# Patient Record
Sex: FEMALE | Race: WHITE | Employment: OTHER | URBAN - METROPOLITAN AREA
[De-identification: names, ages, dates, MRNs, and addresses within clinical notes are randomized per-mention and may not be internally consistent; named-entity substitution may affect disease eponyms.]

---

## 2017-01-24 ENCOUNTER — OFFICE VISIT (OUTPATIENT)
Dept: FAMILY MEDICINE CLINIC | Age: 77
End: 2017-01-24

## 2017-01-24 DIAGNOSIS — E11.9 TYPE 2 DIABETES MELLITUS WITHOUT COMPLICATION, WITHOUT LONG-TERM CURRENT USE OF INSULIN (HCC): ICD-10-CM

## 2017-01-24 DIAGNOSIS — E78.00 PURE HYPERCHOLESTEROLEMIA: ICD-10-CM

## 2017-01-24 DIAGNOSIS — I10 ESSENTIAL HYPERTENSION: ICD-10-CM

## 2017-01-24 DIAGNOSIS — R06.02 SOB (SHORTNESS OF BREATH) ON EXERTION: Primary | ICD-10-CM

## 2017-01-24 LAB — HBA1C MFR BLD HPLC: 6 %

## 2017-01-24 NOTE — PROGRESS NOTES
Jennifer Lantigua is a 78 y.o. female  Chief Complaint   Patient presents with    Diabetes     follow up     1. Have you been to the ER, urgent care clinic since your last visit? Hospitalized since your last visit? No    2. Have you seen or consulted any other health care providers outside of the [address] [place] [address] since your last visit? Include any pap smears or colon screening.  No

## 2017-01-24 NOTE — PATIENT INSTRUCTIONS

## 2017-01-24 NOTE — MR AVS SNAPSHOT
Visit Information Date & Time Provider Department Dept. Phone Encounter #  
 1/24/2017 10:15 AM Pj Torres, 3 [facility] [phone] [id number] Upcoming Health Maintenance Date Due Pneumococcal 65+ Low/Medium Risk (2 of 2 - PCV13) 9/23/2015 COLONOSCOPY 6/14/2016 INFLUENZA AGE 9 TO ADULT 8/1/2016 EYE EXAM RETINAL OR DILATED Q1 8/22/2016 HEMOGLOBIN A1C Q6M 2/22/2017 FOOT EXAM Q1 7/20/2017 MEDICARE YEARLY EXAM 7/21/2017 GLAUCOMA SCREENING Q2Y 8/22/2017 MICROALBUMIN Q1 8/22/2017 LIPID PANEL Q1 8/22/2017 DTaP/Tdap/Td series (2 - Td) 6/15/2025 Allergies as of 1/24/2017  Review Complete On: 1/24/2017 By: Pj Torres MD  
 No Known Allergies Current Immunizations  Reviewed on 1/24/2017 Name Date Influenza High Dose Vaccine PF 9/14/2015 11:59 AM  
 Influenza Vaccine 9/23/2014, 11/13/2013 Pneumococcal Polysaccharide (PPSV-23) 9/23/2014  9:42 AM  
 Tdap 6/15/2015 Zoster Vaccine, Live 9/23/2012 Reviewed by Pj Torres MD on 1/24/2017 at 11:28 AM  
You Were Diagnosed With   
  
 Codes Comments SOB (shortness of breath) on exertion    -  Primary ICD-10-CM: R06.02 
ICD-9-CM: 786.05 Type 2 diabetes mellitus without complication, without long-term current use of insulin (HCC)     ICD-10-CM: E11.9 ICD-9-CM: 250.00 Essential hypertension     ICD-10-CM: I10 
ICD-9-CM: 401.9 Pure hypercholesterolemia     ICD-10-CM: E78.00 ICD-9-CM: 272.0 Vitals BP Pulse Temp Height(growth percentile) Weight(growth percentile) SpO2  
 110/88 (BP 1 Location: Left arm, BP Patient Position: Sitting) 84 97.5 °F (36.4 °C) (Oral) 5' 5.5\" (1.664 m) 240 lb (108.9 kg) 94% BMI OB Status Smoking Status 39.33 kg/m2 Postmenopausal Never Smoker Vitals History BMI and BSA Data Body Mass Index Body Surface Area  
 39.33 kg/m 2 2.24 m 2 [phone] Your Updated Medication List  
  
   
This list is accurate as of: 1/24/17 11:40 AM.  Always use your most recent med list.  
  
  
  
  
 lisinopril-hydroCHLOROthiazide 10-12.5 mg per tablet Commonly known as:  Lucetta Cleve Take 1 Tab by mouth daily. metFORMIN  mg tablet Commonly known as:  GLUCOPHAGE XR Take 1 Tab by mouth daily (with dinner). simvastatin 5 mg tablet Commonly known as:  ZOCOR Take 1 Tab by mouth nightly. tolterodine ER 4 mg ER capsule Commonly known as:  Glenna Tiffany Take 1 Cap by mouth daily. TYLENOL 325 mg tablet Generic drug:  acetaminophen Take  by mouth every four (4) hours as needed for Pain. VITAMIN D2 50,000 unit capsule Generic drug:  ergocalciferol TAKE 1 CAPSULE EVERY 7 DAYS We Performed the Following AMB POC EKG ROUTINE W/ 12 LEADS, INTER & REP [19682 CPT(R)] AMB POC HEMOGLOBIN A1C [54045 CPT(R)] To-Do List   
 01/24/2017 ECHO:  2D ECHO COMPLETE ADULT (TTE) W OR WO CONTR   
  
 01/24/2017 Imaging:  NM CARDIAC SPECT W STRS/REST MULT   
  
 01/24/2017 ECG:  NUCLEAR STRESS TEST   
  
 01/24/2017 Imaging:  XR CHEST PA LAT Patient Instructions Learning About Diabetes Food Guidelines Your Care Instructions Meal planning is important to manage diabetes. It helps keep your blood sugar at a target level (which you set with your doctor). You don't have to eat special foods. You can eat what your family eats, including sweets once in a while. But you do have to pay attention to how often you eat and how much you eat of certain foods. You may want to work with a dietitian or a certified diabetes educator (CDE) to help you plan meals and snacks. A dietitian or CDE can also help you lose weight if that is one of your goals. What should you know about eating carbs? Managing the amount of carbohydrate (carbs) you eat is an important part of healthy meals when you have diabetes. Carbohydrate is found in many foods. · Learn which foods have carbs. And learn the amounts of carbs in different foods. ¨ Bread, cereal, pasta, and rice have about 15 grams of carbs in a serving. A serving is 1 slice of bread (1 ounce), ½ cup of cooked cereal, or 1/3 cup of cooked pasta or rice. ¨ Fruits have 15 grams of carbs in a serving. A serving is 1 small fresh fruit, such as an apple or orange; ½ of a banana; ½ cup of cooked or canned fruit; ½ cup of fruit juice; 1 cup of melon or raspberries; or 2 tablespoons of dried fruit. ¨ Milk and no-sugar-added yogurt have 15 grams of carbs in a serving. A serving is 1 cup of milk or 2/3 cup of no-sugar-added yogurt. ¨ Starchy vegetables have 15 grams of carbs in a serving. A serving is ½ cup of mashed potatoes or sweet potato; 1 cup winter squash; ½ of a small baked potato; ½ cup of cooked beans; or ½ cup cooked corn or green peas. · Learn how much carbs to eat each day and at each meal. A dietitian or CDE can teach you how to keep track of the amount of carbs you eat. This is called carbohydrate counting. · If you are not sure how to count carbohydrate grams, use the Plate Method to plan meals. It is a good, quick way to make sure that you have a balanced meal. It also helps you spread carbs throughout the day. ¨ Divide your plate by types of foods. Put non-starchy vegetables on half the plate, meat or other protein food on one-quarter of the plate, and a grain or starchy vegetable in the final quarter of the plate. To this you can add a small piece of fruit and 1 cup of milk or yogurt, depending on how many carbs you are supposed to eat at a meal. 
· Try to eat about the same amount of carbs at each meal. Do not \"save up\" your daily allowance of carbs to eat at one meal. 
· Proteins have very little or no carbs per serving.  Examples of proteins are beef, chicken, turkey, fish, eggs, tofu, cheese, cottage cheese, and peanut butter. A serving size of meat is 3 ounces, which is about the size of a deck of cards. Examples of meat substitute serving sizes (equal to 1 ounce of meat) are 1/4 cup of cottage cheese, 1 egg, 1 tablespoon of peanut butter, and ½ cup of tofu. How can you eat out and still eat healthy? · Learn to estimate the serving sizes of foods that have carbohydrate. If you measure food at home, it will be easier to estimate the amount in a serving of restaurant food. · If the meal you order has too much carbohydrate (such as potatoes, corn, or baked beans), ask to have a low-carbohydrate food instead. Ask for a salad or green vegetables. · If you use insulin, check your blood sugar before and after eating out to help you plan how much to eat in the future. · If you eat more carbohydrate at a meal than you had planned, take a walk or do other exercise. This will help lower your blood sugar. What else should you know? · Limit saturated fat, such as the fat from meat and dairy products. This is a healthy choice because people who have diabetes are at higher risk of heart disease. So choose lean cuts of meat and nonfat or low-fat dairy products. Use olive or canola oil instead of butter or shortening when cooking. · Don't skip meals. Your blood sugar may drop too low if you skip meals and take insulin or certain medicines for diabetes. · Check with your doctor before you drink alcohol. Alcohol can cause your blood sugar to drop too low. Alcohol can also cause a bad reaction if you take certain diabetes medicines. Follow-up care is a key part of your treatment and safety. Be sure to make and go to all appointments, and call your doctor if you are having problems. It's also a good idea to know your test results and keep a list of the medicines you take. Where can you learn more? Go to http://tamie-alberto.info/. Enter H929 in the search box to learn more about \"Learning About Diabetes Food Guidelines. \" Current as of: May 23, 2016 Content Version: 11.1 © 0555-1307 [employer]. Care instructions adapted under license by [employer] (which disclaims liability or warranty for this information). If you have questions about a medical condition or this instruction, always ask your healthcare professional. [address] any warranty or liability for your use of this information. Introducing [address]! Dear Sakshi Nunez: Thank you for requesting a V[employer] account. Our records indicate that you already have an active Vend account. You can access your account anytime at [email or web address]. [email or web address] Did you know that you can access your hospital and ER discharge instructions at any time in [employer]end? You can also review all of your test results from your hospital stay or ER visit. Additional Information If you have questions, please visit the Frequently Asked Questions section of the [employer]end website at [email or web address] Remember, Vend is NOT to be used for urgent needs. For medical emergencies, dial 911. Now available from your iPhone and Android! Please provide this summary of care documentation to your next provider. Your primary care clinician is listed as Amber Thakur. If you have any questions after today's visit, please call [phone].

## 2017-01-24 NOTE — PROGRESS NOTES
Assessment/Plan:    Jovanny Yusuf was seen today for diabetes. Diagnoses and all orders for this visit:    SOB (shortness of breath) on exertion  -     AMB POC EKG ROUTINE W/ 12 LEADS, INTER & REP  -     NUCLEAR STRESS TEST; Future  -     NM CARDIAC SPECT W STRS / REST MULT; Future  -     2D ECHO COMPLETE ADULT (TTE) W OR WO CONTR; Future  -     XR CHEST PA LAT; Future  -     PULMONARY FUNCTION TEST; Future  -     REFERRAL TO PULMONARY DISEASE    Type 2 diabetes mellitus without complication, without long-term current use of insulin (HCC)  -     AMB POC HEMOGLOBIN A1C    Essential hypertension    Pure hypercholesterolemia      Will await results of above. If cardiac w/u clear, will refer for PFT. Will f/u 3 months. Just A1c. The plan was discussed with the patient. The patient verbalized understanding and is in agreement with the plan. All medication potential side effects were discussed with the patient.    -------------------------------------------------------------------------------------------------------------------        Araceli Deysi is a 78 y.o. female and presents with Diabetes (follow up)         Subjective:  Pt here for f/u. Has been having an on going issue with SOB with exertion x months. This is the first she has mentioned this to us. Was SOB coming down the hallway to the exam room. Has hx of HLD, DM and HTN with are risk factors for CAD. She denies any wheezing, or pillow orthopnea. Finds any distance with walking to be an issue. HTN: well controlled. DM: A1c in office today is 6.0%. HLD: stable. ROS:  Constitutional: No recent weight change. No weakness/fatigue. No f/c. Skin: No rashes, change in nails/hair, itching   HENT: No HA, dizziness. No hearing loss/tinnitus. No nasal congestion/discharge. Eyes: No change in vision, double/blurred vision or eye pain/redness. Cardiovascular: No CP/palpitations. No SZYMANSKI/orthopnea/PND.    Respiratory: No cough/sputum, dyspnea, wheezing. Gastointestinal: No dysphagia, reflux. No n/v. No constipation/diarrhea. No melena/rectal bleeding. Genitourinary: No dysuria, urinary hesitancy, nocturia, hematuria. No incontinence. Musculoskeletal: No joint pain/stiffness. No muscle pain/tenderness. Endo: No heat/cold intolerance, no polyuria/polydypsia. Heme: No h/o anemia. No easy bleeding/bruising. Allergy/Immunology: No seasonal rhinitis. Denies frequent colds, sinus/ear infections. Neurological: No seizures/numbness/weakness. No paresthesias. Psychiatric:  No depression, anxiety. The problem list was updated as a part of today's visit. Patient Active Problem List   Diagnosis Code    HTN (hypertension) I10    Arthritis M19.90    Left femoral vein DVT ([facility] I82.412    Diabetes mellitus, type 2 ([facility] E11.9    Degenerative disc disease, lumbar M51.36    HLD (hyperlipidemia) E78.5       The PSH, FH were reviewed. SH:  Social History   Substance Use Topics    Smoking status: Never Smoker    Smokeless tobacco: Never Used    Alcohol use No       Medications/Allergies:  Current Outpatient Prescriptions on File Prior to Visit   Medication Sig Dispense Refill    metFORMIN ER (GLUCOPHAGE XR) 500 mg tablet Take 1 Tab by mouth daily (with dinner). 90 Tab 2    simvastatin (ZOCOR) 5 mg tablet Take 1 Tab by mouth nightly. 90 Tab 2    acetaminophen (TYLENOL) 325 mg tablet Take  by mouth every four (4) hours as needed for Pain. No current facility-administered medications on file prior to visit.          No Known Allergies      Health Maintenance:   Health Maintenance   Topic Date Due    Pneumococcal 65+ Low/Medium Risk (2 of 2 - PCV13) 09/23/2015    COLONOSCOPY  06/14/2016    INFLUENZA AGE 9 TO ADULT  08/01/2016    FOOT EXAM Q1  07/20/2017    MEDICARE YEARLY EXAM  07/21/2017    HEMOGLOBIN A1C Q6M  07/24/2017    MICROALBUMIN Q1  08/22/2017    LIPID PANEL Q1  08/22/2017    EYE EXAM RETINAL OR DILATED Q1  02/25/2018    GLAUCOMA SCREENING Q2Y  02/25/2019    DTaP/Tdap/Td series (2 - Td) 06/15/2025    OSTEOPOROSIS SCREENING (DEXA)  Completed    ZOSTER VACCINE AGE 60>  Completed       Objective:  Visit Vitals    /88 (BP 1 Location: Left arm, BP Patient Position: Sitting)    Pulse 84    Temp 97.5 °F (36.4 °C) (Oral)    Ht 5' 5.5\" (1.664 m)    Wt 240 lb (108.9 kg)    SpO2 98%    BMI 39.33 kg/m2          Nurses notes and VS reviewed. Physical Examination: General appearance - alert, well appearing, and in no distress  Chest - clear to auscultation, no wheezes, rales or rhonchi, symmetric air entry  Heart - normal rate, regular rhythm, normal S1, S2, no murmurs, rubs, clicks or gallops  Abdomen - soft, nontender, nondistended, no masses or organomegaly        Labwork and Ancillary Studies:    CBC w/Diff  Lab Results   Component Value Date/Time    WBC 6.1 08/22/2016 08:34 AM    HGB 14.7 08/22/2016 08:34 AM    PLATELET 231 35/99/0798 08:34 AM         Basic Metabolic Profile  Lab Results   Component Value Date/Time    Sodium 139 08/22/2016 08:34 AM    Potassium 4.2 08/22/2016 08:34 AM    Chloride 104 08/22/2016 08:34 AM    CO2 26 08/22/2016 08:34 AM    Anion gap 9 08/22/2016 08:34 AM    Glucose 122 08/22/2016 08:34 AM    BUN 23 08/22/2016 08:34 AM    Creatinine 1.22 08/22/2016 08:34 AM    BUN/Creatinine ratio 19 08/22/2016 08:34 AM    GFR est AA 52 08/22/2016 08:34 AM    GFR est non-AA 43 08/22/2016 08:34 AM    Calcium 8.8 08/22/2016 08:34 AM         LFT  Lab Results   Component Value Date/Time    ALT (SGPT) 22 08/22/2016 08:34 AM    AST (SGOT) 18 08/22/2016 08:34 AM    Alk.  phosphatase 70 08/22/2016 08:34 AM    Bilirubin, direct 0.2 08/22/2016 08:34 AM    Bilirubin, total 0.8 08/22/2016 08:34 AM         Cholesterol  Lab Results   Component Value Date/Time    Cholesterol, total 148 08/22/2016 08:34 AM    HDL Cholesterol 50 08/22/2016 08:34 AM    LDL, calculated 69.2 08/22/2016 08:34 AM Triglyceride 144 08/22/2016 08:34 AM    CHOL/HDL Ratio 3.0 08/22/2016 08:34 AM

## 2017-01-26 RX ORDER — LISINOPRIL AND HYDROCHLOROTHIAZIDE 10; 12.5 MG/1; MG/1
TABLET ORAL
Qty: 90 TAB | Refills: 1 | Status: SHIPPED | OUTPATIENT
Start: 2017-01-26 | End: 2017-03-23 | Stop reason: ALTCHOICE

## 2017-01-30 ENCOUNTER — TELEPHONE (OUTPATIENT)
Dept: FAMILY MEDICINE CLINIC | Age: 77
End: 2017-01-30

## 2017-02-22 DIAGNOSIS — R06.02 SOB (SHORTNESS OF BREATH) ON EXERTION: ICD-10-CM

## 2017-03-01 ENCOUNTER — TELEPHONE (OUTPATIENT)
Dept: FAMILY MEDICINE CLINIC | Age: 77
End: 2017-03-01

## 2017-03-01 NOTE — TELEPHONE ENCOUNTER
Staff called asking for a verbal order to add pulmonary simple stress test, 6 minute walk test. Pt exhibited SOB and cyanosis of nose and fingers during PFTs. Okayed verbally by Dr. Stephenie Mancera and order given to pulmonary staff Luisa.

## 2017-03-17 ENCOUNTER — OFFICE VISIT (OUTPATIENT)
Dept: FAMILY MEDICINE CLINIC | Age: 77
End: 2017-03-17

## 2017-03-17 VITALS
TEMPERATURE: 95.2 F | OXYGEN SATURATION: 95 % | SYSTOLIC BLOOD PRESSURE: 145 MMHG | WEIGHT: 241 LBS | HEART RATE: 81 BPM | BODY MASS INDEX: 38.73 KG/M2 | DIASTOLIC BLOOD PRESSURE: 80 MMHG | RESPIRATION RATE: 20 BRPM | HEIGHT: 66 IN

## 2017-03-17 DIAGNOSIS — I83.891 VARICOSE VEINS OF LEG WITH EDEMA, RIGHT: Primary | ICD-10-CM

## 2017-03-17 RX ORDER — MONTELUKAST SODIUM 10 MG/1
10 TABLET ORAL DAILY
Qty: 90 TAB | Refills: 3 | Status: SHIPPED | OUTPATIENT
Start: 2017-03-17 | End: 2017-03-17 | Stop reason: SDUPTHER

## 2017-03-17 RX ORDER — MONTELUKAST SODIUM 10 MG/1
10 TABLET ORAL DAILY
Qty: 30 TAB | Refills: 0 | Status: SHIPPED | OUTPATIENT
Start: 2017-03-17 | End: 2017-03-17 | Stop reason: CLARIF

## 2017-03-17 NOTE — PROGRESS NOTES
Frank Morel is a 78 y.o. female  Here today for right foot swelling and pain in leg. 1. Have you been to the ER, urgent care clinic or hospitalized since your last visit? NO.     2. Have you seen or consulted any other health care providers outside of the [address] [place] [address] since your last visit (Include any pap smears or colon screening)? NO      Do you have an Advanced Directive? NO    Would you like information on Advanced Directives?  NO    Learning Assessment 5/18/2016   PRIMARY LEARNER Patient   HIGHEST LEVEL OF EDUCATION - PRIMARY LEARNER  2 YEARS OF COLLEGE   BARRIERS PRIMARY LEARNER NONE   CO-LEARNER CAREGIVER No   PRIMARY LANGUAGE ENGLISH   LEARNER PREFERENCE PRIMARY LISTENING   ANSWERED BY patient    RELATIONSHIP SELF

## 2017-03-17 NOTE — PATIENT INSTRUCTIONS
Present to the local emergency department immediately after this clinic visit to be evaluated for possible clot in the right leg.

## 2017-03-17 NOTE — PROGRESS NOTES
HISTORY OF PRESENT ILLNESS  Michelle Thomason is a 78 y.o. female. HPI  Patient presents with a warm, edematous right lower extremity from the calf extending into the right foot with mild erythema noted over the anterior tib/fib area of this extremity. She reports a history of these same symptoms to the left lower extremity ultimately diagnosed as a DVT. She has been undergoing evaluation for shortness of breath and is still short of breath today. Denies wheezing, fever, PND, orthopnea. Symptoms graudally developed over the past week and associated with mild discomfort. With elevation of the right lower extremity no change in the light blue color to the foot and toes. Marked varicosities noted bilaterally. Symptom onset gradual, timing constant, pain mild. Review of Systems   Constitutional: Negative. Negative for diaphoresis, fever and malaise/fatigue. HENT: Negative. Eyes: Negative. Negative for blurred vision and photophobia. Respiratory: Negative. Negative for cough, hemoptysis, sputum production, shortness of breath and wheezing. Cardiovascular: Positive for leg swelling. Negative for chest pain, palpitations, orthopnea, claudication and PND. Gastrointestinal: Negative. Negative for abdominal pain, nausea and vomiting. Musculoskeletal: Negative. Negative for back pain, joint pain, myalgias and neck pain. Skin: Negative. Negative for itching and rash. Neurological: Negative. Negative for dizziness, sensory change, focal weakness and weakness.         Past Medical History:   Diagnosis Date    Arthritis     Degenerative disc disease, lumbar 7/3/2014    Diabetes mellitus, type 2 ([address] 6/26/2014    HLD (hyperlipidemia) 3/16/2015    Hypertension     IFG (impaired fasting glucose) 6/26/2014    Left femoral vein DVT ([facility] 2/11/2014       Current Outpatient Prescriptions:     VITAMIN D2 50,000 unit capsule, TAKE 1 CAPSULE EVERY 7 DAYS, Disp: 12 Cap, Rfl: 2   lisinopril-hydroCHLOROthiazide (PRINZIDE, ZESTORETIC) 10-12.5 mg per tablet, TAKE 1 TABLET DAILY, Disp: 90 Tab, Rfl: 1    metFORMIN ER (GLUCOPHAGE XR) 500 mg tablet, Take 1 Tab by mouth daily (with dinner). , Disp: 90 Tab, Rfl: 2    simvastatin (ZOCOR) 5 mg tablet, Take 1 Tab by mouth nightly., Disp: 90 Tab, Rfl: 2    tolterodine ER (DETROL LA) 4 mg ER capsule, Take 1 Cap by mouth daily. , Disp: 90 Cap, Rfl: 2    acetaminophen (TYLENOL) 325 mg tablet, Take  by mouth every four (4) hours as needed for Pain., Disp: , Rfl:       Physical Exam   Constitutional: She is oriented to person, place, and time. She appears well-developed and well-nourished. No distress. HENT:   Head: Normocephalic and atraumatic. Eyes: EOM are normal. Pupils are equal, round, and reactive to light. No scleral icterus. Neck: Neck supple. No JVD present. No tracheal deviation present. Cardiovascular: Normal rate, regular rhythm, normal heart sounds and intact distal pulses. Exam reveals no gallop and no friction rub. No murmur heard. Pulmonary/Chest: Effort normal and breath sounds normal. No stridor. No respiratory distress. She has no wheezes. She has no rales. She exhibits no tenderness. Breath sounds are clear but slightly decreased. Abdominal: Soft. Bowel sounds are normal. She exhibits no distension. There is no tenderness. There is no rebound and no guarding. Musculoskeletal: She exhibits edema. She exhibits no tenderness or deformity. The soft tissues of the right lower extremity are tight in appearance and in palpation. No cords noted to right lower extremity. Right foot and lower tib/fib is warmer compared to the left. Right foot nails slightly bluish in color with no change in elevation of this foot. Gait: slightly antalgic favoring the RLE. Neurological: She is alert and oriented to person, place, and time. No cranial nerve deficit. She exhibits normal muscle tone.  Coordination normal.   Skin: She is not diaphoretic. Psychiatric: She has a normal mood and affect. Her behavior is normal. Thought content normal.   Nursing note and vitals reviewed. ASSESSMENT and PLAN  1. Acute right lower extremity edema, warm with mild streaking  2. Shortness of breath   -Referral to hospital with recommendation of daughter presenting patient to ED via private vehicle as she is stable. Patient is in agreement with the treatment plan. Return to the clinic if needed. All questions answered and discharge instructions reviewed with the patient.

## 2017-03-17 NOTE — MR AVS SNAPSHOT
Visit Information Date & Time Provider Department Dept. Phone Encounter #  
 3/17/2017  2:15 PM Hanane Pimentel, 3 [facility] [phone] [id number] Your Appointments 4/26/2017 10:15 AM  
Follow Up with Lexus Hopkins MD  
3 [facility]) Appt Note: 1 month f/u; r/s  
 [address] [facility] [address] [facility] [address] [phone]  
  
   
 [address] Upcoming Health Maintenance Date Due Pneumococcal 65+ Low/Medium Risk (2 of 2 - PCV13) 9/23/2015 COLONOSCOPY 6/14/2016 INFLUENZA AGE 9 TO ADULT 8/1/2016 FOOT EXAM Q1 7/20/2017 MEDICARE YEARLY EXAM 7/21/2017 HEMOGLOBIN A1C Q6M 7/24/2017 MICROALBUMIN Q1 8/22/2017 LIPID PANEL Q1 8/22/2017 EYE EXAM RETINAL OR DILATED Q1 2/25/2018 GLAUCOMA SCREENING Q2Y 2/25/2019 DTaP/Tdap/Td series (2 - Td) 6/15/2025 Allergies as of 3/17/2017  Review Complete On: 3/17/2017 By: Hanane Pimentel NP No Known Allergies Current Immunizations  Reviewed on 1/24/2017 Name Date Influenza High Dose Vaccine PF 9/14/2015 11:59 AM  
 Influenza Vaccine 9/23/2014, 11/13/2013 Pneumococcal Polysaccharide (PPSV-23) 9/23/2014  9:42 AM  
 Tdap 6/15/2015 Zoster Vaccine, Live 9/23/2012 Not reviewed this visit You Were Diagnosed With   
  
 Codes Comments Varicose veins of leg with edema, right    -  Primary ICD-10-CM: Y78.369 ICD-9-CM: 454.8 Vitals BP Pulse Temp Resp Height(growth percentile) Weight(growth percentile) 145/80 (BP 1 Location: Left arm, BP Patient Position: Sitting) 81 95.2 °F (35.1 °C) (Oral) 20 5' 5.5\" (1.664 m) 241 lb (109.3 kg) SpO2 BMI OB Status Smoking Status 95% 39.49 kg/m2 Postmenopausal Never Smoker BMI and BSA Data Body Mass Index Body Surface Area  
 39.49 kg/m 2 2.25 m 2 [phone] Your Updated Medication List  
  
   
This list is accurate as of: 3/17/17  2:30 PM.  Always use your most recent med list.  
  
  
  
  
 lisinopril-hydroCHLOROthiazide 10-12.5 mg per tablet Commonly known as:  PRINZIDE, ZESTORETIC  
TAKE 1 TABLET DAILY  
  
 metFORMIN  mg tablet Commonly known as:  GLUCOPHAGE XR Take 1 Tab by mouth daily (with dinner). simvastatin 5 mg tablet Commonly known as:  ZOCOR Take 1 Tab by mouth nightly. tolterodine ER 4 mg ER capsule Commonly known as:  Mikala Poet Take 1 Cap by mouth daily. TYLENOL 325 mg tablet Generic drug:  acetaminophen Take  by mouth every four (4) hours as needed for Pain. VITAMIN D2 50,000 unit capsule Generic drug:  ergocalciferol TAKE 1 CAPSULE EVERY 7 DAYS Patient Instructions Present to the local emergency department immediately after this clinic visit to be evaluated for possible clot in the right leg. Introducing [facility]! Dear Tera Troncoso: Thank you for requesting a Pronota account. Our records indicate that you already have an active Pronota account. You can access your account anytime at [email or web address] Did you know that you can access your hospital and ER discharge instructions at any time in Pronota? You can also review all of your test results from your hospital stay or ER visit. Additional Information If you have questions, please visit the Frequently Asked Questions section of the Pronota website at [email or web address] Remember, Pronota is NOT to be used for urgent needs. For medical emergencies, dial 911. Now available from your iPhone and Android! Please provide this summary of care documentation to your next provider. Your primary care clinician is listed as Amber 51.  If you have any questions after today's visit, please call [phone].

## 2017-03-19 RX ORDER — TOLTERODINE TARTRATE 4 MG/1
CAPSULE, EXTENDED RELEASE ORAL
Qty: 90 CAP | Refills: 1 | Status: SHIPPED | OUTPATIENT
Start: 2017-03-19 | End: 2018-08-13 | Stop reason: SDUPTHER

## 2017-03-22 ENCOUNTER — PATIENT OUTREACH (OUTPATIENT)
Dept: FAMILY MEDICINE CLINIC | Age: 77
End: 2017-03-22

## 2017-03-22 ENCOUNTER — TELEPHONE (OUTPATIENT)
Dept: FAMILY MEDICINE CLINIC | Age: 77
End: 2017-03-22

## 2017-03-22 VITALS
HEART RATE: 84 BPM | BODY MASS INDEX: 38.57 KG/M2 | WEIGHT: 240 LBS | OXYGEN SATURATION: 98 % | DIASTOLIC BLOOD PRESSURE: 88 MMHG | SYSTOLIC BLOOD PRESSURE: 110 MMHG | HEIGHT: 66 IN | TEMPERATURE: 97.5 F

## 2017-03-22 NOTE — TELEPHONE ENCOUNTER
Called pt to see how she was doing. Discussed with her what had happened during her hospital stay. She feels much better now and is on Eliquis. She will now need to stay on this life long since she has prior hx of DVT. She verbalized understanding, has a f/u next week with me. She thanked me for calling her.

## 2017-03-22 NOTE — Clinical Note
JIMMY is sched. for 3/27/2017 [id number] ( 7 day) DVT and PE discharged 3/22/2017 from [facility]. Sending you a medication request for her Eliquis now.  Thanks

## 2017-03-23 ENCOUNTER — PATIENT OUTREACH (OUTPATIENT)
Dept: FAMILY MEDICINE CLINIC | Age: 77
End: 2017-03-23

## 2017-03-23 DIAGNOSIS — I26.92 ACUTE SADDLE PULMONARY EMBOLISM WITHOUT ACUTE COR PULMONALE (HCC): ICD-10-CM

## 2017-03-23 DIAGNOSIS — I82.413 ACUTE DEEP VEIN THROMBOSIS (DVT) OF FEMORAL VEIN OF BOTH LOWER EXTREMITIES (HCC): Primary | ICD-10-CM

## 2017-03-23 PROBLEM — I82.4Z1 ACUTE EMBOLISM AND THROMBOSIS OF DEEP VEIN OF RIGHT DISTAL LOWER EXTREMITY (HCC): Status: ACTIVE | Noted: 2017-03-18

## 2017-03-23 PROBLEM — R06.02 SHORTNESS OF BREATH: Status: ACTIVE | Noted: 2017-03-23

## 2017-03-23 PROBLEM — I26.99 PULMONARY EMBOLISM (HCC): Status: ACTIVE | Noted: 2017-03-18

## 2017-03-23 PROBLEM — R09.02 HYPOXIA: Status: ACTIVE | Noted: 2017-03-17

## 2017-03-23 RX ORDER — OXYCODONE HYDROCHLORIDE 5 MG/1
5-10 TABLET ORAL
COMMUNITY
Start: 2016-03-02 | End: 2017-04-26

## 2017-03-23 RX ORDER — DOCUSATE SODIUM 100 MG/1
100 CAPSULE, LIQUID FILLED ORAL DAILY
COMMUNITY
Start: 2016-03-02 | End: 2017-04-26

## 2017-03-23 RX ORDER — LISINOPRIL 5 MG/1
5 TABLET ORAL DAILY
COMMUNITY
Start: 2017-03-22 | End: 2017-03-27 | Stop reason: SDUPTHER

## 2017-03-23 NOTE — PROGRESS NOTES
.  Transitional Care Nurse Navigator Note:  Hospital Follow Up for [facility] Admission from 3/17 - 22/2017 for SOB, DVT and PE. RRAT score: 26 High  Medical History:     Past Medical History:   Diagnosis Date    Arthritis     Degenerative disc disease, lumbar 7/3/2014    Diabetes mellitus, type 2 ([facility] [address] 6/26/2014    HLD (hyperlipidemia) 3/16/2015    Hypertension     IFG (impaired fasting glucose) 6/26/2014    Left femoral vein DVT ([facility] [address] 2/11/2014       Patient presenting symptoms SOB    Diagnosed with DVT and PE. Admitted to Hospitalist Service with consults from Pulmonary and vascular. Consults recommended Eliquis on going x 120 days and then re-evaluate. Active Problems:  Hypoxia  Pulmonary embolism (HCC)  Acute deep vein thrombosis (DVT) of distal vein of right lower extremity (HCC)  Acute saddle pulmonary embolism without acute cor pulmonale (HCC)     Course of current Hospitalization (referenced by Jonathan Weeks MD note):   Hospital Course: Ms. Irma Dennison has been feeling short winded for months.  However, the past few days has seen an significant increase in her dyspnea and effort tolerance.  \"What used to take me a day to complete now takes a week. \"  She says she has seen her PCM for this, and has been through radiography and cardiac studies that she reports were unrevealing.  She says she has a PmHx of 1 left leg DVT years ago, the cause of which was not known.  She has a Fam Hx positive for DVT/PE (her mother).  She says she has not had any periods of significant immobility, illness and has no known screenings positive for malignancy. Patient was admitted to the medical floor and was started on subcutaneous heparin. Patient's echo did not reveal any right ventricular strain and peter H peptide is appears to be unremarkable. She was subsequent started on novel oral anticoagulants and patient was subsequently discharged home.  She had walking pulse ox done before her discharge and she did not qualify for oxygen at home. He was resumed back again on her usual medications. Medication Reconciliation completed: YES    Prescription Medication:  Eliquis 10 mg twice a day for 5 more days  Eliquis 5 mg twice a day for 120 days after completion of the 10 mg dosing. Support System consists of: Daughter Marcellus Gross     This represents Transitions of Care because NN spoke with patient and/or caregiver within the same  business day of discharge. Pt's TCM follow up appt is scheduled with Dr. Christen Marcelo on Monday 03/27/2017 @ 2:00pm  which is within 5 days of discharge. Patient Called in to office on 3/22/2017 for a hospital follow up and to get Dr Christen Marcelo to send her Eliquis 5 mg twice a day to [facility] before she comes in because she will be out of the medication soon. Care Mgmt intake done . Will call patient tomorrow to let her know that Dr Christen Marcelo has e-scribed her medication in.

## 2017-03-23 NOTE — PROGRESS NOTES
Called patient to inform her of new Eliquis prescription for 5 mg twice a day was sent to Dr Bee Chatman for refill to [facility]. Patient stated \" She , Dr Bee Chatman called her on yesterday to see how she was\" Will see us on Monday 3/27/2017 at her f/u [facility] appointment. Denies SOB at this time.

## 2017-03-27 ENCOUNTER — OFFICE VISIT (OUTPATIENT)
Dept: FAMILY MEDICINE CLINIC | Age: 77
End: 2017-03-27

## 2017-03-27 VITALS
SYSTOLIC BLOOD PRESSURE: 142 MMHG | DIASTOLIC BLOOD PRESSURE: 84 MMHG | WEIGHT: 242 LBS | OXYGEN SATURATION: 95 % | HEART RATE: 82 BPM | RESPIRATION RATE: 22 BRPM | BODY MASS INDEX: 38.89 KG/M2 | HEIGHT: 66 IN | TEMPERATURE: 98.3 F

## 2017-03-27 DIAGNOSIS — I26.92 ACUTE SADDLE PULMONARY EMBOLISM WITHOUT ACUTE COR PULMONALE (HCC): Primary | ICD-10-CM

## 2017-03-27 DIAGNOSIS — I10 ESSENTIAL HYPERTENSION: ICD-10-CM

## 2017-03-27 DIAGNOSIS — I82.4Z1 ACUTE EMBOLISM AND THROMBOSIS OF DEEP VEIN OF RIGHT DISTAL LOWER EXTREMITY (HCC): ICD-10-CM

## 2017-03-27 RX ORDER — LISINOPRIL 5 MG/1
5 TABLET ORAL DAILY
Qty: 90 TAB | Refills: 2 | Status: SHIPPED | OUTPATIENT
Start: 2017-03-27 | End: 2017-08-23 | Stop reason: SDUPTHER

## 2017-03-27 NOTE — PATIENT INSTRUCTIONS
Deep Vein Thrombosis: Care Instructions  Your Care Instructions    A deep vein thrombosis (DVT) is a blood clot in certain veins of the legs, pelvis, or arms. Blood clots in these veins need to be treated because they can get bigger, break loose, and travel through the bloodstream to the lungs. A blood clot in a lung can be life-threatening. The doctor may have given you a blood thinner (anticoagulant). A blood thinner can stop the blood clot from growing larger and prevent new clots from forming. You will need to take a blood thinner for 3 to 6 months or longer. The doctor has checked you carefully, but problems can develop later. If you notice any problems or new symptoms, get medical treatment right away. Follow-up care is a key part of your treatment and safety. Be sure to make and go to all appointments, and call your doctor if you are having problems. It's also a good idea to know your test results and keep a list of the medicines you take. How can you care for yourself at home? · Take your medicines exactly as prescribed. Call your doctor if you think you are having a problem with your medicine. · If you are taking a blood thinner, be sure you get instructions about how to take your medicine safely. Blood thinners can cause serious bleeding problems. · Wear compression stockings if your doctor recommends them. These stockings are tighter at the feet than on the legs. They may reduce pain and swelling in your legs. You can get them with a prescription at a medical supply store or at some drugstores. · When you sit, use a pillow to raise the arm or leg that has the blood clot. Try to keep it above the level of your heart. When should you call for help? Call 911 anytime you think you may need emergency care. For example, call if:  · You passed out (lost consciousness). · You have symptoms of a blood clot in your lung (called a pulmonary embolism). These include:  ¨ Sudden chest pain.   ¨ Trouble breathing. ¨ Coughing up blood. Call your doctor now or seek immediate medical care if:  · You have new or worse trouble breathing. · You are dizzy or lightheaded, or you feel like you may faint. · You have symptoms of a blood clot in your arm or leg. These may include:  ¨ Pain in the arm, calf, back of the knee, thigh, or groin. ¨ Redness and swelling in the arm, leg, or groin. Watch closely for changes in your health, and be sure to contact your doctor if:  · You do not get better as expected. Where can you learn more? Go to http://tamie-alberto.info/. Enter S974 in the search box to learn more about \"Deep Vein Thrombosis: Care Instructions. \"  Current as of: June 4, 2016  Content Version: 11.1  © 5318-3031 [employer]. Care instructions adapted under license by [employer] (which disclaims liability or warranty for this information). If you have questions about a medical condition or this instruction, always ask your healthcare professional. [place] [address] any warranty or liability for your use of this information.

## 2017-03-27 NOTE — PROGRESS NOTES
Nadira Valenzuela is a 78 y.o. female here today for transition of care visit. 1. Have you been to the ER, urgent care clinic since your last visit? Hospitalized since your last visit? Yes Reason for visit: 3/22/17 SPAH, shortness of breath    2. Have you seen or consulted any other health care providers outside of the [address] [place] [address] since your last visit? Include any pap smears or colon screening.  No

## 2017-03-27 NOTE — PROGRESS NOTES
Assessment/Plan:    *Diagnoses and all orders for this visit:    Acute saddle pulmonary embolism without acute cor pulmonale (HCC)    Acute embolism and thrombosis of deep vein of right distal lower extremity (HCC)    Essential hypertension    Other orders  -     lisinopril (PRINIVIL, ZESTRIL) 5 mg tablet; Take 1 Tab by mouth daily. Routine f/u in 1 month. The plan was discussed with the patient. The patient verbalized understanding and is in agreement with the plan. All medication potential side effects were discussed with the patient.    -------------------------------------------------------------------------------------------------------------------        Halley Carlson is a 78 y.o. female and presents with No chief complaint on file. Subjective:  Pt here for a JIMMY visit. Pt outreach on 3/23/17 by DEMARIO Sexton. Pt was admitted from 3/17/17 to 3/22/17 for extensive RT LE DVT with PE as well. Was quite severe, on Eliquis 5 mg BID at present. 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 She will now stay on anticoagulation lifetime. ROS:  Constitutional: No recent weight change. No weakness/fatigue. No f/c. Skin: No rashes, change in nails/hair, itching   HENT: No HA, dizziness. No hearing loss/tinnitus. No nasal congestion/discharge. Eyes: No change in vision, double/blurred vision or eye pain/redness. Cardiovascular: No CP/palpitations. No SZYMANSKI/orthopnea/PND. Respiratory: No cough/sputum, dyspnea, wheezing. Gastointestinal: No dysphagia, reflux. No n/v. No constipation/diarrhea. No melena/rectal bleeding. Genitourinary: No dysuria, urinary hesitancy, nocturia, hematuria. No incontinence. Musculoskeletal: No joint pain/stiffness. No muscle pain/tenderness. Endo: No heat/cold intolerance, no polyuria/polydypsia. Heme: No h/o anemia. No easy bleeding/bruising. Allergy/Immunology: No seasonal rhinitis. Denies frequent colds, sinus/ear infections. Neurological: No seizures/numbness/weakness. No paresthesias. Psychiatric:  No depression, anxiety. The problem list was updated as a part of today's visit. Patient Active Problem List   Diagnosis Code    HTN (hypertension) I10    Arthritis M19.90    Left femoral vein DVT ([facility] [address] I82.412    Diabetes mellitus, type 2 ([facility] [address] E11.9    Degenerative disc disease, lumbar M51.36    HLD (hyperlipidemia) E78.5    Acute embolism and thrombosis of deep vein of right distal lower extremity ([address] I82.4Z1    Saddle embolus of pulmonary artery without acute cor pulmonale (HCC) I26.92    Aftercare following joint replacement surgery Z47.1    Knee pain M25.569    Hypoxia R09.02    Primary localized osteoarthrosis of pelvic region M16.10    Osteoarthritis of knee M17.9    Pulmonary embolism (HCC) I26.99    Shortness of breath R06.02    History of total knee replacement Z96.659       The PSH, FH were reviewed. SH:  Social History   Substance Use Topics    Smoking status: Never Smoker    Smokeless tobacco: Never Used    Alcohol use No       Medications/Allergies:  Current Outpatient Prescriptions on File Prior to Visit   Medication Sig Dispense Refill    apixaban (ELIQUIS) 5 mg tablet Take 10 mg by mouth two (2) times a day. For 5 days      docusate sodium (COLACE) 100 mg capsule Take 100 mg by mouth daily.  apixaban (ELIQUIS) 5 mg tablet Take 1 Tab by mouth two (2) times a day. for DVT, Tab 2    DETROL LA 4 mg ER capsule TAKE 1 CAPSULE DAILY 90 Cap 1    VITAMIN D2 50,000 unit capsule TAKE 1 CAPSULE EVERY 7 DAYS 12 Cap 2    metFORMIN ER (GLUCOPHAGE XR) 500 mg tablet Take 1 Tab by mouth daily (with dinner). 90 Tab 2    simvastatin (ZOCOR) 5 mg tablet Take 1 Tab by mouth nightly. 90 Tab 2    acetaminophen (TYLENOL) 325 mg tablet Take  by mouth every four (4) hours as needed for Pain.       oxyCODONE IR (ROXICODONE) 5 mg immediate release tablet Take 5-10 mg by mouth every four (4) hours as needed for Pain. No current facility-administered medications on file prior to visit. No Known Allergies      Health Maintenance:   Health Maintenance   Topic Date Due    Pneumococcal 65+ Low/Medium Risk (2 of 2 - PCV13) 01/01/2016    COLONOSCOPY  06/14/2016    FOOT EXAM Q1  07/20/2017    MEDICARE YEARLY EXAM  07/21/2017    HEMOGLOBIN A1C Q6M  07/24/2017    MICROALBUMIN Q1  08/22/2017    LIPID PANEL Q1  08/22/2017    EYE EXAM RETINAL OR DILATED Q1  02/25/2018    GLAUCOMA SCREENING Q2Y  02/25/2019    DTaP/Tdap/Td series (2 - Td) 06/15/2025    OSTEOPOROSIS SCREENING (DEXA)  Completed    ZOSTER VACCINE AGE 60>  Completed    INFLUENZA AGE 9 TO ADULT  Completed       Objective:  Visit Vitals    /84    Pulse 82    Temp 98.3 °F (36.8 °C)    Resp 22    Ht 5' 5.5\" (1.664 m)    Wt 242 lb (109.8 kg)    SpO2 95%    BMI 39.66 kg/m2          Nurses notes and VS reviewed.       Physical Examination: General appearance - alert, well appearing, and in no distress  Chest - clear to auscultation, no wheezes, rales or rhonchi, symmetric air entry  Heart - normal rate, regular rhythm, normal S1, S2, no murmurs, rubs, clicks or gallops  Musculoskeletal - no joint tenderness, deformity or swelling  Extremities - peripheral pulses normal, no pedal edema, no clubbing or cyanosis      Labwork and Ancillary Studies:    CBC w/Diff  Lab Results   Component Value Date/Time    WBC 6.1 08/22/2016 08:34 AM    HGB 14.7 08/22/2016 08:34 AM    PLATELET 200 76/39/3338 08:34 AM         Basic Metabolic Profile  Lab Results   Component Value Date/Time    Sodium 139 08/22/2016 08:34 AM    Potassium 4.2 08/22/2016 08:34 AM    Chloride 104 08/22/2016 08:34 AM    CO2 26 08/22/2016 08:34 AM    Anion gap 9 08/22/2016 08:34 AM    Glucose 122 08/22/2016 08:34 AM    BUN 23 08/22/2016 08:34 AM    Creatinine 1.22 08/22/2016 08:34 AM    BUN/Creatinine ratio 19 08/22/2016 08:34 AM    GFR est AA 52 08/22/2016 08:34 AM    GFR est non-AA 43 08/22/2016 08:34 AM    Calcium 8.8 08/22/2016 08:34 AM         LFT  Lab Results   Component Value Date/Time    ALT (SGPT) 22 08/22/2016 08:34 AM    AST (SGOT) 18 08/22/2016 08:34 AM    Alk.  phosphatase 70 08/22/2016 08:34 AM    Bilirubin, direct 0.2 08/22/2016 08:34 AM    Bilirubin, total 0.8 08/22/2016 08:34 AM         Cholesterol  Lab Results   Component Value Date/Time    Cholesterol, total 148 08/22/2016 08:34 AM    HDL Cholesterol 50 08/22/2016 08:34 AM    LDL, calculated 69.2 08/22/2016 08:34 AM    Triglyceride 144 08/22/2016 08:34 AM    CHOL/HDL Ratio 3.0 08/22/2016 08:34 AM

## 2017-03-27 NOTE — MR AVS SNAPSHOT
Visit Information Date & Time Provider Department Dept. Phone Encounter #  
 3/27/2017  2:00 PM Jackelyn Robbins, [facility] [phone] [id number] Your Appointments 4/26/2017 10:15 AM  
Follow Up with Jackelyn Robbins MD  
3 [facility]) Appt Note: 1 month f/u; r/s  
 8[address] [facility] [address] [place] [id number] [phone]  
  
   
 [address] Donna Ingram [address] Upcoming Health Maintenance Date Due Pneumococcal 65+ Low/Medium Risk (2 of 2 - PCV13) 1/1/2016 COLONOSCOPY 6/14/2016 FOOT EXAM Q1 7/20/2017 MEDICARE YEARLY EXAM 7/21/2017 HEMOGLOBIN A1C Q6M 7/24/2017 MICROALBUMIN Q1 8/22/2017 LIPID PANEL Q1 8/22/2017 EYE EXAM RETINAL OR DILATED Q1 2/25/2018 GLAUCOMA SCREENING Q2Y 2/25/2019 DTaP/Tdap/Td series (2 - Td) 6/15/2025 Allergies as of 3/27/2017  Review Complete On: 3/27/2017 By: Juana Lam LPN No Known Allergies Current Immunizations  Reviewed on 1/24/2017 Name Date Influenza High Dose Vaccine PF 9/14/2015 11:59 AM  
 Influenza Vaccine 11/1/2016 12:00 AM, 9/23/2014, 11/13/2013 Pneumococcal Polysaccharide (PPSV-23) 1/1/2015 12:00 AM, 9/23/2014  9:42 AM  
 Tdap 6/15/2015 Zoster Vaccine, Live 9/23/2012 Not reviewed this visit Vitals BP Pulse Temp Resp Height(growth percentile) Weight(growth percentile) 142/84 82 98.3 °F (36.8 °C) 22 5' 5.5\" (1.664 m) 242 lb (109.8 kg) SpO2 BMI OB Status Smoking Status 95% 39.66 kg/m2 Postmenopausal Never Smoker Vitals History BMI and BSA Data Body Mass Index Body Surface Area  
 39.66 kg/m 2 2.25 m 2 Preferred Pharmacy Pharmacy Name Phone [address] [facility] [phone] Your Updated Medication List  
  
   
This list is accurate as of: 3/27/17  2:38 PM.  Always use your most recent med list.  
  
  
  
  
 * apixaban 5 mg tablet Commonly known as:  Bryant Croissant Take 10 mg by mouth two (2) times a day. For 5 days * apixaban 5 mg tablet Commonly known as:  Bryant Croissant Take 1 Tab by mouth two (2) times a day. for DVT,PE  
  
 DETROL LA 4 mg ER capsule Generic drug:  tolterodine ER  
TAKE 1 CAPSULE DAILY docusate sodium 100 mg capsule Commonly known as:  Doc Ilda Take 100 mg by mouth daily. lisinopril 5 mg tablet Commonly known as:  Anne Mary Take 5 mg by mouth daily. metFORMIN  mg tablet Commonly known as:  GLUCOPHAGE XR Take 1 Tab by mouth daily (with dinner). oxyCODONE IR 5 mg immediate release tablet Commonly known as:  Benjaman Hurl Take 5-10 mg by mouth every four (4) hours as needed for Pain.  
  
 simvastatin 5 mg tablet Commonly known as:  ZOCOR Take 1 Tab by mouth nightly. TYLENOL 325 mg tablet Generic drug:  acetaminophen Take  by mouth every four (4) hours as needed for Pain. VITAMIN D2 50,000 unit capsule Generic drug:  ergocalciferol TAKE 1 CAPSULE EVERY 7 DAYS * Notice: This list has 2 medication(s) that are the same as other medications prescribed for you. Read the directions carefully, and ask your doctor or other care provider to review them with you. Patient Instructions Deep Vein Thrombosis: Care Instructions Your Care Instructions A deep vein thrombosis (DVT) is a blood clot in certain veins of the legs, pelvis, or arms. Blood clots in these veins need to be treated because they can get bigger, break loose, and travel through the bloodstream to the lungs. A blood clot in a lung can be life-threatening. The doctor may have given you a blood thinner (anticoagulant). A blood thinner can stop the blood clot from growing larger and prevent new clots from forming. You will need to take a blood thinner for 3 to 6 months or longer. The doctor has checked you carefully, but problems can develop later. If you notice any problems or new symptoms, get medical treatment right away. Follow-up care is a key part of your treatment and safety. Be sure to make and go to all appointments, and call your doctor if you are having problems. It's also a good idea to know your test results and keep a list of the medicines you take. How can you care for yourself at home? · Take your medicines exactly as prescribed. Call your doctor if you think you are having a problem with your medicine. · If you are taking a blood thinner, be sure you get instructions about how to take your medicine safely. Blood thinners can cause serious bleeding problems. · Wear compression stockings if your doctor recommends them. These stockings are tighter at the feet than on the legs. They may reduce pain and swelling in your legs. You can get them with a prescription at a medical supply store or at some drugstores. · When you sit, use a pillow to raise the arm or leg that has the blood clot. Try to keep it above the level of your heart. When should you call for help? Call 911 anytime you think you may need emergency care. For example, call if: 
· You passed out (lost consciousness). · You have symptoms of a blood clot in your lung (called a pulmonary embolism). These include: 
¨ Sudden chest pain. ¨ Trouble breathing. ¨ Coughing up blood. Call your doctor now or seek immediate medical care if: 
· You have new or worse trouble breathing. · You are dizzy or lightheaded, or you feel like you may faint. · You have symptoms of a blood clot in your arm or leg. These may include: 
¨ Pain in the arm, calf, back of the knee, thigh, or groin. ¨ Redness and swelling in the arm, leg, or groin. Watch closely for changes in your health, and be sure to contact your doctor if: 
· You do not get better as expected. Where can you learn more? Go to http://tamie-alberto.info/. Enter I847 in the search box to learn more about \"Deep Vein Thrombosis: Care Instructions. \" Current as of: June 4, 2016 Content Version: 11.1 © 7447-6895 Health Information Designs. Care instructions adapted under license by MobilyTrip (which disclaims liability or warranty for this information). If you have questions about a medical condition or this instruction, always ask your healthcare professional. [address] any warranty or liability for your use of this information. Introducing [facility] & HEALTH SERVICES! Dear Sakshi Nunez: Thank you for requesting a Your Policy Manager account. Our records indicate that you already have an active Your Policy Manager account. You can access your account anytime at [email or web address] Did you know that you can access your hospital and ER discharge instructions at any time in Your Policy Manager? You can also review all of your test results from your hospital stay or ER visit. Additional Information If you have questions, please visit the Frequently Asked Questions section of the Your Policy Manager website at [email or web address] Remember, Your Policy Manager is NOT to be used for urgent needs. For medical emergencies, dial 911. Now available from your iPhone and Android! Please provide this summary of care documentation to your next provider. Your primary care clinician is listed as Amber 51. If you have any questions after today's visit, please call [phone].

## 2017-03-30 DIAGNOSIS — R06.02 SOB (SHORTNESS OF BREATH) ON EXERTION: ICD-10-CM

## 2017-04-02 RX ORDER — METFORMIN HYDROCHLORIDE 500 MG/1
TABLET, EXTENDED RELEASE ORAL
Qty: 90 TAB | Refills: 1 | Status: SHIPPED | OUTPATIENT
Start: 2017-04-02 | End: 2018-09-04 | Stop reason: SDUPTHER

## 2017-04-02 RX ORDER — SIMVASTATIN 5 MG/1
TABLET, FILM COATED ORAL
Qty: 90 TAB | Refills: 1 | Status: SHIPPED | OUTPATIENT
Start: 2017-04-02 | End: 2017-09-29 | Stop reason: SDUPTHER

## 2017-04-07 ENCOUNTER — PATIENT OUTREACH (OUTPATIENT)
Dept: FAMILY MEDICINE CLINIC | Age: 77
End: 2017-04-07

## 2017-04-10 ENCOUNTER — PATIENT OUTREACH (OUTPATIENT)
Dept: FAMILY MEDICINE CLINIC | Age: 77
End: 2017-04-10

## 2017-04-11 ENCOUNTER — TELEPHONE (OUTPATIENT)
Dept: FAMILY MEDICINE CLINIC | Age: 77
End: 2017-04-11

## 2017-04-11 ENCOUNTER — OFFICE VISIT (OUTPATIENT)
Dept: FAMILY MEDICINE CLINIC | Age: 77
End: 2017-04-11

## 2017-04-11 VITALS
OXYGEN SATURATION: 94 % | HEIGHT: 66 IN | TEMPERATURE: 97.8 F | HEART RATE: 83 BPM | RESPIRATION RATE: 18 BRPM | BODY MASS INDEX: 38.57 KG/M2 | SYSTOLIC BLOOD PRESSURE: 138 MMHG | WEIGHT: 240 LBS | DIASTOLIC BLOOD PRESSURE: 84 MMHG

## 2017-04-11 DIAGNOSIS — R06.00 DYSPNEA, UNSPECIFIED TYPE: Primary | ICD-10-CM

## 2017-04-11 DIAGNOSIS — I26.92 ACUTE SADDLE PULMONARY EMBOLISM WITHOUT ACUTE COR PULMONALE (HCC): ICD-10-CM

## 2017-04-11 DIAGNOSIS — I82.4Z1 ACUTE EMBOLISM AND THROMBOSIS OF DEEP VEIN OF RIGHT DISTAL LOWER EXTREMITY (HCC): ICD-10-CM

## 2017-04-11 NOTE — PROGRESS NOTES
Assessment/Plan:    Tayler Guillen was seen today for chest pain. Diagnoses and all orders for this visit:    Dyspnea, unspecified type  -     AMB POC EKG ROUTINE W/ 12 LEADS, INTER & REP    Acute embolism and thrombosis of deep vein of right distal lower extremity ([address]    Acute saddle pulmonary embolism without acute cor pulmonale ([address]        Has rout f/u with me later this month. Reassured pt that her symptoms are just part of her healing and will continue to get better. It has only been 4 weeks since she was started on treatment. The plan was discussed with the patient. The patient verbalized understanding and is in agreement with the plan. All medication potential side effects were discussed with the patient.    -------------------------------------------------------------------------------------------------------------------        Shine Gallo is a 78 y.o. female and presents with Chest Pain         Subjective:  Pt called us today while her home health PT was working with her. HH was concerned b/c pt became somewhat SOB walking to her mailbox. This was the first time she did this with [facility] present. Pt is taking her blood thinner daily, as advised. Does NOT miss doses. Her SOB was short lived and improved within minutes of sitting. She is fine now. She wa diagnosed with extensive emboli in lungs and RT leg. ROS:  Constitutional: No recent weight change. No weakness/fatigue. No f/c. Skin: No rashes, change in nails/hair, itching   HENT: No HA, dizziness. No hearing loss/tinnitus. No nasal congestion/discharge. Eyes: No change in vision, double/blurred vision or eye pain/redness. Cardiovascular: No CP/palpitations. No SZYMANSKI/orthopnea/PND. Respiratory: No cough/sputum, dyspnea, wheezing. Gastointestinal: No dysphagia, reflux. No n/v. No constipation/diarrhea. No melena/rectal bleeding. Genitourinary: No dysuria, urinary hesitancy, nocturia, hematuria. No incontinence. Musculoskeletal: No joint pain/stiffness. No muscle pain/tenderness. Endo: No heat/cold intolerance, no polyuria/polydypsia. Heme: No h/o anemia. No easy bleeding/bruising. Allergy/Immunology: No seasonal rhinitis. Denies frequent colds, sinus/ear infections. Neurological: No seizures/numbness/weakness. No paresthesias. Psychiatric:  No depression, anxiety. The problem list was updated as a part of today's visit. Patient Active Problem List   Diagnosis Code    HTN (hypertension) I10    Arthritis M19.90    Left femoral vein DVT ([address] I82.412    Diabetes mellitus, type 2 ([address] E11.9    Degenerative disc disease, lumbar M51.36    HLD (hyperlipidemia) E78.5    Acute embolism and thrombosis of deep vein of right distal lower extremity ([address] I82.4Z1    Saddle embolus of pulmonary artery without acute cor pulmonale (HCC) I26.92    Aftercare following joint replacement surgery Z47.1    Knee pain M25.569    Hypoxia R09.02    Primary localized osteoarthrosis of pelvic region M16.10    Osteoarthritis of knee M17.10    Pulmonary embolism (HCC) I26.99    Shortness of breath R06.02    History of total knee replacement Z96.659       The PSH, FH were reviewed. SH:  Social History   Substance Use Topics    Smoking status: Never Smoker    Smokeless tobacco: Never Used    Alcohol use No       Medications/Allergies:  Current Outpatient Prescriptions on File Prior to Visit   Medication Sig Dispense Refill    simvastatin (ZOCOR) 5 mg tablet TAKE 1 TABLET NIGHTLY 90 Tab 1    metFORMIN ER (GLUCOPHAGE XR) 500 mg tablet TAKE 1 TABLET DAILY (WITH DINNER) 90 Tab 1    lisinopril (PRINIVIL, ZESTRIL) 5 mg tablet Take 1 Tab by mouth daily. 90 Tab 2    apixaban (ELIQUIS) 5 mg tablet Take 1 Tab by mouth two (2) times a day.  for DVT, Tab 2    DETROL LA 4 mg ER capsule TAKE 1 CAPSULE DAILY 90 Cap 1    VITAMIN D2 50,000 unit capsule TAKE 1 CAPSULE EVERY 7 DAYS 12 Cap 2    acetaminophen (TYLENOL) 325 mg tablet Take  by mouth every four (4) hours as needed for Pain.  oxyCODONE IR (ROXICODONE) 5 mg immediate release tablet Take 5-10 mg by mouth every four (4) hours as needed for Pain.  docusate sodium (COLACE) 100 mg capsule Take 100 mg by mouth daily. No current facility-administered medications on file prior to visit. No Known Allergies      Health Maintenance:   Health Maintenance   Topic Date Due    Pneumococcal 65+ Low/Medium Risk (2 of 2 - PCV13) 01/01/2016    COLONOSCOPY  06/14/2016    FOOT EXAM Q1  07/20/2017    MEDICARE YEARLY EXAM  07/21/2017    HEMOGLOBIN A1C Q6M  07/24/2017    MICROALBUMIN Q1  08/22/2017    LIPID PANEL Q1  08/22/2017    EYE EXAM RETINAL OR DILATED Q1  02/25/2018    GLAUCOMA SCREENING Q2Y  02/25/2019    DTaP/Tdap/Td series (2 - Td) 06/15/2025    OSTEOPOROSIS SCREENING (DEXA)  Completed    ZOSTER VACCINE AGE 60>  Completed    INFLUENZA AGE 9 TO ADULT  Completed       Objective:  Visit Vitals    /84    Pulse 83    Temp 97.8 °F (36.6 °C)    Resp 18    Ht 5' 5.5\" (1.664 m)    Wt 240 lb (108.9 kg)    SpO2 94%    BMI 39.33 kg/m2          Nurses notes and VS reviewed.       Physical Examination: General appearance - alert, well appearing, and in no distress  Chest - clear to auscultation, no wheezes, rales or rhonchi, symmetric air entry  Heart - normal rate, regular rhythm, normal S1, S2, no murmurs, rubs, clicks or gallops      Labwork and Ancillary Studies:    CBC w/Diff  Lab Results   Component Value Date/Time    WBC 6.1 08/22/2016 08:34 AM    HGB 14.7 08/22/2016 08:34 AM    PLATELET 648 70/62/8876 08:34 AM         Basic Metabolic Profile  Lab Results   Component Value Date/Time    Sodium 139 08/22/2016 08:34 AM    Potassium 4.2 08/22/2016 08:34 AM    Chloride 104 08/22/2016 08:34 AM    CO2 26 08/22/2016 08:34 AM    Anion gap 9 08/22/2016 08:34 AM    Glucose 122 08/22/2016 08:34 AM    BUN 23 08/22/2016 08:34 AM    Creatinine 1.22 08/22/2016 08:34 AM    BUN/Creatinine ratio 19 08/22/2016 08:34 AM    GFR est AA 52 08/22/2016 08:34 AM    GFR est non-AA 43 08/22/2016 08:34 AM    Calcium 8.8 08/22/2016 08:34 AM         LFT  Lab Results   Component Value Date/Time    ALT (SGPT) 22 08/22/2016 08:34 AM    AST (SGOT) 18 08/22/2016 08:34 AM    Alk.  phosphatase 70 08/22/2016 08:34 AM    Bilirubin, direct 0.2 08/22/2016 08:34 AM    Bilirubin, total 0.8 08/22/2016 08:34 AM         Cholesterol  Lab Results   Component Value Date/Time    Cholesterol, total 148 08/22/2016 08:34 AM    HDL Cholesterol 50 08/22/2016 08:34 AM    LDL, calculated 69.2 08/22/2016 08:34 AM    Triglyceride 144 08/22/2016 08:34 AM    CHOL/HDL Ratio 3.0 08/22/2016 08:34 AM

## 2017-04-11 NOTE — TELEPHONE ENCOUNTER
Pt is coming in for a quick followup, SOB on walking with therapist, small intermittent chest pain, right sided.

## 2017-04-11 NOTE — TELEPHONE ENCOUNTER
Adriana Harrison called stating pt is complaining of chest pain \"that comes and goes\", leg swelling, and SOB    Call transferred to [facility] for triage

## 2017-04-11 NOTE — PROGRESS NOTES
Bere Cornelius is a 78 y.o. female here today with complaints of chest pain and shortness of breath. 1. Have you been to the ER, urgent care clinic since your last visit? Hospitalized since your last visit? [facility] 3/17/17 pulmonary embolism, DVT    2. Have you seen or consulted any other health care providers outside of the [address] [place] [address] since your last visit? Include any pap smears or colon screening.  Yes Reason for visit: PT, pulmonary

## 2017-04-11 NOTE — PATIENT INSTRUCTIONS

## 2017-04-11 NOTE — MR AVS SNAPSHOT
Visit Information Date & Time Provider Department Dept. Phone Encounter #  
 4/11/2017 10:15 AM Tu Torres, [address] [phone] [id number] Your Appointments 4/26/2017 10:15 AM  
Follow Up with Tu Torres MD  
[address] [facility]) Appt Note: 1 month f/u; r/s  
 [address] [facility] [address] [facility] [address] [phone]  
  
   
 [address] [facility] Upcoming Health Maintenance Date Due Pneumococcal 65+ Low/Medium Risk (2 of 2 - PCV13) 1/1/2016 COLONOSCOPY 6/14/2016 FOOT EXAM Q1 7/20/2017 MEDICARE YEARLY EXAM 7/21/2017 HEMOGLOBIN A1C Q6M 7/24/2017 MICROALBUMIN Q1 8/22/2017 LIPID PANEL Q1 8/22/2017 EYE EXAM RETINAL OR DILATED Q1 2/25/2018 GLAUCOMA SCREENING Q2Y 2/25/2019 DTaP/Tdap/Td series (2 - Td) 6/15/2025 Allergies as of 4/11/2017  Review Complete On: 3/27/2017 By: Tu Torres MD  
 No Known Allergies Current Immunizations  Reviewed on 1/24/2017 Name Date Influenza High Dose Vaccine PF 9/14/2015 11:59 AM  
 Influenza Vaccine 11/1/2016 12:00 AM, 9/23/2014, 11/13/2013 Pneumococcal Polysaccharide (PPSV-23) 1/1/2015 12:00 AM, 9/23/2014  9:42 AM  
 Tdap 6/15/2015 Zoster Vaccine, Live 9/23/2012 Not reviewed this visit You Were Diagnosed With   
  
 Codes Comments Dyspnea, unspecified type    -  Primary ICD-10-CM: R06.00 
ICD-9-CM: 786.09 Vitals BP Pulse Temp Resp Height(growth percentile) Weight(growth percentile) 138/84 83 97.8 °F (36.6 °C) 18 5' 5.5\" (1.664 m) 240 lb (108.9 kg) SpO2 BMI OB Status Smoking Status 94% 39.33 kg/m2 Postmenopausal Never Smoker Vitals History BMI and BSA Data Body Mass Index Body Surface Area  
 39.33 kg/m 2 2.24 m 2 Preferred Pharmacy Pharmacy Name Phone  [address] [facility] [phone] Your Updated Medication List  
  
   
This list is accurate as of: 4/11/17 11:06 AM.  Always use your most recent med list.  
  
  
  
  
 apixaban 5 mg tablet Commonly known as:  Pasty Ramal Take 1 Tab by mouth two (2) times a day. for DVT,PE  
  
 DETROL LA 4 mg ER capsule Generic drug:  tolterodine ER  
TAKE 1 CAPSULE DAILY docusate sodium 100 mg capsule Commonly known as:  Monet Rivera Take 100 mg by mouth daily. lisinopril 5 mg tablet Commonly known as:  Jesus Manuel Banner Take 1 Tab by mouth daily. metFORMIN  mg tablet Commonly known as:  GLUCOPHAGE XR  
TAKE 1 TABLET DAILY (WITH DINNER) oxyCODONE IR 5 mg immediate release tablet Commonly known as:  Ha  Take 5-10 mg by mouth every four (4) hours as needed for Pain.  
  
 simvastatin 5 mg tablet Commonly known as:  ZOCOR  
TAKE 1 TABLET NIGHTLY  
  
 TYLENOL 325 mg tablet Generic drug:  acetaminophen Take  by mouth every four (4) hours as needed for Pain. VITAMIN D2 50,000 unit capsule Generic drug:  ergocalciferol TAKE 1 CAPSULE EVERY 7 DAYS We Performed the Following AMB POC EKG ROUTINE W/ 12 LEADS, INTER & REP [42061 CPT(R)] Patient Instructions Learning About Diabetes Food Guidelines Your Care Instructions Meal planning is important to manage diabetes. It helps keep your blood sugar at a target level (which you set with your doctor). You don't have to eat special foods. You can eat what your family eats, including sweets once in a while. But you do have to pay attention to how often you eat and how much you eat of certain foods. You may want to work with a dietitian or a certified diabetes educator (CDE) to help you plan meals and snacks. A dietitian or CDE can also help you lose weight if that is one of your goals. What should you know about eating carbs?  
Managing the amount of carbohydrate (carbs) you eat is an important part of healthy meals when you have diabetes. Carbohydrate is found in many foods. · Learn which foods have carbs. And learn the amounts of carbs in different foods. ¨ Bread, cereal, pasta, and rice have about 15 grams of carbs in a serving. A serving is 1 slice of bread (1 ounce), ½ cup of cooked cereal, or 1/3 cup of cooked pasta or rice. ¨ Fruits have 15 grams of carbs in a serving. A serving is 1 small fresh fruit, such as an apple or orange; ½ of a banana; ½ cup of cooked or canned fruit; ½ cup of fruit juice; 1 cup of melon or raspberries; or 2 tablespoons of dried fruit. ¨ Milk and no-sugar-added yogurt have 15 grams of carbs in a serving. A serving is 1 cup of milk or 2/3 cup of no-sugar-added yogurt. ¨ Starchy vegetables have 15 grams of carbs in a serving. A serving is ½ cup of mashed potatoes or sweet potato; 1 cup winter squash; ½ of a small baked potato; ½ cup of cooked beans; or ½ cup cooked corn or green peas. · Learn how much carbs to eat each day and at each meal. A dietitian or CDE can teach you how to keep track of the amount of carbs you eat. This is called carbohydrate counting. · If you are not sure how to count carbohydrate grams, use the Plate Method to plan meals. It is a good, quick way to make sure that you have a balanced meal. It also helps you spread carbs throughout the day. ¨ Divide your plate by types of foods. Put non-starchy vegetables on half the plate, meat or other protein food on one-quarter of the plate, and a grain or starchy vegetable in the final quarter of the plate. To this you can add a small piece of fruit and 1 cup of milk or yogurt, depending on how many carbs you are supposed to eat at a meal. 
· Try to eat about the same amount of carbs at each meal. Do not \"save up\" your daily allowance of carbs to eat at one meal. 
· Proteins have very little or no carbs per serving.  Examples of proteins are beef, chicken, turkey, fish, eggs, tofu, cheese, cottage cheese, and peanut butter. A serving size of meat is 3 ounces, which is about the size of a deck of cards. Examples of meat substitute serving sizes (equal to 1 ounce of meat) are 1/4 cup of cottage cheese, 1 egg, 1 tablespoon of peanut butter, and ½ cup of tofu. How can you eat out and still eat healthy? · Learn to estimate the serving sizes of foods that have carbohydrate. If you measure food at home, it will be easier to estimate the amount in a serving of restaurant food. · If the meal you order has too much carbohydrate (such as potatoes, corn, or baked beans), ask to have a low-carbohydrate food instead. Ask for a salad or green vegetables. · If you use insulin, check your blood sugar before and after eating out to help you plan how much to eat in the future. · If you eat more carbohydrate at a meal than you had planned, take a walk or do other exercise. This will help lower your blood sugar. What else should you know? · Limit saturated fat, such as the fat from meat and dairy products. This is a healthy choice because people who have diabetes are at higher risk of heart disease. So choose lean cuts of meat and nonfat or low-fat dairy products. Use olive or canola oil instead of butter or shortening when cooking. · Don't skip meals. Your blood sugar may drop too low if you skip meals and take insulin or certain medicines for diabetes. · Check with your doctor before you drink alcohol. Alcohol can cause your blood sugar to drop too low. Alcohol can also cause a bad reaction if you take certain diabetes medicines. Follow-up care is a key part of your treatment and safety. Be sure to make and go to all appointments, and call your doctor if you are having problems. It's also a good idea to know your test results and keep a list of the medicines you take. Where can you learn more? Go to http://tamie-alberto.info/.  
Enter S510 in the search box to learn more about \"Learning About Diabetes Food Guidelines. \" Current as of: May 23, 2016 Content Version: 11.2 © 1172-2562 Innova Card, Pocketbook. Care instructions adapted under license by TechForward (which disclaims liability or warranty for this information). If you have questions about a medical condition or this instruction, always ask your healthcare professional. [address] any warranty or liability for your use of this information. Introducing [facility] & HE[facility]! Dear Char Hagan: Thank you for requesting a Semantify account. Our records indicate that you already have an active Semantify account. You can access your account anytime at [email or web address] Did you know that you can access your hospital and ER discharge instructions at any time in Semantify? You can also review all of your test results from your hospital stay or ER visit. Additional Information If you have questions, please visit the Frequently Asked Questions section of the Semantify website at [email or web address] Remember, Semantify is NOT to be used for urgent needs. For medical emergencies, dial 911. Now available from your iPhone and Android! Please provide this summary of care documentation to your next provider. Your primary care clinician is listed as Amber 51. If you have any questions after today's visit, please call [phone].

## 2017-04-24 ENCOUNTER — PATIENT OUTREACH (OUTPATIENT)
Dept: FAMILY MEDICINE CLINIC | Age: 77
End: 2017-04-24

## 2017-04-24 NOTE — PROGRESS NOTES
Called patient to follow up . Doing well no complaints. , no readmissions this visit. This episode is closed

## 2017-04-26 ENCOUNTER — OFFICE VISIT (OUTPATIENT)
Dept: FAMILY MEDICINE CLINIC | Age: 77
End: 2017-04-26

## 2017-04-26 VITALS
TEMPERATURE: 98.1 F | RESPIRATION RATE: 22 BRPM | HEIGHT: 66 IN | SYSTOLIC BLOOD PRESSURE: 132 MMHG | HEART RATE: 70 BPM | DIASTOLIC BLOOD PRESSURE: 84 MMHG | WEIGHT: 239.4 LBS | BODY MASS INDEX: 38.47 KG/M2 | OXYGEN SATURATION: 96 %

## 2017-04-26 DIAGNOSIS — I10 ESSENTIAL HYPERTENSION: ICD-10-CM

## 2017-04-26 DIAGNOSIS — E55.9 HYPOVITAMINOSIS D: ICD-10-CM

## 2017-04-26 DIAGNOSIS — Z71.89 ADVANCE CARE PLANNING: ICD-10-CM

## 2017-04-26 DIAGNOSIS — E11.9 TYPE 2 DIABETES MELLITUS WITHOUT COMPLICATION, WITHOUT LONG-TERM CURRENT USE OF INSULIN (HCC): Primary | ICD-10-CM

## 2017-04-26 LAB — HBA1C MFR BLD HPLC: 5.7 %

## 2017-04-26 NOTE — MR AVS SNAPSHOT
Visit Information Date & Time Provider Department Dept. Phone Encounter #  
 4/26/2017 10:15 AM Ayaan Valencia, 3 [facility] [phone] [id number] Upcoming Health Maintenance Date Due Pneumococcal 65+ Low/Medium Risk (2 of 2 - PCV13) 1/1/2016 COLONOSCOPY 6/14/2016 FOOT EXAM Q1 7/20/2017 MEDICARE YEARLY EXAM 7/21/2017 HEMOGLOBIN A1C Q6M 7/24/2017 MICROALBUMIN Q1 8/22/2017 LIPID PANEL Q1 8/22/2017 EYE EXAM RETINAL OR DILATED Q1 2/25/2018 GLAUCOMA SCREENING Q2Y 2/25/2019 DTaP/Tdap/Td series (2 - Td) 6/15/2025 Allergies as of 4/26/2017  Review Complete On: 4/26/2017 By: Ayaan Valencia MD  
 No Known Allergies Current Immunizations  Reviewed on 1/24/2017 Name Date Influenza High Dose Vaccine PF 9/14/2015 11:59 AM  
 Influenza Vaccine 11/1/2016 12:00 AM, 9/23/2014, 11/13/2013 Pneumococcal Polysaccharide (PPSV-23) 1/1/2015 12:00 AM, 9/23/2014  9:42 AM  
 Tdap 6/15/2015 Zoster Vaccine, Live 9/23/2012 Not reviewed this visit You Were Diagnosed With   
  
 Codes Comments Type 2 diabetes mellitus without complication, without long-term current use of insulin (HCC)    -  Primary ICD-10-CM: E11.9 ICD-9-CM: 250.00 Essential hypertension     ICD-10-CM: I10 
ICD-9-CM: 401.9 Hypovitaminosis D     ICD-10-CM: E55.9 ICD-9-CM: 268.9 Vitals BP Pulse Temp Resp Height(growth percentile) Weight(growth percentile) 132/84 (BP 1 Location: Left arm, BP Patient Position: Sitting) 70 98.1 °F (36.7 °C) (Oral) 22 5' 5.5\" (1.664 m) 239 lb 6.4 oz (108.6 kg) SpO2 BMI OB Status Smoking Status 96% 39.23 kg/m2 Postmenopausal Never Smoker BMI and BSA Data Body Mass Index Body Surface Area  
 39.23 kg/m 2 2.24 m 2 Preferred Pharmacy Pharmacy Name Phone [address] [facility] [phone] Your Updated Medication List  
  
   
 This list is accurate as of: 4/26/17 11:07 AM.  Always use your most recent med list.  
  
  
  
  
 apixaban 5 mg tablet Commonly known as:  Stephanie Desouza Take 1 Tab by mouth two (2) times a day. for DVT,PE  
  
 DETROL LA 4 mg ER capsule Generic drug:  tolterodine ER  
TAKE 1 CAPSULE DAILY  
  
 lisinopril 5 mg tablet Commonly known as:  Jarrett Headings Take 1 Tab by mouth daily. metFORMIN  mg tablet Commonly known as:  GLUCOPHAGE XR  
TAKE 1 TABLET DAILY (WITH DINNER) simvastatin 5 mg tablet Commonly known as:  ZOCOR  
TAKE 1 TABLET NIGHTLY  
  
 TYLENOL 325 mg tablet Generic drug:  acetaminophen Take  by mouth every four (4) hours as needed for Pain. VITAMIN D2 50,000 unit capsule Generic drug:  ergocalciferol TAKE 1 CAPSULE EVERY 7 DAYS We Performed the Following AMB POC HEMOGLOBIN A1C [23051 CPT(R)] To-Do List   
 04/26/2017 Lab:  CBC WITH AUTOMATED DIFF   
  
 04/26/2017 Lab:  HEMOGLOBIN A1C W/O EAG   
  
 04/26/2017 Lab:  HEPATIC FUNCTION PANEL   
  
 04/26/2017 Lab:  LIPID PANEL   
  
 04/26/2017 Lab:  METABOLIC PANEL, BASIC   
  
 04/26/2017 Lab:  MICROALBUMIN, UR, RAND W/ MICROALBUMIN/CREA RATIO   
  
 04/26/2017 Lab:  T4, FREE   
  
 04/26/2017 Lab:  TSH 3RD GENERATION   
  
 04/26/2017 Lab:  URINALYSIS W/ RFLX MICROSCOPIC   
  
 04/26/2017 Lab:  VITAMIN D, 25 HYDROXY Patient Instructions Learning About Diabetes Food Guidelines Your Care Instructions Meal planning is important to manage diabetes. It helps keep your blood sugar at a target level (which you set with your doctor). You don't have to eat special foods. You can eat what your family eats, including sweets once in a while. But you do have to pay attention to how often you eat and how much you eat of certain foods.  
You may want to work with a dietitian or a certified diabetes educator (CDE) to help you plan meals and snacks. A dietitian or CDE can also help you lose weight if that is one of your goals. What should you know about eating carbs? Managing the amount of carbohydrate (carbs) you eat is an important part of healthy meals when you have diabetes. Carbohydrate is found in many foods. · Learn which foods have carbs. And learn the amounts of carbs in different foods. ¨ Bread, cereal, pasta, and rice have about 15 grams of carbs in a serving. A serving is 1 slice of bread (1 ounce), ½ cup of cooked cereal, or 1/3 cup of cooked pasta or rice. ¨ Fruits have 15 grams of carbs in a serving. A serving is 1 small fresh fruit, such as an apple or orange; ½ of a banana; ½ cup of cooked or canned fruit; ½ cup of fruit juice; 1 cup of melon or raspberries; or 2 tablespoons of dried fruit. ¨ Milk and no-sugar-added yogurt have 15 grams of carbs in a serving. A serving is 1 cup of milk or 2/3 cup of no-sugar-added yogurt. ¨ Starchy vegetables have 15 grams of carbs in a serving. A serving is ½ cup of mashed potatoes or sweet potato; 1 cup winter squash; ½ of a small baked potato; ½ cup of cooked beans; or ½ cup cooked corn or green peas. · Learn how much carbs to eat each day and at each meal. A dietitian or CDE can teach you how to keep track of the amount of carbs you eat. This is called carbohydrate counting. · If you are not sure how to count carbohydrate grams, use the Plate Method to plan meals. It is a good, quick way to make sure that you have a balanced meal. It also helps you spread carbs throughout the day. ¨ Divide your plate by types of foods. Put non-starchy vegetables on half the plate, meat or other protein food on one-quarter of the plate, and a grain or starchy vegetable in the final quarter of the plate.  To this you can add a small piece of fruit and 1 cup of milk or yogurt, depending on how many carbs you are supposed to eat at a meal. 
 · Try to eat about the same amount of carbs at each meal. Do not \"save up\" your daily allowance of carbs to eat at one meal. 
· Proteins have very little or no carbs per serving. Examples of proteins are beef, chicken, turkey, fish, eggs, tofu, cheese, cottage cheese, and peanut butter. A serving size of meat is 3 ounces, which is about the size of a deck of cards. Examples of meat substitute serving sizes (equal to 1 ounce of meat) are 1/4 cup of cottage cheese, 1 egg, 1 tablespoon of peanut butter, and ½ cup of tofu. How can you eat out and still eat healthy? · Learn to estimate the serving sizes of foods that have carbohydrate. If you measure food at home, it will be easier to estimate the amount in a serving of restaurant food. · If the meal you order has too much carbohydrate (such as potatoes, corn, or baked beans), ask to have a low-carbohydrate food instead. Ask for a salad or green vegetables. · If you use insulin, check your blood sugar before and after eating out to help you plan how much to eat in the future. · If you eat more carbohydrate at a meal than you had planned, take a walk or do other exercise. This will help lower your blood sugar. What else should you know? · Limit saturated fat, such as the fat from meat and dairy products. This is a healthy choice because people who have diabetes are at higher risk of heart disease. So choose lean cuts of meat and nonfat or low-fat dairy products. Use olive or canola oil instead of butter or shortening when cooking. · Don't skip meals. Your blood sugar may drop too low if you skip meals and take insulin or certain medicines for diabetes. · Check with your doctor before you drink alcohol. Alcohol can cause your blood sugar to drop too low. Alcohol can also cause a bad reaction if you take certain diabetes medicines. Follow-up care is a key part of your treatment and safety.  Be sure to make and go to all appointments, and call your doctor if you are having problems. It's also a good idea to know your test results and keep a list of the medicines you take. Where can you learn more? Go to http://tamie-alberto.info/. Enter C358 in the search box to learn more about \"Learning About Diabetes Food Guidelines. \" Current as of: May 23, 2016 Content Version: 11.2 © 3331-1150 [employer]. Care instructions adapted under license by [facility] (which disclaims liability or warranty for this information). If you have questions about a medical condition or this instruction, always ask your healthcare professional. [place] [address] any warranty or liability for your use of this information. Introducing [facility]! Dear Christopher Hudson: Thank you for requesting a [employer] account. Our records indicate that you already have an active [employer] account. You can access your account anytime at [email or web address] Did you know that you can access your hospital and ER discharge instructions at any time in [employer]? You can also review all of your test results from your hospital stay or ER visit. Additional Information If you have questions, please visit the Frequently Asked Questions section of the [employer] website at [email or web address] Remember, [employer] is NOT to be used for urgent needs. For medical emergencies, dial 911. Now available from your iPhone and Android! Please provide this summary of care documentation to your next provider. Your primary care clinician is listed as Amber 51. If you have any questions after today's visit, please call [phone].

## 2017-04-26 NOTE — PROGRESS NOTES
Assessment/Plan:    Jong Concepcion was seen today for hypertension. Diagnoses and all orders for this visit:    Type 2 diabetes mellitus without complication, without long-term current use of insulin (HCC)  -     AMB POC HEMOGLOBIN A1C  -     CBC WITH AUTOMATED DIFF; Future  -     HEPATIC FUNCTION PANEL; Future  -     LIPID PANEL; Future  -     METABOLIC PANEL, BASIC; Future  -     TSH 3RD GENERATION; Future  -     T4, FREE; Future  -     URINALYSIS W/ RFLX MICROSCOPIC; Future  -     VITAMIN D, 25 HYDROXY; Future  -     HEMOGLOBIN A1C W/O EAG; Future  -     MICROALBUMIN, UR, RAND W/ MICROALBUMIN/CREA RATIO; Future    Essential hypertension    Hypovitaminosis D  -     VITAMIN D, 25 HYDROXY; Future    Advance care planning        Physical in 3 months. The plan was discussed with the patient. The patient verbalized understanding and is in agreement with the plan. All medication potential side effects were discussed with the patient.    -------------------------------------------------------------------------------------------------------------------        Torres Lee is a 78 y.o. female and presents with Hypertension (1 month follow up)         Subjective:  Pt here for f/u. DVT/PE: on meds, overall condition is better, she feels better, less tired. DM: A1c today is 5.7%. HTN: controlled. ROS:  Constitutional: No recent weight change. No weakness/fatigue. No f/c. Skin: No rashes, change in nails/hair, itching   HENT: No HA, dizziness. No hearing loss/tinnitus. No nasal congestion/discharge. Eyes: No change in vision, double/blurred vision or eye pain/redness. Cardiovascular: No CP/palpitations. No SZYMANSKI/orthopnea/PND. Respiratory: No cough/sputum, dyspnea, wheezing. Gastointestinal: No dysphagia, reflux. No n/v. No constipation/diarrhea. No melena/rectal bleeding. Genitourinary: No dysuria, urinary hesitancy, nocturia, hematuria. No incontinence.    Musculoskeletal: No joint pain/stiffness. No muscle pain/tenderness. Endo: No heat/cold intolerance, no polyuria/polydypsia. Heme: No h/o anemia. No easy bleeding/bruising. Allergy/Immunology: No seasonal rhinitis. Denies frequent colds, sinus/ear infections. Neurological: No seizures/numbness/weakness. No paresthesias. Psychiatric:  No depression, anxiety. The problem list was updated as a part of today's visit. 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 SH:  Social History   Substance Use Topics    Smoking status: Never Smoker    Smokeless tobacco: Never Used    Alcohol use No       Medications/Allergies:  Current Outpatient Prescriptions on File Prior to Visit   Medication Sig Dispense Refill    simvastatin (ZOCOR) 5 mg tablet TAKE 1 TABLET NIGHTLY 90 Tab 1    metFORMIN ER (GLUCOPHAGE XR) 500 mg tablet TAKE 1 TABLET DAILY (WITH DINNER) 90 Tab 1    lisinopril (PRINIVIL, ZESTRIL) 5 mg tablet Take 1 Tab by mouth daily. 90 Tab 2    apixaban (ELIQUIS) 5 mg tablet Take 1 Tab by mouth two (2) times a day.  for DVT, Tab 2    DETROL LA 4 mg ER capsule TAKE 1 CAPSULE DAILY 90 Cap 1    VITAMIN D2 50,000 unit capsule TAKE 1 CAPSULE EVERY 7 DAYS 12 Cap 2    acetaminophen (TYLENOL) 325 mg tablet Take  by mouth every four (4) hours as needed for Pain. No current facility-administered medications on file prior to visit. No Known Allergies      Health Maintenance:   Health Maintenance   Topic Date Due    Pneumococcal 65+ Low/Medium Risk (2 of 2 - PCV13) 01/01/2016    COLONOSCOPY  06/14/2016    FOOT EXAM Q1  07/20/2017    MEDICARE YEARLY EXAM  07/21/2017    HEMOGLOBIN A1C Q6M  07/24/2017    MICROALBUMIN Q1  08/22/2017    LIPID PANEL Q1  08/22/2017    EYE EXAM RETINAL OR DILATED Q1  02/25/2018    GLAUCOMA SCREENING Q2Y  02/25/2019    DTaP/Tdap/Td series (2 - Td) 06/15/2025    OSTEOPOROSIS SCREENING (DEXA)  Completed    ZOSTER VACCINE AGE 60>  Completed    INFLUENZA AGE 9 TO ADULT  Completed       Objective:  Visit Vitals    /84 (BP 1 Location: Left arm, BP Patient Position: Sitting)    Pulse 70    Temp 98.1 °F (36.7 °C) (Oral)    Resp 22    Ht 5' 5.5\" (1.664 m)    Wt 239 lb 6.4 oz (108.6 kg)    SpO2 96%    BMI 39.23 kg/m2          Nurses notes and VS reviewed. Physical Examination: General appearance - alert, well appearing, and in no distress  Chest - clear to auscultation, no wheezes, rales or rhonchi, symmetric air entry  Heart - normal rate, regular rhythm, normal S1, S2, no murmurs, rubs, clicks or gallops  Ext:  RT leg swelling less as compared to last visit.         Labwork and Ancillary Studies:    CBC w/Diff  Lab Results   Component Value Date/Time    WBC 6.1 08/22/2016 08:34 AM    HGB 14.7 08/22/2016 08:34 AM    PLATELET 372 08/80/5336 08:34 AM         Basic Metabolic Profile  Lab Results   Component Value Date/Time    Sodium 139 08/22/2016 08:34 AM    Potassium 4.2 08/22/2016 08:34 AM    Chloride 104 08/22/2016 08:34 AM    CO2 26 08/22/2016 08:34 AM    Anion gap 9 08/22/2016 08:34 AM    Glucose 122 08/22/2016 08:34 AM    BUN 23 08/22/2016 08:34 AM    Creatinine 1.22 08/22/2016 08:34 AM    BUN/Creatinine ratio 19 08/22/2016 08:34 AM    GFR est AA 52 08/22/2016 08:34 AM    GFR est non-AA 43 08/22/2016 08:34 AM    Calcium 8.8 08/22/2016 08:34 AM         LFT  Lab Results   Component Value Date/Time    ALT (SGPT) 22 08/22/2016 08:34 AM    AST (SGOT) 18 08/22/2016 08:34 AM    Alk. phosphatase 70 08/22/2016 08:34 AM    Bilirubin, direct 0.2 08/22/2016 08:34 AM    Bilirubin, total 0.8 08/22/2016 08:34 AM         Cholesterol  Lab Results   Component Value Date/Time    Cholesterol, total 148 08/22/2016 08:34 AM    HDL Cholesterol 50 08/22/2016 08:34 AM    LDL, calculated 69.2 08/22/2016 08:34 AM    Triglyceride 144 08/22/2016 08:34 AM    CHOL/HDL Ratio 3.0 08/22/2016 08:34 AM         Advance Care Planning (ACP) Provider Conversation Snapshot    Date of ACP Conversation: 04/26/17  Persons included in Conversation:  patient  Length of ACP Conversation in minutes:  <16 minutes (Non-Billable)    Authorized Decision Maker (if patient is incapable of making informed decisions):    This person is:   Healthcare Agent/Medical Power of  under Advance Directive      Conversation Outcomes / Follow-Up Plan:   Recommended completion of Advance Directive form after review of ACP materials and conversation with prospective healthcare agent

## 2017-04-26 NOTE — PATIENT INSTRUCTIONS

## 2017-04-26 NOTE — PROGRESS NOTES
Jacky Ayala is a 78 y.o. female  Chief Complaint   Patient presents with    Hypertension     1 month follow up     1. Have you been to the ER, urgent care clinic since your last visit? Hospitalized since your last visit? No    2. Have you seen or consulted any other health care providers outside of the [address] [place] [address] since your last visit? Include any pap smears or colon screening.  No

## 2017-07-26 ENCOUNTER — TELEPHONE (OUTPATIENT)
Dept: FAMILY MEDICINE CLINIC | Age: 77
End: 2017-07-26

## 2017-07-26 ENCOUNTER — OFFICE VISIT (OUTPATIENT)
Dept: FAMILY MEDICINE CLINIC | Age: 77
End: 2017-07-26

## 2017-07-26 ENCOUNTER — HOSPITAL ENCOUNTER (OUTPATIENT)
Dept: LAB | Age: 77
Discharge: HOME | End: 2017-07-26
Payer: MEDICARE

## 2017-07-26 VITALS
BODY MASS INDEX: 38.57 KG/M2 | WEIGHT: 240 LBS | OXYGEN SATURATION: 97 % | HEART RATE: 70 BPM | DIASTOLIC BLOOD PRESSURE: 94 MMHG | RESPIRATION RATE: 18 BRPM | TEMPERATURE: 98 F | HEIGHT: 66 IN | SYSTOLIC BLOOD PRESSURE: 148 MMHG

## 2017-07-26 DIAGNOSIS — E78.00 PURE HYPERCHOLESTEROLEMIA: ICD-10-CM

## 2017-07-26 DIAGNOSIS — E11.00 TYPE 2 DIABETES MELLITUS WITH HYPEROSMOLARITY WITHOUT COMA, WITHOUT LONG-TERM CURRENT USE OF INSULIN (HCC): ICD-10-CM

## 2017-07-26 DIAGNOSIS — E11.9 TYPE 2 DIABETES MELLITUS WITHOUT COMPLICATION, WITHOUT LONG-TERM CURRENT USE OF INSULIN (HCC): ICD-10-CM

## 2017-07-26 DIAGNOSIS — I10 ESSENTIAL HYPERTENSION: ICD-10-CM

## 2017-07-26 DIAGNOSIS — E55.9 HYPOVITAMINOSIS D: ICD-10-CM

## 2017-07-26 DIAGNOSIS — I26.92 ACUTE SADDLE PULMONARY EMBOLISM WITHOUT ACUTE COR PULMONALE (HCC): Primary | ICD-10-CM

## 2017-07-26 LAB
25(OH)D3 SERPL-MCNC: 53.7 NG/ML (ref 30–100)
ALBUMIN SERPL BCP-MCNC: 3.6 G/DL (ref 3.4–5)
ALBUMIN/GLOB SERPL: 1.1 {RATIO} (ref 0.8–1.7)
ALP SERPL-CCNC: 74 U/L (ref 45–117)
ALT SERPL-CCNC: 22 U/L (ref 13–56)
ANION GAP BLD CALC-SCNC: 9 MMOL/L (ref 3–18)
AST SERPL W P-5'-P-CCNC: 17 U/L (ref 15–37)
BASOPHILS # BLD AUTO: 0 K/UL (ref 0–0.06)
BASOPHILS # BLD: 0 % (ref 0–2)
BILIRUB DIRECT SERPL-MCNC: 0.3 MG/DL (ref 0–0.2)
BILIRUB SERPL-MCNC: 1 MG/DL (ref 0.2–1)
BUN SERPL-MCNC: 23 MG/DL (ref 7–18)
BUN/CREAT SERPL: 21 (ref 12–20)
CALCIUM SERPL-MCNC: 8.6 MG/DL (ref 8.5–10.1)
CHLORIDE SERPL-SCNC: 107 MMOL/L (ref 100–108)
CHOLEST SERPL-MCNC: 136 MG/DL
CO2 SERPL-SCNC: 26 MMOL/L (ref 21–32)
CREAT SERPL-MCNC: 1.07 MG/DL (ref 0.6–1.3)
CREAT UR-MCNC: 246.2 MG/DL (ref 30–125)
DIFFERENTIAL METHOD BLD: ABNORMAL
EOSINOPHIL # BLD: 0.1 K/UL (ref 0–0.4)
EOSINOPHIL NFR BLD: 2 % (ref 0–5)
ERYTHROCYTE [DISTWIDTH] IN BLOOD BY AUTOMATED COUNT: 13.8 % (ref 11.6–14.5)
GLOBULIN SER CALC-MCNC: 3.3 G/DL (ref 2–4)
GLUCOSE SERPL-MCNC: 95 MG/DL (ref 74–99)
HBA1C MFR BLD HPLC: 5.9 %
HBA1C MFR BLD: 6.3 % (ref 4.2–5.6)
HCT VFR BLD AUTO: 44.6 % (ref 35–45)
HDLC SERPL-MCNC: 49 MG/DL (ref 40–60)
HDLC SERPL: 2.8 {RATIO} (ref 0–5)
HGB BLD-MCNC: 14.7 G/DL (ref 12–16)
LDLC SERPL CALC-MCNC: 62 MG/DL (ref 0–100)
LIPID PROFILE,FLP: NORMAL
LYMPHOCYTES # BLD AUTO: 39 % (ref 21–52)
LYMPHOCYTES # BLD: 2.2 K/UL (ref 0.9–3.6)
MCH RBC QN AUTO: 28.4 PG (ref 24–34)
MCHC RBC AUTO-ENTMCNC: 33 G/DL (ref 31–37)
MCV RBC AUTO: 86.3 FL (ref 74–97)
MICROALBUMIN UR-MCNC: 12.4 MG/DL (ref 0–3)
MICROALBUMIN/CREAT UR-RTO: 50 MG/G (ref 0–30)
MONOCYTES # BLD: 0.5 K/UL (ref 0.05–1.2)
MONOCYTES NFR BLD AUTO: 8 % (ref 3–10)
NEUTS SEG # BLD: 2.9 K/UL (ref 1.8–8)
NEUTS SEG NFR BLD AUTO: 51 % (ref 40–73)
PLATELET # BLD AUTO: 148 K/UL (ref 135–420)
PMV BLD AUTO: 11.9 FL (ref 9.2–11.8)
POTASSIUM SERPL-SCNC: 4.3 MMOL/L (ref 3.5–5.5)
PROT SERPL-MCNC: 6.9 G/DL (ref 6.4–8.2)
RBC # BLD AUTO: 5.17 M/UL (ref 4.2–5.3)
SODIUM SERPL-SCNC: 142 MMOL/L (ref 136–145)
T4 FREE SERPL-MCNC: 1 NG/DL (ref 0.7–1.5)
TRIGL SERPL-MCNC: 125 MG/DL (ref ?–150)
TSH SERPL DL<=0.05 MIU/L-ACNC: 1.61 UIU/ML (ref 0.36–3.74)
VLDLC SERPL CALC-MCNC: 25 MG/DL
WBC # BLD AUTO: 5.8 K/UL (ref 4.6–13.2)

## 2017-07-26 PROCEDURE — 82306 VITAMIN D 25 HYDROXY: CPT | Performed by: INTERNAL MEDICINE

## 2017-07-26 PROCEDURE — 84443 ASSAY THYROID STIM HORMONE: CPT | Performed by: INTERNAL MEDICINE

## 2017-07-26 PROCEDURE — 84439 ASSAY OF FREE THYROXINE: CPT | Performed by: INTERNAL MEDICINE

## 2017-07-26 PROCEDURE — 83036 HEMOGLOBIN GLYCOSYLATED A1C: CPT | Performed by: INTERNAL MEDICINE

## 2017-07-26 PROCEDURE — 36415 COLL VENOUS BLD VENIPUNCTURE: CPT | Performed by: INTERNAL MEDICINE

## 2017-07-26 PROCEDURE — 85025 COMPLETE CBC W/AUTO DIFF WBC: CPT | Performed by: INTERNAL MEDICINE

## 2017-07-26 PROCEDURE — 80048 BASIC METABOLIC PNL TOTAL CA: CPT | Performed by: INTERNAL MEDICINE

## 2017-07-26 PROCEDURE — 82043 UR ALBUMIN QUANTITATIVE: CPT | Performed by: INTERNAL MEDICINE

## 2017-07-26 PROCEDURE — 80076 HEPATIC FUNCTION PANEL: CPT | Performed by: INTERNAL MEDICINE

## 2017-07-26 PROCEDURE — 80061 LIPID PANEL: CPT | Performed by: INTERNAL MEDICINE

## 2017-07-26 NOTE — PROGRESS NOTES
Janet Matos is a 78 y.o. female here today for diabetes follow-up. 1. Have you been to the ER, urgent care clinic since your last visit? Hospitalized since your last visit? No    2. Have you seen or consulted any other health care providers outside of the [address] [place] [address] since your last visit? Include any pap smears or colon screening.  No

## 2017-07-26 NOTE — PROGRESS NOTES
Assessment/Plan:    *Diagnoses and all orders for this visit:    1. Acute saddle pulmonary embolism without acute cor pulmonale (HCC)    2. Type 2 diabetes mellitus without complication, without long-term current use of insulin ([facility]    3. Essential hypertension    4. Pure hypercholesterolemia         Will return in Aug for wellness visit. BW today. The plan was discussed with the patient. The patient verbalized understanding and is in agreement with the plan. All medication potential side effects were discussed with the patient.    -------------------------------------------------------------------------------------------------------------------        Chester Acevedo is a 78 y.o. female and presents with Diabetes         Subjective:  Pt here for f/u. Was supposed to return today for her wellness visit and she forgot to schedule it this way. So has not done her labs either. PE: doing well, swelling in the leg has finally resolved. Her SOB is better but still has some issues. She does have an appt for an echo this week. HTN: mildly elevated. Is typically lower. DM: has been controlled. Due for repeat labs. ROS:  Constitutional: No recent weight change. No weakness/fatigue. No f/c. Skin: No rashes, change in nails/hair, itching   HENT: No HA, dizziness. No hearing loss/tinnitus. No nasal congestion/discharge. Eyes: No change in vision, double/blurred vision or eye pain/redness. Cardiovascular: No CP/palpitations. No SZYMANSKI/orthopnea/PND. Respiratory: No cough/sputum, dyspnea, wheezing. Gastointestinal: No dysphagia, reflux. No n/v. No constipation/diarrhea. No melena/rectal bleeding. Genitourinary: No dysuria, urinary hesitancy, nocturia, hematuria. No incontinence. Musculoskeletal: No joint pain/stiffness. No muscle pain/tenderness. Endo: No heat/cold intolerance, no polyuria/polydypsia. Heme: No h/o anemia. No easy bleeding/bruising.    Allergy/Immunology: No seasonal rhinitis. Denies frequent colds, sinus/ear infections. Neurological: No seizures/numbness/weakness. No paresthesias. Psychiatric:  No depression, anxiety. The problem list was updated as a part of today's visit. Patient Active Problem List   Diagnosis Code    HTN (hypertension) I10    Arthritis M19.90    Left femoral vein DVT ([address] I82.412    Diabetes mellitus, type 2 ([address] E11.9    Degenerative disc disease, lumbar M51.36    HLD (hyperlipidemia) E78.5    Acute embolism and thrombosis of deep vein of right distal lower extremity ([address] I82.4Z1    Saddle embolus of pulmonary artery without acute cor pulmonale (HCC) I26.92    Aftercare following joint replacement surgery Z47.1    Knee pain M25.569    Hypoxia R09.02    Primary localized osteoarthrosis of pelvic region M16.10    Osteoarthritis of knee M17.10    Pulmonary embolism (HCC) I26.99    Shortness of breath R06.02    History of total knee replacement Z96.659       The PSH,  were reviewed. SH:  Social History   Substance Use Topics    Smoking status: Never Smoker    Smokeless tobacco: Never Used    Alcohol use No       Medications/Allergies:  Current Outpatient Prescriptions on File Prior to Visit   Medication Sig Dispense Refill    simvastatin (ZOCOR) 5 mg tablet TAKE 1 TABLET NIGHTLY 90 Tab 1    metFORMIN ER (GLUCOPHAGE XR) 500 mg tablet TAKE 1 TABLET DAILY (WITH DINNER) 90 Tab 1    lisinopril (PRINIVIL, ZESTRIL) 5 mg tablet Take 1 Tab by mouth daily. 90 Tab 2    apixaban (ELIQUIS) 5 mg tablet Take 1 Tab by mouth two (2) times a day. for DVT, Tab 2    DETROL LA 4 mg ER capsule TAKE 1 CAPSULE DAILY 90 Cap 1    VITAMIN D2 50,000 unit capsule TAKE 1 CAPSULE EVERY 7 DAYS 12 Cap 2    acetaminophen (TYLENOL) 325 mg tablet Take  by mouth every four (4) hours as needed for Pain. No current facility-administered medications on file prior to visit.          No Known Allergies      Health Maintenance:   Health Maintenance   Topic Date Due    Pneumococcal 65+ Low/Medium Risk (2 of 2 - PCV13) 01/01/2016    COLONOSCOPY  06/14/2016    FOOT EXAM Q1  07/20/2017    MEDICARE YEARLY EXAM  07/21/2017    MICROALBUMIN Q1  08/22/2017    LIPID PANEL Q1  08/22/2017    INFLUENZA AGE 9 TO ADULT  08/01/2017    HEMOGLOBIN A1C Q6M  10/26/2017    EYE EXAM RETINAL OR DILATED Q1  02/25/2018    GLAUCOMA SCREENING Q2Y  02/25/2019    DTaP/Tdap/Td series (2 - Td) 06/15/2025    OSTEOPOROSIS SCREENING (DEXA)  Completed    ZOSTER VACCINE AGE 60>  Completed       Objective:  Visit Vitals    BP (!) 148/94    Pulse 70    Temp 98 °F (36.7 °C)    Resp 18    Ht 5' 5.5\" (1.664 m)    Wt 240 lb (108.9 kg)    SpO2 97%    BMI 39.33 kg/m2          Nurses notes and VS reviewed. Physical Examination: General appearance - alert, well appearing, and in no distress  Chest - clear to auscultation, no wheezes, rales or rhonchi, symmetric air entry  Heart - normal rate, regular rhythm, normal S1, S2, no murmurs, rubs, clicks or gallops  Ext - no swelling in LE. Labwork and Ancillary Studies:    CBC w/Diff  Lab Results   Component Value Date/Time    WBC 6.1 08/22/2016 08:34 AM    HGB 14.7 08/22/2016 08:34 AM    PLATELET 546 93/30/8937 08:34 AM         Basic Metabolic Profile  Lab Results   Component Value Date/Time    Sodium 139 08/22/2016 08:34 AM    Potassium 4.2 08/22/2016 08:34 AM    Chloride 104 08/22/2016 08:34 AM    CO2 26 08/22/2016 08:34 AM    Anion gap 9 08/22/2016 08:34 AM    Glucose 122 08/22/2016 08:34 AM    BUN 23 08/22/2016 08:34 AM    Creatinine 1.22 08/22/2016 08:34 AM    BUN/Creatinine ratio 19 08/22/2016 08:34 AM    GFR est AA 52 08/22/2016 08:34 AM    GFR est non-AA 43 08/22/2016 08:34 AM    Calcium 8.8 08/22/2016 08:34 AM         LFT  Lab Results   Component Value Date/Time    ALT (SGPT) 22 08/22/2016 08:34 AM    AST (SGOT) 18 08/22/2016 08:34 AM    Alk.  phosphatase 70 08/22/2016 08:34 AM    Bilirubin, direct 0.2 08/22/2016 08:34 AM    Bilirubin, total 0.8 08/22/2016 08:34 AM         Cholesterol  Lab Results   Component Value Date/Time    Cholesterol, total 148 08/22/2016 08:34 AM    HDL Cholesterol 50 08/22/2016 08:34 AM    LDL, calculated 69.2 08/22/2016 08:34 AM    Triglyceride 144 08/22/2016 08:34 AM    CHOL/HDL Ratio 3.0 08/22/2016 08:34 AM

## 2017-07-26 NOTE — MR AVS SNAPSHOT
Visit Information Date & Time Provider Department Dept. Phone Encounter #  
 7/26/2017 10:30 AM Du Miller, Vignesh Arzola [place] [phone] [id number] Upcoming Health Maintenance Date Due Pneumococcal 65+ Low/Medium Risk (2 of 2 - PCV13) 1/1/2016 COLONOSCOPY 6/14/2016 FOOT EXAM Q1 7/20/2017 MEDICARE YEARLY EXAM 7/21/2017 MICROALBUMIN Q1 8/22/2017 LIPID PANEL Q1 8/22/2017 INFLUENZA AGE 9 TO ADULT 8/1/2017 HEMOGLOBIN A1C Q6M 10/26/2017 EYE EXAM RETINAL OR DILATED Q1 2/25/2018 GLAUCOMA SCREENING Q2Y 2/25/2019 DTaP/Tdap/Td series (2 - Td) 6/15/2025 Allergies as of 7/26/2017  Review Complete On: 7/26/2017 By: Du Miller MD  
 No Known Allergies Current Immunizations  Reviewed on 1/24/2017 Name Date Influenza High Dose Vaccine PF 9/14/2015 11:59 AM  
 Influenza Vaccine 11/1/2016 12:00 AM, 9/23/2014, 11/13/2013 Pneumococcal Polysaccharide (PPSV-23) 1/1/2015 12:00 AM, 9/23/2014  9:42 AM  
 Tdap 6/15/2015 Zoster Vaccine, Live 9/23/2012 Not reviewed this visit You Were Diagnosed With   
  
 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 
ICD-9-CM: 415.13 Type 2 diabetes mellitus without complication, without long-term current use of insulin (HCC)     ICD-10-CM: E11.9 ICD-9-CM: 250.00 Essential hypertension     ICD-10-CM: I10 
ICD-9-CM: 401.9 Pure hypercholesterolemia     ICD-10-CM: E78.00 ICD-9-CM: 272.0 Vitals BP Pulse Temp Resp Height(growth percentile) Weight(growth percentile) (!) 148/94 70 98 °F (36.7 °C) 18 5' 5.5\" (1.664 m) 240 lb (108.9 kg) SpO2 BMI OB Status Smoking Status 97% 39.33 kg/m2 Postmenopausal Never Smoker Vitals History BMI and BSA Data Body Mass Index Body Surface Area  
 39.33 kg/m 2 2.24 m 2 [phone] Your Updated Medication List  
  
   
This list is accurate as of: 7/26/17 10:42 AM.  Always use your most recent med list.  
  
  
  
  
 apixaban 5 mg tablet Commonly known as:  Kimberleea  Take 1 Tab by mouth two (2) times a day. for DVT,PE  
  
 DETROL LA 4 mg ER capsule Generic drug:  tolterodine ER  
TAKE 1 CAPSULE DAILY  
  
 lisinopril 5 mg tablet Commonly known as:  Tila Dowdy Take 1 Tab by mouth daily. metFORMIN  mg tablet Commonly known as:  GLUCOPHAGE XR  
TAKE 1 TABLET DAILY (WITH DINNER) simvastatin 5 mg tablet Commonly known as:  ZOCOR  
TAKE 1 TABLET NIGHTLY  
  
 TYLENOL 325 mg tablet Generic drug:  acetaminophen Take  by mouth every four (4) hours as needed for Pain. VITAMIN D2 50,000 unit capsule Generic drug:  ergocalciferol TAKE 1 CAPSULE EVERY 7 DAYS Patient Instructions Learning About Diabetes Food Guidelines Your Care Instructions Meal planning is important to manage diabetes. It helps keep your blood sugar at a target level (which you set with your doctor). You don't have to eat special foods. You can eat what your family eats, including sweets once in a while. But you do have to pay attention to how often you eat and how much you eat of certain foods. You may want to work with a dietitian or a certified diabetes educator (CDE) to help you plan meals and snacks. A dietitian or CDE can also help you lose weight if that is one of your goals. What should you know about eating carbs? Managing the amount of carbohydrate (carbs) you eat is an important part of healthy meals when you have diabetes. Carbohydrate is found in many foods. · Learn which foods have carbs. And learn the amounts of carbs in different foods.  
¨ Bread, cereal, pasta, and rice have about 15 grams of carbs in a serving. A serving is 1 slice of bread (1 ounce), ½ cup of cooked cereal, or 1/3 cup of cooked pasta or rice. ¨ Fruits have 15 grams of carbs in a serving. A serving is 1 small fresh fruit, such as an apple or orange; ½ of a banana; ½ cup of cooked or canned fruit; ½ cup of fruit juice; 1 cup of melon or raspberries; or 2 tablespoons of dried fruit. ¨ Milk and no-sugar-added yogurt have 15 grams of carbs in a serving. A serving is 1 cup of milk or 2/3 cup of no-sugar-added yogurt. ¨ Starchy vegetables have 15 grams of carbs in a serving. A serving is ½ cup of mashed potatoes or sweet potato; 1 cup winter squash; ½ of a small baked potato; ½ cup of cooked beans; or ½ cup cooked corn or green peas. · Learn how much carbs to eat each day and at each meal. A dietitian or CDE can teach you how to keep track of the amount of carbs you eat. This is called carbohydrate counting. · If you are not sure how to count carbohydrate grams, use the Plate Method to plan meals. It is a good, quick way to make sure that you have a balanced meal. It also helps you spread carbs throughout the day. ¨ Divide your plate by types of foods. Put non-starchy vegetables on half the plate, meat or other protein food on one-quarter of the plate, and a grain or starchy vegetable in the final quarter of the plate. To this you can add a small piece of fruit and 1 cup of milk or yogurt, depending on how many carbs you are supposed to eat at a meal. 
· Try to eat about the same amount of carbs at each meal. Do not \"save up\" your daily allowance of carbs to eat at one meal. 
· Proteins have very little or no carbs per serving. Examples of proteins are beef, chicken, turkey, fish, eggs, tofu, cheese, cottage cheese, and peanut butter. A serving size of meat is 3 ounces, which is about the size of a deck of cards.  Examples of meat substitute serving sizes (equal to 1 ounce of meat) are 1/4 cup of cottage cheese, 1 egg, 1 tablespoon of peanut butter, and ½ cup of tofu. How can you eat out and still eat healthy? · Learn to estimate the serving sizes of foods that have carbohydrate. If you measure food at home, it will be easier to estimate the amount in a serving of restaurant food. · If the meal you order has too much carbohydrate (such as potatoes, corn, or baked beans), ask to have a low-carbohydrate food instead. Ask for a salad or green vegetables. · If you use insulin, check your blood sugar before and after eating out to help you plan how much to eat in the future. · If you eat more carbohydrate at a meal than you had planned, take a walk or do other exercise. This will help lower your blood sugar. What else should you know? · Limit saturated fat, such as the fat from meat and dairy products. This is a healthy choice because people who have diabetes are at higher risk of heart disease. So choose lean cuts of meat and nonfat or low-fat dairy products. Use olive or canola oil instead of butter or shortening when cooking. · Don't skip meals. Your blood sugar may drop too low if you skip meals and take insulin or certain medicines for diabetes. · Check with your doctor before you drink alcohol. Alcohol can cause your blood sugar to drop too low. Alcohol can also cause a bad reaction if you take certain diabetes medicines. Follow-up care is a key part of your treatment and safety. Be sure to make and go to all appointments, and call your doctor if you are having problems. It's also a good idea to know your test results and keep a list of the medicines you take. Where can you learn more? Go to http://tamie-alberto.info/. Enter I763 in the search box to learn more about \"Learning About Diabetes Food Guidelines. \" Current as of: March 13, 2017 Content Version: 11.3 © 4445-9113 [employer].  Care instructions adapted under license by [employer] (which disclaims liability or warranty for this information). If you have questions about a medical condition or this instruction, always ask your healthcare professional. [address] any warranty or liability for your use of this information. Introducing [facility] & HEALTH SERVICES! Dear Orion Chau: Thank you for requesting a [employer] account. Our records indicate that you already have an active [employer] account. You can access your account anytime at [email or web address] Did you know that you can access your hospital and ER discharge instructions at any time in [employer]? You can also review all of your test results from your hospital stay or ER visit. Additional Information If you have questions, please visit the Frequently Asked Questions section of the [employer] website at [email or web address] Remember, [employer] is NOT to be used for urgent needs. For medical emergencies, dial 911. Now available from your iPhone and Android! Please provide this summary of care documentation to your next provider. Your primary care clinician is listed as Amber 51. If you have any questions after today's visit, please call [phone].

## 2017-07-26 NOTE — PATIENT INSTRUCTIONS

## 2017-07-26 NOTE — TELEPHONE ENCOUNTER
Erlinda Trujillo from the lab at [facility] called stating that the urine specimen they received for this pt was not enough to complete the UA. He states they were able to obtain the Microalbumin but the UA will need to be re collected.  Please advise

## 2017-08-23 ENCOUNTER — OFFICE VISIT (OUTPATIENT)
Dept: FAMILY MEDICINE CLINIC | Age: 77
End: 2017-08-23

## 2017-08-23 VITALS
RESPIRATION RATE: 20 BRPM | SYSTOLIC BLOOD PRESSURE: 160 MMHG | DIASTOLIC BLOOD PRESSURE: 92 MMHG | HEIGHT: 66 IN | WEIGHT: 240.6 LBS | TEMPERATURE: 97.8 F | BODY MASS INDEX: 38.67 KG/M2 | OXYGEN SATURATION: 97 % | HEART RATE: 83 BPM

## 2017-08-23 DIAGNOSIS — Z13.6 SCREENING FOR ISCHEMIC HEART DISEASE: ICD-10-CM

## 2017-08-23 DIAGNOSIS — Z79.01 LONG TERM (CURRENT) USE OF ANTICOAGULANTS: ICD-10-CM

## 2017-08-23 DIAGNOSIS — Z23 ENCOUNTER FOR IMMUNIZATION: ICD-10-CM

## 2017-08-23 DIAGNOSIS — E11.00 TYPE 2 DIABETES MELLITUS WITH HYPEROSMOLARITY WITHOUT COMA, WITHOUT LONG-TERM CURRENT USE OF INSULIN (HCC): Primary | ICD-10-CM

## 2017-08-23 DIAGNOSIS — I26.92 ACUTE SADDLE PULMONARY EMBOLISM WITHOUT ACUTE COR PULMONALE (HCC): ICD-10-CM

## 2017-08-23 DIAGNOSIS — E78.00 PURE HYPERCHOLESTEROLEMIA: ICD-10-CM

## 2017-08-23 DIAGNOSIS — I10 ESSENTIAL HYPERTENSION: ICD-10-CM

## 2017-08-23 DIAGNOSIS — Z12.11 COLON CANCER SCREENING: ICD-10-CM

## 2017-08-23 DIAGNOSIS — Z13.1 SCREENING FOR DIABETES MELLITUS: ICD-10-CM

## 2017-08-23 DIAGNOSIS — Z00.00 MEDICARE ANNUAL WELLNESS VISIT, SUBSEQUENT: ICD-10-CM

## 2017-08-23 RX ORDER — LISINOPRIL 10 MG/1
10 TABLET ORAL DAILY
Qty: 90 TAB | Refills: 1 | Status: SHIPPED | OUTPATIENT
Start: 2017-08-23 | End: 2018-05-17 | Stop reason: SDUPTHER

## 2017-08-23 NOTE — MR AVS SNAPSHOT
Visit Information Date & Time Provider Department Dept. Phone Encounter #  
 8/23/2017 10:30 AM Marcin Joseph [place]  [phone] [id number] Follow-up Instructions Return in about 4 weeks (around 9/20/2017), or if symptoms worsen or fail to improve. Upcoming Health Maintenance Date Due Pneumococcal 65+ Low/Medium Risk (2 of 2 - PCV13) 1/1/2016 COLONOSCOPY 6/14/2016 FOOT EXAM Q1 7/20/2017 MEDICARE YEARLY EXAM 7/21/2017 INFLUENZA AGE 9 TO ADULT 8/1/2017 HEMOGLOBIN A1C Q6M 1/26/2018 EYE EXAM RETINAL OR DILATED Q1 2/25/2018 MICROALBUMIN Q1 7/26/2018 LIPID PANEL Q1 7/26/2018 GLAUCOMA SCREENING Q2Y 2/25/2019 DTaP/Tdap/Td series (2 - Td) 6/15/2025 Allergies as of 8/23/2017  Review Complete On: 8/23/2017 By: Alonso Herrera MD  
 No Known Allergies Current Immunizations  Reviewed on 1/24/2017 Name Date Influenza High Dose Vaccine PF  Incomplete, 9/14/2015 11:59 AM  
 Influenza Vaccine 11/1/2016 12:00 AM, 9/23/2014, 11/13/2013 Pneumococcal Polysaccharide (PPSV-23) 1/1/2015 12:00 AM, 9/23/2014  9:42 AM  
 Tdap 6/15/2015 Zoster Vaccine, Live 9/23/2012 Not reviewed this visit You Were Diagnosed With   
  
 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 
ICD-9-CM: 415.13  Long term (current) use of anticoagulants     ICD-10-CM: Z79.01 
ICD-9-CM: V58.61   
 Colon cancer screening     ICD-10-CM: Z12.11 ICD-9-CM: V76.51 Vitals BP Pulse Temp Resp Height(growth percentile) Weight(growth percentile) (!) 160/92 83 97.8 °F (36.6 °C) 20 5' 5.5\" (1.664 m) 240 lb 9.6 oz (109.1 kg) SpO2 BMI OB Status Smoking Status 97% 39.43 kg/m2 Postmenopausal Never Smoker Vitals History BMI and BSA Data Body Mass Index Body Surface Area  
 39.43 kg/m 2 2.25 m 2 Preferred Pharmacy Pharmacy Name Phone 100 [facility]el, [facility] [phone] Your Updated Medication List  
  
   
This list is accurate as of: 8/23/17 11:22 AM.  Always use your most recent med list.  
  
  
  
  
 apixaban 5 mg tablet Commonly known as:  Denice Pillow Take 1 Tab by mouth two (2) times a day. for DVT,PE  
  
 DETROL LA 4 mg ER capsule Generic drug:  tolterodine ER  
TAKE 1 CAPSULE DAILY  
  
 lisinopril 10 mg tablet Commonly known as:  Claudia Kiki Take 1 Tab by mouth daily. metFORMIN  mg tablet Commonly known as:  GLUCOPHAGE XR  
TAKE 1 TABLET DAILY (WITH DINNER) simvastatin 5 mg tablet Commonly known as:  ZOCOR  
TAKE 1 TABLET NIGHTLY  
  
 TYLENOL 325 mg tablet Generic drug:  acetaminophen Take  by mouth every four (4) hours as needed for Pain. VITAMIN D2 50,000 unit capsule Generic drug:  ergocalciferol TAKE 1 CAPSULE EVERY 7 DAYS Prescriptions Sent to Pharmacy Refills  
 lisinopril (PRINIVIL, ZESTRIL) 10 mg tablet 1 Sig: Take 1 Tab by mouth daily. Class: Normal  
 Pharmacy: [address], [address] [place] [address] #: [phone] Route: Oral  
  
We Performed the Following ADMIN INFLUENZA VIRUS VAC [ [facility]] INFLUENZA VIRUS VACCINE, HIGH DOSE SEASONAL, PRESERVATIVE FREE [09677 CPT(R)] Follow-up Instructions Return in about 4 weeks (around 9/20/2017), or if symptoms worsen or fail to improve. To-Do List   
 08/23/2017 Lab:  OCCULT BLOOD, STOOL Patient Instructions Medicare Wellness Visit, Female The best way to live healthy is to have a healthy lifestyle by eating a well-balanced diet, exercising regularly, limiting alcohol and stopping smoking. Regular physical exams and screening tests are another way to keep healthy. Preventive exams provided by your health care provider can find health problems before they become diseases or illnesses. Preventive services including immunizations, screening tests, monitoring and exams can help you take care of your own health. All people over age 72 should have a pneumovax  and and a prevnar shot to prevent pneumonia. These are once in a lifetime unless you and your provider decide differently. All people over 65 should have a yearly flu shot and a tetanus vaccine every 10 years. A bone mass density to screen for osteoporosis or thinning of the bones should be done every 2 years after 65. Screening for diabetes mellitus with a blood sugar test should be done every year. Glaucoma is a disease of the eye due to increased ocular pressure that can lead to blindness and it should be done every year by an eye professional. 
 
Cardiovascular screening tests that check for elevated lipids (fatty part of blood) which can lead to heart disease and strokes should be done every 5 years. Colorectal screening that evaluates for blood or polyps in your colon should be done yearly as a stool test or every five years as a flexible sigmoidoscope or every 10 years as a colonoscopy up to age 76. Breast cancer screening with a mammogram is recommended biennially  for women age 54-69.  
 
Screening for cervical cancer with a pap smear and pelvic exam is recommended for women after age 72 years every 2 years up to age 79 or when the provider and patient decide to stop. If there is a history of cervical abnormalities or other increased risk for cancer then the test is recommended yearly. Hepatitis C screening is also recommended for anyone born between 80 through [address]. A shingles vaccine is also recommended once in a lifetime after age 61. Your Medicare Wellness Exam is recommended annually. Here is a list of your current Health Maintenance items with a due date: 
Health Maintenance Due Topic Date Due  Pneumococcal Vaccine (2 of 2 - PCV13) 01/01/2016  Colonoscopy  06/14/2016 [facility]  07/20/2017 [facility] Annual Well Visit  07/21/2017  Flu Vaccine  08/01/2017 High Cholesterol: Care Instructions Your Care Instructions Cholesterol is a type of fat in your blood. It is needed for many body functions, such as making new cells. Cholesterol is made by your body. It also comes from food you eat. High cholesterol means that you have too much of the fat in your blood. This raises your risk of a heart attack and stroke. LDL and HDL are part of your total cholesterol. LDL is the \"bad\" cholesterol. High LDL can raise your risk for heart disease, heart attack, and stroke. HDL is the \"good\" cholesterol. It helps clear bad cholesterol from the body. High HDL is linked with a lower risk of heart disease, heart attack, and stroke. Your cholesterol levels help your doctor find out your risk for having a heart attack or stroke. You and your doctor can talk about whether you need to lower your risk and what treatment is best for you. A heart-healthy lifestyle along with medicines can help lower your cholesterol and your risk. The way you choose to lower your risk will depend on how high your risk is for heart attack and stroke. It will also depend on how you feel about taking medicines. Follow-up care is a key part of your treatment and safety.  Be sure to make and go to all appointments, and call your doctor if you are having problems. It's also a good idea to know your test results and keep a list of the medicines you take. How can you care for yourself at home? · Eat a variety of foods every day. Good choices include fruits, vegetables, whole grains (like oatmeal), dried beans and peas, nuts and seeds, soy products (like tofu), and fat-free or low-fat dairy products. · Replace butter, margarine, and hydrogenated or partially hydrogenated oils with olive and canola oils. (Canola oil margarine without trans fat is fine.) · Replace red meat with fish, poultry, and soy protein (like tofu). · Limit processed and packaged foods like chips, crackers, and cookies. · Bake, broil, or steam foods. Don't william them. · Be physically active. Get at least 30 minutes of exercise on most days of the week. Walking is a good choice. You also may want to do other activities, such as running, swimming, cycling, or playing tennis or team sports. · Stay at a healthy weight or lose weight by making the changes in eating and physical activity listed above. Losing just a small amount of weight, even 5 to 10 pounds, can reduce your risk for having a heart attack or stroke. · Do not smoke. When should you call for help? Watch closely for changes in your health, and be sure to contact your doctor if: 
· You need help making lifestyle changes. · You have questions about your medicine. Where can you learn more? Go to http://tamie-alberto.info/. Enter Q767 in the search box to learn more about \"High Cholesterol: Care Instructions. \" Current as of: April 3, 2017 Content Version: 11.3 © 3906-3164 [employer]. Care instructions adapted under license by [employer] (which disclaims liability or warranty for this information).  If you have questions about a medical condition or this instruction, always ask your healthcare professional. [employer] disclaims any warranty or liability for your use of this information. Introducing [facility] & HEALTH SERVICES! Dear Peace Bennett: Thank you for requesting a [employer] account. Our records indicate that you already have an active [employer] account. You can access your account anytime at [email or web address] Did you know that you can access your hospital and ER discharge instructions at any time in [employer]? You can also review all of your test results from your hospital stay or ER visit. Additional Information If you have questions, please visit the Frequently Asked Questions section of the [employer] website at [email or web address] Remember, [employer] is NOT to be used for urgent needs. For medical emergencies, dial 911. Now available from your iPhone and Android! Please provide this summary of care documentation to your next provider. Your primary care clinician is listed as Amber 51. If you have any questions after today's visit, please call [phone].

## 2017-08-23 NOTE — PROGRESS NOTES
Milagros Fernandez is a 78 y.o. female here today for [address]        1. Have you been to the ER, urgent care clinic since your last visit? Hospitalized since your last visit? No    2. Have you seen or consulted any other health care providers outside of the [address] [place] [address] since your last visit? Include any pap smears or colon screening.  Yes Where: [facility]

## 2017-08-23 NOTE — PATIENT INSTRUCTIONS
Medicare Wellness Visit, Female    The best way to live healthy is to have a healthy lifestyle by eating a well-balanced diet, exercising regularly, limiting alcohol and stopping smoking. Regular physical exams and screening tests are another way to keep healthy. Preventive exams provided by your health care provider can find health problems before they become diseases or illnesses. Preventive services including immunizations, screening tests, monitoring and exams can help you take care of your own health. All people over age 72 should have a pneumovax  and and a prevnar shot to prevent pneumonia. These are once in a lifetime unless you and your provider decide differently. All people over 65 should have a yearly flu shot and a tetanus vaccine every 10 years. A bone mass density to screen for osteoporosis or thinning of the bones should be done every 2 years after 65. Screening for diabetes mellitus with a blood sugar test should be done every year. Glaucoma is a disease of the eye due to increased ocular pressure that can lead to blindness and it should be done every year by an eye professional.    Cardiovascular screening tests that check for elevated lipids (fatty part of blood) which can lead to heart disease and strokes should be done every 5 years. Colorectal screening that evaluates for blood or polyps in your colon should be done yearly as a stool test or every five years as a flexible sigmoidoscope or every 10 years as a colonoscopy up to age 76. Breast cancer screening with a mammogram is recommended biennially  for women age 54-69. Screening for cervical cancer with a pap smear and pelvic exam is recommended for women after age 72 years every 2 years up to age 79 or when the provider and patient decide to stop. If there is a history of cervical abnormalities or other increased risk for cancer then the test is recommended yearly.     Hepatitis C screening is also recommended for anyone born between [birth year] through [address]. A shingles vaccine is also recommended once in a lifetime after age 61. Your Medicare Wellness Exam is recommended annually. Here is a list of your current Health Maintenance items with a due date:  Health Maintenance Due   Topic Date Due    Pneumococcal Vaccine (2 of 2 - PCV13) 01/01/2016    Colonoscopy  06/14/2016    Diabetic Foot Care  07/20/2017    Annual Well Visit  07/21/2017    Flu Vaccine  08/01/2017            High Cholesterol: Care Instructions  Your Care Instructions  Cholesterol is a type of fat in your blood. It is needed for many body functions, such as making new cells. Cholesterol is made by your body. It also comes from food you eat. High cholesterol means that you have too much of the fat in your blood. This raises your risk of a heart attack and stroke. LDL and HDL are part of your total cholesterol. LDL is the \"bad\" cholesterol. High LDL can raise your risk for heart disease, heart attack, and stroke. HDL is the \"good\" cholesterol. It helps clear bad cholesterol from the body. High HDL is linked with a lower risk of heart disease, heart attack, and stroke. Your cholesterol levels help your doctor find out your risk for having a heart attack or stroke. You and your doctor can talk about whether you need to lower your risk and what treatment is best for you. A heart-healthy lifestyle along with medicines can help lower your cholesterol and your risk. The way you choose to lower your risk will depend on how high your risk is for heart attack and stroke. It will also depend on how you feel about taking medicines. Follow-up care is a key part of your treatment and safety. Be sure to make and go to all appointments, and call your doctor if you are having problems. It's also a good idea to know your test results and keep a list of the medicines you take. How can you care for yourself at home? · Eat a variety of foods every day.  Good choices include fruits, vegetables, whole grains (like oatmeal), dried beans and peas, nuts and seeds, soy products (like tofu), and fat-free or low-fat dairy products. · Replace butter, margarine, and hydrogenated or partially hydrogenated oils with olive and canola oils. (Canola oil margarine without trans fat is fine.)  · Replace red meat with fish, poultry, and soy protein (like tofu). · Limit processed and packaged foods like chips, crackers, and cookies. · Bake, broil, or steam foods. Don't william them. · Be physically active. Get at least 30 minutes of exercise on most days of the week. Walking is a good choice. You also may want to do other activities, such as running, swimming, cycling, or playing tennis or team sports. · Stay at a healthy weight or lose weight by making the changes in eating and physical activity listed above. Losing just a small amount of weight, even 5 to 10 pounds, can reduce your risk for having a heart attack or stroke. · Do not smoke. When should you call for help? Watch closely for changes in your health, and be sure to contact your doctor if:  · You need help making lifestyle changes. · You have questions about your medicine. Where can you learn more? Go to http://tamie-alberto.info/. Enter E820 in the search box to learn more about \"High Cholesterol: Care Instructions. \"  Current as of: April 3, 2017  Content Version: 11.3  © 0189-5751 [employer]. Care instructions adapted under license by [employer] (which disclaims liability or warranty for this information). If you have questions about a medical condition or this instruction, always ask your healthcare professional. [place] [address] any warranty or liability for your use of this information.

## 2017-08-23 NOTE — PROGRESS NOTES
Assessment/Plan:    *Diagnoses and all orders for this visit:    1. Type 2 diabetes mellitus with hyperosmolarity without coma, without long-term current use of insulin ([facility] [address]    2. Medicare annual wellness visit, subsequent    3. Screening for diabetes mellitus    4. Screening for ischemic heart disease    5. Encounter for immunization  -     Influenza Admin ()  -     High Dose Fluzone Vaccine (29797)    6. Acute saddle pulmonary embolism without acute cor pulmonale (HCC)    7. Long term (current) use of anticoagulants    8. Colon cancer screening  -     OCCULT BLOOD, STOOL; Future    9. Essential hypertension    10. Pure hypercholesterolemia    Other orders  -     lisinopril (PRINIVIL, ZESTRIL) 10 mg tablet; Take 1 Tab by mouth daily. F/u in 4 weeks for BP. Will increase Lisinopril to 10 mg daily. The plan was discussed with the patient. The patient verbalized understanding and is in agreement with the plan. All medication potential side effects were discussed with the patient.    -------------------------------------------------------------------------------------------------------------------        Krystal Tiwari is a 78 y.o. female and presents with Annual Wellness Visit         Subjective:  Pt here for f/u. HTN: NOT well controlled. DM: at goal.    HLD: at goal.    I referred her 2 years ago for colonoscopy and she still has not done this. Reminded her again. ROS:  Constitutional: No recent weight change. No weakness/fatigue. No f/c. Skin: No rashes, change in nails/hair, itching   HENT: No HA, dizziness. No hearing loss/tinnitus. No nasal congestion/discharge. Eyes: No change in vision, double/blurred vision or eye pain/redness. Cardiovascular: No CP/palpitations. No SZYMANSKI/orthopnea/PND. Respiratory: No cough/sputum, dyspnea, wheezing. Gastointestinal: No dysphagia, reflux. No n/v. No constipation/diarrhea. No melena/rectal bleeding.    Genitourinary: No dysuria, urinary hesitancy, nocturia, hematuria. No incontinence. Musculoskeletal: No joint pain/stiffness. No muscle pain/tenderness. Endo: No heat/cold intolerance, no polyuria/polydypsia. Heme: No h/o anemia. No easy bleeding/bruising. Allergy/Immunology: No seasonal rhinitis. Denies frequent colds, sinus/ear infections. Neurological: No seizures/numbness/weakness. No paresthesias. Psychiatric:  No depression, anxiety. The problem list was updated as a part of today's visit. Patient Active Problem List   Diagnosis Code    HTN (hypertension) I10    Arthritis M19.90    Left femoral vein DVT ([address] I82.412    Diabetes mellitus, type 2 ([address] E11.9    Degenerative disc disease, lumbar M51.36    HLD (hyperlipidemia) E78.5    Acute embolism and thrombosis of deep vein of right distal lower extremity ([address] I82.4Z1    Saddle embolus of pulmonary artery without acute cor pulmonale (HCC) I26.92    Aftercare following joint replacement surgery Z47.1    Knee pain M25.569    Hypoxia R09.02    Primary localized osteoarthrosis of pelvic region M16.10    Osteoarthritis of knee M17.10    Pulmonary embolism (HCC) I26.99    Shortness of breath R06.02    History of total knee replacement Z96.659    Long term (current) use of anticoagulants Z79.01       The PSH, FH were reviewed. SH:  Social History   Substance Use Topics    Smoking status: Never Smoker    Smokeless tobacco: Never Used    Alcohol use No       Medications/Allergies:  Current Outpatient Prescriptions on File Prior to Visit   Medication Sig Dispense Refill    simvastatin (ZOCOR) 5 mg tablet TAKE 1 TABLET NIGHTLY 90 Tab 1    metFORMIN ER (GLUCOPHAGE XR) 500 mg tablet TAKE 1 TABLET DAILY (WITH DINNER) 90 Tab 1    apixaban (ELIQUIS) 5 mg tablet Take 1 Tab by mouth two (2) times a day.  for DVT, Tab 2    DETROL LA 4 mg ER capsule TAKE 1 CAPSULE DAILY 90 Cap 1    VITAMIN D2 50,000 unit capsule TAKE 1 CAPSULE EVERY 7 DAYS 12 Cap 2    acetaminophen (TYLENOL) 325 mg tablet Take  by mouth every four (4) hours as needed for Pain. No current facility-administered medications on file prior to visit. No Known Allergies      Health Maintenance:   Health Maintenance   Topic Date Due    Pneumococcal 65+ Low/Medium Risk (2 of 2 - PCV13) 01/01/2016    COLONOSCOPY  06/14/2016    FOOT EXAM Q1  07/20/2017    MEDICARE YEARLY EXAM  07/21/2017    INFLUENZA AGE 9 TO ADULT  08/01/2017    HEMOGLOBIN A1C Q6M  01/26/2018    EYE EXAM RETINAL OR DILATED Q1  02/25/2018    MICROALBUMIN Q1  07/26/2018    LIPID PANEL Q1  07/26/2018    GLAUCOMA SCREENING Q2Y  02/25/2019    DTaP/Tdap/Td series (2 - Td) 06/15/2025    OSTEOPOROSIS SCREENING (DEXA)  Completed    ZOSTER VACCINE AGE 60>  Completed       Objective:  Visit Vitals    BP (!) 160/92    Pulse 83    Temp 97.8 °F (36.6 °C)    Resp 20    Ht 5' 5.5\" (1.664 m)    Wt 240 lb 9.6 oz (109.1 kg)    SpO2 97%    BMI 39.43 kg/m2          Nurses notes and VS reviewed. Physical Examination: see other note. Labwork and Ancillary Studies:    CBC w/Diff  Lab Results   Component Value Date/Time    WBC 5.8 07/26/2017 10:52 AM    HGB 14.7 07/26/2017 10:52 AM    PLATELET 461 04/21/2937 10:52 AM         Basic Metabolic Profile  Lab Results   Component Value Date/Time    Sodium 142 07/26/2017 10:52 AM    Potassium 4.3 07/26/2017 10:52 AM    Chloride 107 07/26/2017 10:52 AM    CO2 26 07/26/2017 10:52 AM    Anion gap 9 07/26/2017 10:52 AM    Glucose 95 07/26/2017 10:52 AM    BUN 23 07/26/2017 10:52 AM    Creatinine 1.07 07/26/2017 10:52 AM    BUN/Creatinine ratio 21 07/26/2017 10:52 AM    GFR est AA >60 07/26/2017 10:52 AM    GFR est non-AA 50 07/26/2017 10:52 AM    Calcium 8.6 07/26/2017 10:52 AM         LFT  Lab Results   Component Value Date/Time    ALT (SGPT) 22 07/26/2017 10:52 AM    AST (SGOT) 17 07/26/2017 10:52 AM    Alk.  phosphatase 74 07/26/2017 10:52 AM    Bilirubin, direct 0.3 07/26/2017 10:52 AM    Bilirubin, total 1.0 07/26/2017 10:52 AM         Cholesterol  Lab Results   Component Value Date/Time    Cholesterol, total 136 07/26/2017 10:52 AM    HDL Cholesterol 49 07/26/2017 10:52 AM    LDL, calculated 62 07/26/2017 10:52 AM    Triglyceride 125 07/26/2017 10:52 AM    CHOL/HDL Ratio 2.8 07/26/2017 10:52 AM

## 2017-08-23 NOTE — PROGRESS NOTES
This is a Subsequent Medicare Annual Wellness Exam (AWV) (Performed 12 months after IPPE or effective date of Medicare Part B enrollment, Once in a lifetime)    I have reviewed the patient's medical history in detail and updated the computerized patient record. History     Past Medical History:   Diagnosis Date    Arthritis     Degenerative disc disease, lumbar 7/3/2014    Diabetes mellitus, type 2 ([facility] [address] 6/26/2014    HLD (hyperlipidemia) 3/16/2015    Hypertension     IFG (impaired fasting glucose) 6/26/2014    Left femoral vein DVT ([facility] [address] 2/11/2014    Long term (current) use of anticoagulants 8/23/2017    2 DVT episodes in life    Saddle embolus of pulmonary artery without acute cor pulmonale ([facility] [address] 3/23/2017    Will remain on longterm anti-coagulation. Has had 2 DVT episodes      Past Surgical History:   Procedure Laterality Date    HX CHOLECYSTECTOMY      HX GYN      D and C    HX KNEE REPLACEMENT      HX ORTHOPAEDIC Right 2006    right knee    REV LOWER EYELID EXTEN FAT PAD       Current Outpatient Prescriptions   Medication Sig Dispense Refill    lisinopril (PRINIVIL, ZESTRIL) 10 mg tablet Take 1 Tab by mouth daily. 90 Tab 1    simvastatin (ZOCOR) 5 mg tablet TAKE 1 TABLET NIGHTLY 90 Tab 1    metFORMIN ER (GLUCOPHAGE XR) 500 mg tablet TAKE 1 TABLET DAILY (WITH DINNER) 90 Tab 1    apixaban (ELIQUIS) 5 mg tablet Take 1 Tab by mouth two (2) times a day. for DVT, Tab 2    DETROL LA 4 mg ER capsule TAKE 1 CAPSULE DAILY 90 Cap 1    VITAMIN D2 50,000 unit capsule TAKE 1 CAPSULE EVERY 7 DAYS 12 Cap 2    acetaminophen (TYLENOL) 325 mg tablet Take  by mouth every four (4) hours as needed for Pain. No Known Allergies  History reviewed. No pertinent family history.   Social History   Substance Use Topics    Smoking status: Never Smoker    Smokeless tobacco: Never Used    Alcohol use No     Patient Active Problem List   Diagnosis Code    HTN (hypertension) I10    Arthritis M19.90    Left femoral vein DVT (HCC) I82.412    Diabetes mellitus, type 2 (HCC) E11.9    Degenerative disc disease, lumbar M51.36    HLD (hyperlipidemia) E78.5    Acute embolism and thrombosis of deep vein of right distal lower extremity (HCC) I82.4Z1    Saddle embolus of pulmonary artery without acute cor pulmonale (HCC) I26.92    Aftercare following joint replacement surgery Z47.1    Knee pain M25.569    Hypoxia R09.02    Primary localized osteoarthrosis of pelvic region M16.10    Osteoarthritis of knee M17.10    Pulmonary embolism (HCC) I26.99    Shortness of breath R06.02    History of total knee replacement Z96.659    Long term (current) use of anticoagulants Z79.01       Depression Risk Factor Screening:     PHQ over the last two weeks 8/23/2017   Little interest or pleasure in doing things Not at all   Feeling down, depressed or hopeless Not at all   Total Score PHQ 2 0     Alcohol Risk Factor Screening: On any occasion during the past 3 months, have you had more than 3 drinks containing alcohol? No    Do you average more than 7 drinks per week? No      Functional Ability and Level of Safety:     Hearing Loss  Hearing is good. Activities of Daily Living  The home contains: no safety equipment  Patient does total self care    Fall Risk  Fall Risk Assessment, last 12 mths 8/23/2017   Able to walk? Yes   Fall in past 12 months?  No   Fall with injury? -   Number of falls in past 12 months -   Fall Risk Score -       Abuse Screen  Patient is not abused    Cognitive Screening   Evaluation of Cognitive Function:  Has your family/caregiver stated any concerns about your memory: no  Normal    Patient Care Team   Patient Care Team:  Capri Ambrosio MD as PCP - General (Internal Medicine)  Yi Villalba RN as Ambulatory Care Navigator    Advice/Referrals/Counseling   Education and counseling provided:  Are appropriate based on today's review and evaluation      Assessment/Plan   Diagnoses and all orders for this visit:    1. Medicare annual wellness visit, subsequent    2. Screening for diabetes mellitus    3. Screening for ischemic heart disease    4. Encounter for immunization  -     Influenza Admin ()  -     High Dose Fluzone Vaccine (59601)    5. Type 2 diabetes mellitus with hyperosmolarity without coma, without long-term current use of insulin ([facility] [address]    6. Acute saddle pulmonary embolism without acute cor pulmonale (HCC)    7. Long term (current) use of anticoagulants    Other orders  -     lisinopril (PRINIVIL, ZESTRIL) 10 mg tablet; Take 1 Tab by mouth daily.     Health Maintenance Due   Topic Date Due    Pneumococcal 65+ Low/Medium Risk (2 of 2 - PCV13) 01/01/2016    COLONOSCOPY  06/14/2016    FOOT EXAM Q1  07/20/2017    MEDICARE YEARLY EXAM  07/21/2017    INFLUENZA AGE 9 TO ADULT  08/01/2017

## 2017-09-04 ENCOUNTER — HOSPITAL ENCOUNTER (OUTPATIENT)
Dept: LAB | Age: 77
Discharge: HOME | End: 2017-09-04
Payer: MEDICARE

## 2017-09-04 DIAGNOSIS — Z12.11 COLON CANCER SCREENING: ICD-10-CM

## 2017-09-04 PROCEDURE — 82274 ASSAY TEST FOR BLOOD FECAL: CPT | Performed by: INTERNAL MEDICINE

## 2017-09-15 LAB — HEMOCCULT STL QL IA: NEGATIVE

## 2017-09-29 RX ORDER — SIMVASTATIN 5 MG/1
TABLET, FILM COATED ORAL
Qty: 90 TAB | Refills: 1 | Status: SHIPPED | OUTPATIENT
Start: 2017-09-29 | End: 2018-06-07 | Stop reason: ALTCHOICE

## 2017-10-12 DIAGNOSIS — I82.413 ACUTE DEEP VEIN THROMBOSIS (DVT) OF FEMORAL VEIN OF BOTH LOWER EXTREMITIES (HCC): ICD-10-CM

## 2017-10-12 DIAGNOSIS — I26.92 ACUTE SADDLE PULMONARY EMBOLISM WITHOUT ACUTE COR PULMONALE (HCC): ICD-10-CM

## 2017-10-17 ENCOUNTER — PATIENT OUTREACH (OUTPATIENT)
Dept: FAMILY MEDICINE CLINIC | Age: 77
End: 2017-10-17

## 2017-10-17 NOTE — Clinical Note
Patient is being transferred to [facility] for ortho surgery on knee s/p fall. Will monitor for JIMMY d/c.

## 2017-10-18 NOTE — PROGRESS NOTES
Notification of MSSP discharged from [facility] 10/13-16/2017 for S/P Fall Periprosthetic supracondylar fracture of femur     HOSPITAL COURSE     This is a 78year old female with prior hip and knee replacements who presents with a distal left femur fracture. She was followed by orthopedic surgery who advise transfer to [address] [place] [address] for surgery. She was on Eliquis prior to admission. Her last dose was on October 13. Here she had Lovenox 40 mg per day for DVT prophylaxis. She has a prior history of DVT and PE. She is transferred in stable condition, to [address] [place] [address] for orthopedic surgery. Will continue to monitor for discharge home.

## 2017-10-25 ENCOUNTER — PATIENT OUTREACH (OUTPATIENT)
Dept: FAMILY MEDICINE CLINIC | Age: 77
End: 2017-10-25

## 2017-10-25 NOTE — PROGRESS NOTES
Patient Outreach made to patient. Patient states she is now at [facility] and she will be there for awhile. I advised patient to contact our office once discharge-she should ask to speak to Ana HANKS.

## 2017-11-16 ENCOUNTER — PATIENT OUTREACH (OUTPATIENT)
Dept: FAMILY MEDICINE CLINIC | Age: 77
End: 2017-11-16

## 2017-11-16 NOTE — PROGRESS NOTES
Notification received that patient was discharged from [facility]. Also received notes that patient is at [facility] for surgery. Patient Outreached Attempted. Received patient's voicemail box. Message left requesting call back.

## 2018-05-08 ENCOUNTER — PATIENT OUTREACH (OUTPATIENT)
Dept: FAMILY MEDICINE CLINIC | Age: 78
End: 2018-05-08

## 2018-05-17 ENCOUNTER — OFFICE VISIT (OUTPATIENT)
Dept: FAMILY MEDICINE CLINIC | Age: 78
End: 2018-05-17

## 2018-05-17 VITALS
RESPIRATION RATE: 16 BRPM | TEMPERATURE: 97.7 F | SYSTOLIC BLOOD PRESSURE: 144 MMHG | HEART RATE: 78 BPM | DIASTOLIC BLOOD PRESSURE: 80 MMHG | WEIGHT: 211.8 LBS | OXYGEN SATURATION: 95 % | HEIGHT: 66 IN | BODY MASS INDEX: 34.04 KG/M2

## 2018-05-17 DIAGNOSIS — S72.302A CLOSED FRACTURE OF SHAFT OF LEFT FEMUR, UNSPECIFIED FRACTURE MORPHOLOGY, INITIAL ENCOUNTER (HCC): Primary | ICD-10-CM

## 2018-05-17 DIAGNOSIS — R26.81 GAIT INSTABILITY: ICD-10-CM

## 2018-05-17 DIAGNOSIS — R60.0 LEG EDEMA: ICD-10-CM

## 2018-05-17 DIAGNOSIS — I82.413 ACUTE DEEP VEIN THROMBOSIS (DVT) OF FEMORAL VEIN OF BOTH LOWER EXTREMITIES (HCC): ICD-10-CM

## 2018-05-17 DIAGNOSIS — E66.9 OBESITY (BMI 30-39.9): ICD-10-CM

## 2018-05-17 DIAGNOSIS — E11.00 TYPE 2 DIABETES MELLITUS WITH HYPEROSMOLARITY WITHOUT COMA, WITHOUT LONG-TERM CURRENT USE OF INSULIN (HCC): ICD-10-CM

## 2018-05-17 DIAGNOSIS — I26.92 ACUTE SADDLE PULMONARY EMBOLISM WITHOUT ACUTE COR PULMONALE (HCC): ICD-10-CM

## 2018-05-17 DIAGNOSIS — E55.9 HYPOVITAMINOSIS D: ICD-10-CM

## 2018-05-17 LAB — HBA1C MFR BLD HPLC: 5.2 %

## 2018-05-17 RX ORDER — ERGOCALCIFEROL 1.25 MG/1
CAPSULE ORAL
Qty: 12 CAP | Refills: 2 | Status: SHIPPED | OUTPATIENT
Start: 2018-05-17 | End: 2018-08-13 | Stop reason: SDUPTHER

## 2018-05-17 RX ORDER — LISINOPRIL 10 MG/1
10 TABLET ORAL DAILY
Qty: 90 TAB | Refills: 2 | Status: SHIPPED | OUTPATIENT
Start: 2018-05-17 | End: 2019-05-08 | Stop reason: SDUPTHER

## 2018-05-17 NOTE — PROGRESS NOTES
Luci Damon is a 70 y.o. female (: 1940) presenting to address:    Chief Complaint   Patient presents with   [facility] Follow Up     [facility] [place] [facility] for broken leg       Vitals:    18 1043   BP: 144/80   Pulse: 78   Resp: 16   Temp: 97.7 °F (36.5 °C)   TempSrc: Oral   SpO2: 95%   Weight: 211 lb 12.8 oz (96.1 kg)   Height: 5' 5.5\" (1.664 m)   PainSc:   0 - No pain   PainLoc: Leg       Hearing/Vision:   No exam data present    Learning Assessment:     Learning Assessment 2016   PRIMARY LEARNER Patient   HIGHEST LEVEL OF EDUCATION - PRIMARY LEARNER  2 YEARS OF COLLEGE   BARRIERS PRIMARY LEARNER NONE   CO-LEARNER CAREGIVER No   PRIMARY LANGUAGE ENGLISH   LEARNER PREFERENCE PRIMARY LISTENING   ANSWERED BY patient    RELATIONSHIP SELF     Depression Screening:     PHQ over the last two weeks 2018   Little interest or pleasure in doing things Not at all   Feeling down, depressed or hopeless Not at all   Total Score PHQ 2 0     Fall Risk Assessment:     Fall Risk Assessment, last 12 mths 2018   Able to walk? Yes   Fall in past 12 months? Yes   Fall with injury? Yes   Number of falls in past 12 months 1   Fall Risk Score 2     Abuse Screening:     Abuse Screening Questionnaire 2018   Do you ever feel afraid of your partner? N   Are you in a relationship with someone who physically or mentally threatens you? N   Is it safe for you to go home? Y     Coordination of Care Questionaire:   1. Have you been to the ER, urgent care clinic since your last visit? Hospitalized since your last visit? NO    2. Have you seen or consulted any other health care providers outside of the [address] [place] [address] since your last visit? Include any pap smears or colon screening. NO    Advanced Directive:   1. Do you have an Advanced Directive? YES    2. Would you like information on Advanced Directives?  NO

## 2018-05-17 NOTE — PROGRESS NOTES
Assessment/Plan:    *Diagnoses and all orders for this visit:    1. Closed fracture of shaft of left femur, unspecified fracture morphology, initial encounter ([address]  -     Walker (ULTRA-LIGHT ROLLATOR) misc; For daily use    2. Acute deep vein thrombosis (DVT) of femoral vein of both lower extremities (HCC)  -     apixaban (ELIQUIS) 5 mg tablet; Take 1 Tab by mouth two (2) times a day. for DVT,PE    3. Acute saddle pulmonary embolism without acute cor pulmonale (HCC)  -     apixaban (ELIQUIS) 5 mg tablet; Take 1 Tab by mouth two (2) times a day. for DVT,PE    4. Type 2 diabetes mellitus with hyperosmolarity without coma, without long-term current use of insulin (HCC)  -     AMB POC HEMOGLOBIN A1C  -     CBC WITH AUTOMATED DIFF; Future  -     HEPATIC FUNCTION PANEL; Future  -     LIPID PANEL; Future  -     METABOLIC PANEL, BASIC; Future  -     TSH 3RD GENERATION; Future  -     T4, FREE; Future  -     URINALYSIS W/ RFLX MICROSCOPIC; Future  -     HEMOGLOBIN A1C W/O EAG; Future  -     MICROALBUMIN, UR, RAND W/ MICROALB/CREAT RATIO; Future    5. Gait instability  -     Walker (ULTRA-LIGHT ROLLATOR) misc; For daily use    6. Leg edema  -     Comp Stocking,Knee,Regular,Med misc; For daily use - mild compression  Dx: R60.0    7. Hypovitaminosis D  -     VITAMIN D, 25 HYDROXY; Future    8. Obesity (BMI 30-39. 9)    Other orders  -     ergocalciferol (VITAMIN D2) 50,000 unit capsule; TAKE 1 CAPSULE EVERY 7 DAYS  -     lisinopril (PRINIVIL, ZESTRIL) 10 mg tablet; Take 1 Tab by mouth daily. mwv in Aug.  Fasting labs prior. Patient will continue to work on her weight. She has reduced quite a bit. The plan was discussed with the patient. The patient verbalized understanding and is in agreement with the plan.   All medication potential side effects were discussed with the patient.    -------------------------------------------------------------------------------------------------------------------        Marlene Vilchis Radha Arvizu is a 70 y.o. female and presents with Hospital Follow Up ([facility] for broken leg)         Subjective:  Pt here for f/u after sustaining a LT femur Fx back in Oct 2017. She was hospitalized here for a while then had to be transferred to [facility] for surgery. Her surgery went well, was then transferred to rehab in [place] and stayed there until she returned home a few weeks ago. Has had some swelling in the LT leg but wearing compression hose. DM: has been well controlled, A1c 5.2%. ROS:  Constitutional: No recent weight change. No weakness/fatigue. No f/c. Skin: No rashes, change in nails/hair, itching   HENT: No HA, dizziness. No hearing loss/tinnitus. No nasal congestion/discharge. Eyes: No change in vision, double/blurred vision or eye pain/redness. Cardiovascular: No CP/palpitations. No SZYMANSKI/orthopnea/PND. Respiratory: No cough/sputum, dyspnea, wheezing. Gastointestinal: No dysphagia, reflux. No n/v. No constipation/diarrhea. No melena/rectal bleeding. Genitourinary: No dysuria, urinary hesitancy, nocturia, hematuria. No incontinence. Musculoskeletal: No joint pain/stiffness. No muscle pain/tenderness. Endo: No heat/cold intolerance, no polyuria/polydypsia. Heme: No h/o anemia. No easy bleeding/bruising. Allergy/Immunology: No seasonal rhinitis. Denies frequent colds, sinus/ear infections. Neurological: No seizures/numbness/weakness. No paresthesias. Psychiatric:  No depression, anxiety. The problem list was updated as a part of today's visit.   Patient Active Problem List   Diagnosis Code    HTN (hypertension) I10    Arthritis M19.90    Left femoral vein DVT ([address] I82.412    Diabetes mellitus, type 2 ([address] E11.9    Degenerative disc disease, lumbar M51.36    HLD (hyperlipidemia) E78.5    Acute embolism and thrombosis of deep vein of right distal lower extremity ([address] I82.4Z1    Saddle embolus of pulmonary artery without acute cor pulmonale (HCC) I26.92    Aftercare following joint replacement surgery Z47.1    Knee pain M25.569    Hypoxia R09.02    Primary localized osteoarthrosis of pelvic region M16.10    Osteoarthritis of knee M17.10    Shortness of breath R06.02    History of total knee replacement Z96.659    Long term (current) use of anticoagulants Z79.01    Left femoral shaft fracture (HCC) S72.302A       The PSH,  were reviewed. SH:  Social History   Substance Use Topics    Smoking status: Never Smoker    Smokeless tobacco: Never Used    Alcohol use No       Medications/Allergies:  Current Outpatient Prescriptions on File Prior to Visit   Medication Sig Dispense Refill    simvastatin (ZOCOR) 5 mg tablet TAKE 1 TABLET NIGHTLY 90 Tab 1    metFORMIN ER (GLUCOPHAGE XR) 500 mg tablet TAKE 1 TABLET DAILY (WITH DINNER) 90 Tab 1    DETROL LA 4 mg ER capsule TAKE 1 CAPSULE DAILY 90 Cap 1    acetaminophen (TYLENOL) 325 mg tablet Take  by mouth every four (4) hours as needed for Pain. No current facility-administered medications on file prior to visit.          No Known Allergies      Health Maintenance:   Health Maintenance   Topic Date Due    Pneumococcal 65+ Low/Medium Risk (2 of 2 - PCV13) 01/01/2016    COLONOSCOPY  06/14/2016    FOOT EXAM Q1  07/20/2017    HEMOGLOBIN A1C Q6M  01/26/2018    EYE EXAM RETINAL OR DILATED Q1  02/25/2018    MICROALBUMIN Q1  07/26/2018    LIPID PANEL Q1  07/26/2018    Influenza Age 5 to Adult  08/01/2018    MEDICARE YEARLY EXAM  08/24/2018    GLAUCOMA SCREENING Q2Y  02/25/2019    DTaP/Tdap/Td series (2 - Td) 06/15/2025    Bone Densitometry (Dexa) Screening  Completed    ZOSTER VACCINE AGE 60>  Completed       Objective:  Visit Vitals    /80 (BP 1 Location: Right arm, BP Patient Position: Sitting)    Pulse 78    Temp 97.7 °F (36.5 °C) (Oral)    Resp 16    Ht 5' 5.5\" (1.664 m)    Wt 211 lb 12.8 oz (96.1 kg)    SpO2 95%    BMI 34.71 kg/m2          Nurses notes and VS reviewed. Physical Examination: General appearance - alert, well appearing, and in no distress  Chest - clear to auscultation, no wheezes, rales or rhonchi, symmetric air entry  Heart - normal rate, regular rhythm, normal S1, S2, no murmurs, rubs, clicks or gallops        Labwork and Ancillary Studies:    CBC w/Diff  Lab Results   Component Value Date/Time    WBC 5.8 07/26/2017 10:52 AM    HGB 14.7 07/26/2017 10:52 AM    PLATELET 259 77/27/9266 10:52 AM         Basic Metabolic Profile  Lab Results   Component Value Date/Time    Sodium 142 07/26/2017 10:52 AM    Potassium 4.3 07/26/2017 10:52 AM    Chloride 107 07/26/2017 10:52 AM    CO2 26 07/26/2017 10:52 AM    Anion gap 9 07/26/2017 10:52 AM    Glucose 95 07/26/2017 10:52 AM    BUN 23 (H) 07/26/2017 10:52 AM    Creatinine 1.07 07/26/2017 10:52 AM    BUN/Creatinine ratio 21 (H) 07/26/2017 10:52 AM    GFR est AA >60 07/26/2017 10:52 AM    GFR est non-AA 50 (L) 07/26/2017 10:52 AM    Calcium 8.6 07/26/2017 10:52 AM         LFT  Lab Results   Component Value Date/Time    ALT (SGPT) 22 07/26/2017 10:52 AM    AST (SGOT) 17 07/26/2017 10:52 AM    Alk.  phosphatase 74 07/26/2017 10:52 AM    Bilirubin, direct 0.3 (H) 07/26/2017 10:52 AM    Bilirubin, total 1.0 07/26/2017 10:52 AM         Cholesterol  Lab Results   Component Value Date/Time    Cholesterol, total 136 07/26/2017 10:52 AM    HDL Cholesterol 49 07/26/2017 10:52 AM    LDL, calculated 62 07/26/2017 10:52 AM    Triglyceride 125 07/26/2017 10:52 AM    CHOL/HDL Ratio 2.8 07/26/2017 10:52 AM

## 2018-05-17 NOTE — MR AVS SNAPSHOT
[address] [place] [address] [address] [facility] [address] [phone] Patient: Luis Campbell MRN: [id number] RCD:[id number] Visit Information Date & Time Provider Department Dept. Phone Encounter #  
 5/17/2018 10:30 AM Dell Carrasco, [address] [place] [phone] [id number] Upcoming Health Maintenance Date Due Pneumococcal 65+ Low/Medium Risk (2 of 2 - PCV13) 1/1/2016 COLONOSCOPY 6/14/2016 FOOT EXAM Q1 7/20/2017 HEMOGLOBIN A1C Q6M 1/26/2018 EYE EXAM RETINAL OR DILATED Q1 2/25/2018 MICROALBUMIN Q1 7/26/2018 LIPID PANEL Q1 7/26/2018 Influenza Age 5 to Adult 8/1/2018 MEDICARE YEARLY EXAM 8/24/2018 GLAUCOMA SCREENING Q2Y 2/25/2019 DTaP/Tdap/Td series (2 - Td) 6/15/2025 Allergies as of 5/17/2018  Review Complete On: 5/17/2018 By: Dell Carrasco MD  
 No Known Allergies Current Immunizations  Reviewed on 1/24/2017 Name Date Influenza High Dose Vaccine PF 8/23/2017, 9/14/2015 11:59 AM  
 Influenza Vaccine 11/1/2016 12:00 AM, 9/23/2014, 11/13/2013 Pneumococcal Polysaccharide (PPSV-23) 1/1/2015 12:00 AM, 9/23/2014  9:42 AM  
 Tdap 6/15/2015 Zoster Vaccine, Live 9/23/2012 Not reviewed this visit You Were Diagnosed With   
  
 Codes Comments Closed fracture of shaft of left femur, unspecified fracture morphology, initial encounter [facility]    -  Primary ICD-10-CM: A10.162P ICD-9-CM: 821.01 Acute deep vein thrombosis (DVT) of femoral vein of both lower extremities (HCC)     ICD-10-CM: S09.612 ICD-9-CM: 453.41 Acute saddle pulmonary embolism without acute cor pulmonale (HCC)     ICD-10-CM: I26.92 
ICD-9-CM: 415.13 Type 2 diabetes mellitus with hyperosmolarity without coma, without long-term current use of insulin (HCC)     ICD-10-CM: E11.00 ICD-9-CM: 250.20 Gait instability     ICD-10-CM: R26.81 
ICD-9-CM: 643. 2 Leg edema     ICD-10-CM: R60.0 ICD-9-CM: 939. 3 Vitals BP Pulse Temp Resp Height(growth percentile) Weight(growth percentile) 144/80 (BP 1 Location: Right arm, BP Patient Position: Sitting) 78 97.7 °F (36.5 °C) (Oral) 16 5' 5.5\" (1.664 m) 211 lb 12.8 oz (96.1 kg) SpO2 BMI OB Status Smoking Status 95% 34.71 kg/m2 Postmenopausal Never Smoker BMI and BSA Data Body Mass Index Body Surface Area 34.71 kg/m 2 2.11 m 2 Preferred Pharmacy Pharmacy Name Phone Monica Harris, [facility] [phone] Your Updated Medication List  
  
   
This list is accurate as of 5/17/18 11:19 AM.  Always use your most recent med list.  
  
  
  
  
 apixaban 5 mg tablet Commonly known as:  Juhi Mao Take 1 Tab by mouth two (2) times a day. for DVT,PE Comp Stocking,Knee,Regular,Med Misc For daily use - mild compression Dx: R60.0 DETROL LA 4 mg ER capsule Generic drug:  tolterodine ER  
TAKE 1 CAPSULE DAILY  
  
 ergocalciferol 50,000 unit capsule Commonly known as:  VITAMIN D2  
TAKE 1 CAPSULE EVERY 7 DAYS  
  
 lisinopril 10 mg tablet Commonly known as:  Evans Rosemarie Take 1 Tab by mouth daily. metFORMIN  mg tablet Commonly known as:  GLUCOPHAGE XR  
TAKE 1 TABLET DAILY (WITH DINNER) simvastatin 5 mg tablet Commonly known as:  ZOCOR  
TAKE 1 TABLET NIGHTLY  
  
 TYLENOL 325 mg tablet Generic drug:  acetaminophen Take  by mouth every four (4) hours as needed for Pain. Mendez Brown Commonly known as:  ULTRA-LIGHT ROLLATOR For daily use Prescriptions Printed Refills Walker (ULTRA-LIGHT ROLLATOR) misc 0 Sig: For daily use Class: Print Comp Stocking,Knee,Regular,Med misc 0 Sig: For daily use - mild compression Dx: R60.0 Class: Print Prescriptions Sent to Pharmacy  Refills  
 ergocalciferol (VITAMIN D2) 50,000 unit capsule 2  
 Sig: TAKE 1 CAPSULE EVERY 7 DAYS Class: Normal  
 [facility]: [facility] [address] [place] [address], [address] [place] [address] Ph #: [phone]  
 apixaban (ELIQUIS) 5 mg tablet 2 Sig: Take 1 Tab by mouth two (2) times a day. for DVT,PE Class: Normal  
 Pharmacy: [address], [address] [place] [address] Ph #: [phone] Route: Oral  
 lisinopril (PRINIVIL, ZESTRIL) 10 mg tablet 2 Sig: Take 1 Tab by mouth daily. Class: Normal  
 Pharmacy: [address], [address] [place] [address] Ph #: [phone] Route: Oral  
  
We Performed the Following AMB POC HEMOGLOBIN A1C [51442 CPT(R)] Patient Instructions Learning About Diabetes Food Guidelines Your Care Instructions Meal planning is important to manage diabetes. It helps keep your blood sugar at a target level (which you set with your doctor). You don't have to eat special foods. You can eat what your family eats, including sweets once in a while. But you do have to pay attention to how often you eat and how much you eat of certain foods. You may want to work with a dietitian or a certified diabetes educator (CDE) to help you plan meals and snacks. A dietitian or CDE can also help you lose weight if that is one of your goals. What should you know about eating carbs? Managing the amount of carbohydrate (carbs) you eat is an important part of healthy meals when you have diabetes. Carbohydrate is found in many foods. · Learn which foods have carbs. And learn the amounts of carbs in different foods. ¨ Bread, cereal, pasta, and rice have about 15 grams of carbs in a serving. A serving is 1 slice of bread (1 ounce), ½ cup of cooked cereal, or 1/3 cup of cooked pasta or rice. ¨ Fruits have 15 grams of carbs in a serving.  A serving is 1 small fresh fruit, such as an apple or orange; ½ of a banana; ½ cup of cooked or canned fruit; ½ cup of fruit juice; 1 cup of melon or raspberries; or 2 tablespoons of dried fruit. ¨ Milk and no-sugar-added yogurt have 15 grams of carbs in a serving. A serving is 1 cup of milk or 2/3 cup of no-sugar-added yogurt. ¨ Starchy vegetables have 15 grams of carbs in a serving. A serving is ½ cup of mashed potatoes or sweet potato; 1 cup winter squash; ½ of a small baked potato; ½ cup of cooked beans; or ½ cup cooked corn or green peas. · Learn how much carbs to eat each day and at each meal. A dietitian or CDE can teach you how to keep track of the amount of carbs you eat. This is called carbohydrate counting. · If you are not sure how to count carbohydrate grams, use the Plate Method to plan meals. It is a good, quick way to make sure that you have a balanced meal. It also helps you spread carbs throughout the day. ¨ Divide your plate by types of foods. Put non-starchy vegetables on half the plate, meat or other protein food on one-quarter of the plate, and a grain or starchy vegetable in the final quarter of the plate. To this you can add a small piece of fruit and 1 cup of milk or yogurt, depending on how many carbs you are supposed to eat at a meal. 
· Try to eat about the same amount of carbs at each meal. Do not \"save up\" your daily allowance of carbs to eat at one meal. 
· Proteins have very little or no carbs per serving. Examples of proteins are beef, chicken, turkey, fish, eggs, tofu, cheese, cottage cheese, and peanut butter. A serving size of meat is 3 ounces, which is about the size of a deck of cards. Examples of meat substitute serving sizes (equal to 1 ounce of meat) are 1/4 cup of cottage cheese, 1 egg, 1 tablespoon of peanut butter, and ½ cup of tofu. How can you eat out and still eat healthy? · Learn to estimate the serving sizes of foods that have carbohydrate. If you measure food at home, it will be easier to estimate the amount in a serving of restaurant food. · If the meal you order has too much carbohydrate (such as potatoes, corn, or baked beans), ask to have a low-carbohydrate food instead. Ask for a salad or green vegetables. · If you use insulin, check your blood sugar before and after eating out to help you plan how much to eat in the future. · If you eat more carbohydrate at a meal than you had planned, take a walk or do other exercise. This will help lower your blood sugar. What else should you know? · Limit saturated fat, such as the fat from meat and dairy products. This is a healthy choice because people who have diabetes are at higher risk of heart disease. So choose lean cuts of meat and nonfat or low-fat dairy products. Use olive or canola oil instead of butter or shortening when cooking. · Don't skip meals. Your blood sugar may drop too low if you skip meals and take insulin or certain medicines for diabetes. · Check with your doctor before you drink alcohol. Alcohol can cause your blood sugar to drop too low. Alcohol can also cause a bad reaction if you take certain diabetes medicines. Follow-up care is a key part of your treatment and safety. Be sure to make and go to all appointments, and call your doctor if you are having problems. It's also a good idea to know your test results and keep a list of the medicines you take. Where can you learn more? Go to http://tamie-alberto.info/. Enter Q074 in the search box to learn more about \"Learning About Diabetes Food Guidelines. \" Current as of: March 13, 2017 Content Version: 11.4 © 6258-7106 Healthwise, [employer]. Care instructions adapted under license by [employer] (which disclaims liability or warranty for this information). If you have questions about a medical condition or this instruction, always ask your healthcare professional. [address] any warranty or liability for your use of this information. Introducing [facility]! Dear Bonni Claude: Thank you for requesting a PT PAL account. Our records indicate that you already have an active PT PAL account. You can access your account anytime at [email or web address] Did you know that you can access your hospital and ER discharge instructions at any time in PT PAL? You can also review all of your test results from your hospital stay or ER visit. Additional Information If you have questions, please visit the Frequently Asked Questions section of the PT PAL website at [email or web address] Remember, PT PAL is NOT to be used for urgent needs. For medical emergencies, dial 911. Now available from your iPhone and Android! Please provide this summary of care documentation to your next provider. Your primary care clinician is listed as Amber 51. If you have any questions after today's visit, please call [phone].

## 2018-05-17 NOTE — PATIENT INSTRUCTIONS

## 2018-05-21 ENCOUNTER — TELEPHONE (OUTPATIENT)
Dept: FAMILY MEDICINE CLINIC | Age: 78
End: 2018-05-21

## 2018-05-22 NOTE — TELEPHONE ENCOUNTER
Pt called to check the status of the script for diabetic shoes. [facility] states we do not write scripts for that concern,pt has been made aware.

## 2018-05-22 NOTE — TELEPHONE ENCOUNTER
Patient notified by  that dr Mian Eduardo doesn't write orders for Diabetic shoes this needs to come from a podiatrist (foot doctor )

## 2018-06-01 ENCOUNTER — TELEPHONE (OUTPATIENT)
Dept: FAMILY MEDICINE CLINIC | Age: 78
End: 2018-06-01

## 2018-06-01 NOTE — TELEPHONE ENCOUNTER
Left message for jesus what are the 79 'sand 80's is this her top number or bottom bp reading what is the complete Bp reading for patient

## 2018-06-01 NOTE — TELEPHONE ENCOUNTER
Cortney Lentz from home health care called in regards to the pt stating her blood pressure has been in the high 70's and 80's. Pt physical therapist states pt has been experiencing muscle weakness in left leg which they believe is a side effect of simvastatin, and would like to know if there was another medication that could be prescribed. She also states by the end of the session has shortness of breath. Cortney Lentz also includes that pt has been taking hydrochlorothiazide 25 mg once a day that was prescribed by Dr. Oscar Marks last in 1/26/2017. Cortney Lentz advised pt to stop taking medication until it is advised to take again by PCP. Cortney Lentz would like to be contacted on concerns. Please advise.

## 2018-06-04 NOTE — TELEPHONE ENCOUNTER
Late entry: I did call Damaris Bueno from [facility], on June 1st, regarding Mrs. Jose Antonio Zheng. I had to leave a message for her to call me back regarding her BP.

## 2018-06-06 NOTE — TELEPHONE ENCOUNTER
Spoke with jesus patients Bp reading today was 103/72 and yesterday was 121/71 prior to that top number was 122-150 and 70 to 80 for bottom .  Physical therapist is wanting to know if her statin can be changed due to muscle weakness that they think is being caused by the medication

## 2018-06-07 RX ORDER — PRAVASTATIN SODIUM 10 MG/1
10 TABLET ORAL
Qty: 90 TAB | Refills: 1 | Status: SHIPPED | OUTPATIENT
Start: 2018-06-07 | End: 2018-11-26 | Stop reason: SDUPTHER

## 2018-06-07 NOTE — TELEPHONE ENCOUNTER
We can change her to Pravachol 10 mg. I will send this in. Tell her to hold the Zocor until the new med arrives.

## 2018-06-12 ENCOUNTER — DOCUMENTATION ONLY (OUTPATIENT)
Dept: FAMILY MEDICINE CLINIC | Age: 78
End: 2018-06-12

## 2018-06-12 NOTE — PROGRESS NOTES
Criss tracey  With home health PT called wanting to extended patients therapy for another 3 weeks discussed with dr Ashly Cho and she is okay with this left verbal order on voicemail asked criss to call back if she has any further questions also left fax number for her to fall order to be signed

## 2018-08-13 NOTE — TELEPHONE ENCOUNTER
Vitamin d last refilled 5/17/2018 for 12 with 2 [facility] if [facility] received this script and but there system was down . Detrol LA 3/19/17 90 with 1 refill . Last OV 5/17/2018.  next  appt 8/23/2018

## 2018-08-14 RX ORDER — ERGOCALCIFEROL 1.25 MG/1
CAPSULE ORAL
Qty: 12 CAP | Refills: 1 | Status: SHIPPED | OUTPATIENT
Start: 2018-08-14 | End: 2018-11-13 | Stop reason: SDUPTHER

## 2018-08-14 RX ORDER — TOLTERODINE 4 MG/1
CAPSULE, EXTENDED RELEASE ORAL
Qty: 90 CAP | Refills: 1 | Status: SHIPPED | OUTPATIENT
Start: 2018-08-14 | End: 2018-11-13 | Stop reason: SDUPTHER

## 2018-09-04 RX ORDER — METFORMIN HYDROCHLORIDE 500 MG/1
TABLET, EXTENDED RELEASE ORAL
Qty: 90 TAB | Refills: 0 | Status: SHIPPED | OUTPATIENT
Start: 2018-09-04 | End: 2018-11-26 | Stop reason: SDUPTHER

## 2018-09-07 ENCOUNTER — OFFICE VISIT (OUTPATIENT)
Dept: FAMILY MEDICINE CLINIC | Age: 78
End: 2018-09-07

## 2018-09-07 VITALS
BODY MASS INDEX: 33.87 KG/M2 | HEIGHT: 67 IN | RESPIRATION RATE: 12 BRPM | SYSTOLIC BLOOD PRESSURE: 126 MMHG | OXYGEN SATURATION: 94 % | TEMPERATURE: 97.5 F | WEIGHT: 215.8 LBS | DIASTOLIC BLOOD PRESSURE: 84 MMHG | HEART RATE: 84 BPM

## 2018-09-07 DIAGNOSIS — Z23 ENCOUNTER FOR IMMUNIZATION: ICD-10-CM

## 2018-09-07 DIAGNOSIS — Z12.11 COLON CANCER SCREENING: ICD-10-CM

## 2018-09-07 DIAGNOSIS — E78.00 PURE HYPERCHOLESTEROLEMIA: ICD-10-CM

## 2018-09-07 DIAGNOSIS — E55.9 HYPOVITAMINOSIS D: ICD-10-CM

## 2018-09-07 DIAGNOSIS — E11.00 TYPE 2 DIABETES MELLITUS WITH HYPEROSMOLARITY WITHOUT COMA, WITHOUT LONG-TERM CURRENT USE OF INSULIN (HCC): ICD-10-CM

## 2018-09-07 DIAGNOSIS — Z78.0 POSTMENOPAUSAL: ICD-10-CM

## 2018-09-07 DIAGNOSIS — I10 ESSENTIAL HYPERTENSION: ICD-10-CM

## 2018-09-07 DIAGNOSIS — Z00.00 MEDICARE ANNUAL WELLNESS VISIT, SUBSEQUENT: Primary | ICD-10-CM

## 2018-09-07 DIAGNOSIS — R42 DIZZINESS AND GIDDINESS: ICD-10-CM

## 2018-09-07 DIAGNOSIS — Z12.39 BREAST CANCER SCREENING: ICD-10-CM

## 2018-09-07 NOTE — PROGRESS NOTES
Grant Montgomery is a 70 y.o. female presents in office to Chief Complaint Patient presents with  Annual Wellness Visit  
  pt noticed her blood pressure increase in the past few weeks  Medication Problem Pt noticed when taking Detrol, she has to use the bathroom more frequently, within the past year Health Maintenance Due Topic Date Due  Pneumococcal 65+ Low/Medium Risk (2 of 2 - PCV13) 01/01/2016  COLONOSCOPY  06/14/2016  
 FOOT EXAM Q1  07/20/2017  
 EYE EXAM RETINAL OR DILATED Q1  02/25/2018  MICROALBUMIN Q1  07/26/2018  LIPID PANEL Q1  07/26/2018  Influenza Age 5 to Adult  08/01/2018  MEDICARE YEARLY EXAM  08/24/2018 1. Have you been to the ER, urgent care clinic since your last visit? Hospitalized since your last visit? No 
 
2. Have you seen or consulted any other health care providers outside of the [facility] since your last visit? Include any pap smears or colon screening. Yes Reason for visit: dermatology and podiatry Do you have an [address]? Yes Visit Vitals  /84  Pulse 84  Temp 97.5 °F (36.4 °C) (Oral)  Resp 12  Ht 5' 6.93\" (1.7 m)  Wt 215 lb 12.8 oz (97.9 kg)  SpO2 94%  BMI 33.87 kg/m2 Flu Immunization/s administered 9/7/2018 by Jacquelyn Forresterutant in left deltoid. Patient tolerated procedure well. No reactions noted.

## 2018-09-07 NOTE — MR AVS SNAPSHOT
[address] [place] [address] [place]  [address] [facility] [id number] [phone] Patient: Danny Ward MRN: [id number] CMB:[id number] Visit Information Date & Time Provider Department Dept. Phone Encounter #  
 9/7/2018  2:30 PM Ana Mccallum, Vignesh Abbott [phone] [id number] Upcoming Health Maintenance Date Due Pneumococcal 65+ Low/Medium Risk (2 of 2 - PCV13) 1/1/2016 COLONOSCOPY 6/14/2016 FOOT EXAM Q1 7/20/2017 EYE EXAM RETINAL OR DILATED Q1 2/25/2018 MICROALBUMIN Q1 7/26/2018 LIPID PANEL Q1 7/26/2018 Influenza Age 5 to Adult 8/1/2018 MEDICARE YEARLY EXAM 8/24/2018 HEMOGLOBIN A1C Q6M 11/17/2018 GLAUCOMA SCREENING Q2Y 2/25/2019 DTaP/Tdap/Td series (2 - Td) 6/15/2025 Allergies as of 9/7/2018  Review Complete On: 9/7/2018 By: Laisha Reyes Known Allergies Current Immunizations  Reviewed on 1/24/2017 Name Date Influenza High Dose Vaccine PF 8/23/2017, 9/14/2015 11:59 AM  
 Influenza Vaccine 11/1/2016 12:00 AM, 9/23/2014, 11/13/2013 Pneumococcal Polysaccharide (PPSV-23) 1/1/2015 12:00 AM, 9/23/2014  9:42 AM  
 Tdap 6/15/2015 Zoster Vaccine, Live 9/23/2012 Not reviewed this visit You Were Diagnosed With   
  
 Codes Comments Pure hypercholesterolemia    -  Primary ICD-10-CM: E78.00 ICD-9-CM: 272.0 Hypovitaminosis D     ICD-10-CM: E55.9 ICD-9-CM: 268.9 Type 2 diabetes mellitus with hyperosmolarity without coma, without long-term current use of insulin (HCC)     ICD-10-CM: E11.00 ICD-9-CM: 250.20 Essential hypertension     ICD-10-CM: I10 
ICD-9-CM: 401.9 Dizziness and giddiness     ICD-10-CM: K50 ICD-9-CM: 780.4 Colon cancer screening     ICD-10-CM: Z12.11 ICD-9-CM: V76.51 Medicare annual wellness visit, subsequent     ICD-10-CM: Z00.00 ICD-9-CM: V70.0 Vitals BP Pulse Temp Resp Height(growth percentile) Weight(growth percentile) 126/84 84 97.5 °F (36.4 °C) (Oral) 12 5' 6.93\" (1.7 m) 215 lb 12.8 oz (97.9 kg) SpO2 BMI OB Status Smoking Status 94% 33.87 kg/m2 Postmenopausal Never Smoker Vitals History BMI and BSA Data Body Mass Index Body Surface Area  
 33.87 kg/m 2 2.15 m 2 Preferred Pharmacy Pharmacy Name Phone Monica Harris, [facility] [phone] Your Updated Medication List  
  
   
This list is accurate as of 9/7/18  3:22 PM.  Always use your most recent med list.  
  
  
  
  
 apixaban 5 mg tablet Commonly known as:  Reno Saucer Take 1 Tab by mouth two (2) times a day. for DVT,PE Comp Stocking,Knee,Regular,Med Misc For daily use - mild compression Dx: R60.0  
  
 ergocalciferol 50,000 unit capsule Commonly known as:  VITAMIN D2  
TAKE 1 CAPSULE EVERY 7 DAYS  
  
 lisinopril 10 mg tablet Commonly known as:  Bayron Rosy Take 1 Tab by mouth daily. metFORMIN  mg tablet Commonly known as:  GLUCOPHAGE XR  
TAKE 1 TABLET DAILY (WITH DINNER) pravastatin 10 mg tablet Commonly known as:  PRAVACHOL Take 1 Tab by mouth nightly. tolterodine ER 4 mg ER capsule Commonly known as:  DETROL LA  
TAKE 1 CAPSULE DAILY  
  
 TYLENOL 325 mg tablet Generic drug:  acetaminophen Take  by mouth every four (4) hours as needed for Pain. Zandra Cummings Commonly known as:  ULTRA-LIGHT ROLLATOR For daily use We Performed the Following REFERRAL TO NEUROLOGY [LGQ68 Custom] To-Do List   
 09/07/2018 Lab:  CBC WITH AUTOMATED DIFF   
  
 09/07/2018 Lab:  COLOGUARD TEST (FECAL DNA COLORECTAL CANCER SCREENING)   
  
 09/07/2018 Lab:  HEMOGLOBIN A1C W/O EAG   
  
 09/07/2018 Lab:  HEPATIC FUNCTION PANEL   
  
 09/07/2018 Lab:  LIPID PANEL   
  
 09/07/2018   Lab:  METABOLIC PANEL, BASIC   
  
 09/07/2018 Lab:  MICROALBUMIN, UR, RAND W/ MICROALB/CREAT RATIO   
  
 09/07/2018 Lab:  T4, FREE   
  
 09/07/2018 Lab:  TSH 3RD GENERATION   
  
 09/07/2018 Lab:  URINALYSIS W/ RFLX MICROSCOPIC   
  
 09/07/2018 Lab:  VITAMIN D, 25 HYDROXY Referral Information Referral ID Referred By Referred To  
  
 [id number] Neil Butts, 14 [facility], [place]   
   [address] [place], [address] [place] [address] Phone: [phone] Fax: [phone] Visits Status Start Date End Date 1 New Request 9/7/18 9/7/19 If your referral has a status of pending review or denied, additional information will be sent to support the outcome of this decision. Patient Instructions Preventing Falls: Care Instructions Your Care Instructions Getting around your home safely can be a challenge if you have injuries or health problems that make it easy for you to fall. Loose rugs and furniture in walkways are among the dangers for many older people who have problems walking or who have poor eyesight. People who have conditions such as arthritis, osteoporosis, or dementia also have to be careful not to fall. You can make your home safer with a few simple measures. Follow-up care is a key part of your treatment and safety. Be sure to make and go to all appointments, and call your doctor if you are having problems. It's also a good idea to know your test results and keep a list of the medicines you take. How can you care for yourself at home? Taking care of yourself · You may get dizzy if you do not drink enough water. To prevent dehydration, drink plenty of fluids, enough so that your urine is light yellow or clear like water. Choose water and other caffeine-free clear liquids. If you have kidney, heart, or liver disease and have to limit fluids, talk with your doctor before you increase the amount of fluids you drink. · Exercise regularly to improve your strength, muscle tone, and balance. Walk if you can. Swimming may be a good choice if you cannot walk easily. · Have your vision and hearing checked each year or any time you notice a change. If you have trouble seeing and hearing, you might not be able to avoid objects and could lose your balance. · Know the side effects of the medicines you take. Ask your doctor or pharmacist whether the medicines you take can affect your balance. Sleeping pills or sedatives can affect your balance. · Limit the amount of alcohol you drink. Alcohol can impair your balance and other senses. · Ask your doctor whether calluses or corns on your feet need to be removed. If you wear loose-fitting shoes because of calluses or corns, you can lose your balance and fall. · Talk to your doctor if you have numbness in your feet. Preventing falls at home · Remove raised doorway thresholds, throw rugs, and clutter. Repair loose carpet or raised areas in the floor. · Move furniture and electrical cords to keep them out of walking paths. · Use nonskid floor wax, and wipe up spills right away, especially on ceramic tile floors. · If you use a walker or cane, put rubber tips on it. If you use crutches, clean the bottoms of them regularly with an abrasive pad, such as steel wool. · Keep your house well lit, especially [place], and outside walkways. Use night-lights in areas such as hallways and bathrooms. Add extra light switches or use remote switches (such as switches that go on or off when you clap your hands) to make it easier to turn lights on if you have to get up during the night. · Install sturdy handrails on stairways. · Move items in your cabinets so that the things you use a lot are on the lower shelves (about waist level). · Keep a cordless phone and a flashlight with new batteries by your bed.  If possible, put a phone in each of the main rooms of your house, or carry a cell phone in case you fall and cannot reach a phone. Or, you can wear a device around your neck or wrist. You push a button that sends a signal for help. · Wear low-heeled shoes that fit well and give your feet good support. Use footwear with nonskid soles. Check the heels and soles of your shoes for wear. Repair or replace worn heels or soles. · Do not wear socks without shoes on wood floors. · Walk on the grass when the sidewalks are slippery. If you live in an area that gets snow and ice in the winter, sprinkle salt on slippery steps and sidewalks. Preventing falls in the bath · Install grab bars and nonskid mats inside and outside your shower or tub and near the toilet and sinks. · Use shower chairs and bath benches. · Use a hand-held shower head that will allow you to sit while showering. · Get into a tub or shower by putting the weaker leg in first. Get out of a tub or shower with your strong side first. 
· Repair loose toilet seats and consider installing a raised toilet seat to make getting on and off the toilet easier. · Keep your bathroom door unlocked while you are in the shower. Where can you learn more? Go to http://tamie-alberto.info/. Enter [id number] [id number] in the search box to learn more about \"Preventing Falls: Care Instructions. \" Current as of: May 12, 2017 Content Version: 11.7 © 6553-0667 Healthwise, [facility]. Care instructions adapted under license by [employer] (which disclaims liability or warranty for this information). If you have questions about a medical condition or this instruction, always ask your healthcare professional. [place] [address] any warranty or liability for your use of this information. Medicare Wellness Visit, Female The best way to live healthy is to have a lifestyle where you eat a well-balanced diet, exercise regularly, limit alcohol use, and quit all forms of tobacco/nicotine, if applicable. Regular preventive services are another way to keep healthy. Preventive services (vaccines, screening tests, monitoring & exams) can help personalize your care plan, which helps you manage your own care. Screening tests can find health problems at the earliest stages, when they are easiest to treat. [facility] follows the current, evidence-based guidelines published by the [facility] (USPSTF) when recommending preventive services for our patients. Because we follow these guidelines, sometimes recommendations change over time as research supports it. (For example, mammograms used to be recommended annually. Even though Medicare will still pay for an annual mammogram, the newer guidelines recommend a mammogram every two years for women of average risk.) Of course, you and your doctor may decide to screen more often for some diseases, based on your risk and your health status. Preventive services for you include: - Medicare offers their members a free annual wellness visit, which is time for you and your primary care provider to discuss and plan for your preventive service needs. Take advantage of this benefit every year! 
-All adults over the age of 72 should receive the recommended pneumonia vaccines. Current USPSTF guidelines recommend a series of two vaccines for the best pneumonia protection.  
-All adults should have a flu vaccine yearly and a tetanus vaccine every 10 years. All adults age 61 and older should receive a shingles vaccine once in their lifetime.   
-A bone mass density test is recommended when a woman turns 65 to screen for osteoporosis. This test is only recommended one time, as a screening. Some providers will use this same test as a disease monitoring tool if you already have osteoporosis. -All adults age 38-68 who are overweight should have a diabetes screening test once every three years. -Other screening tests and preventive services for persons with diabetes include: an eye exam to screen for diabetic retinopathy, a kidney function test, a foot exam, and stricter control over your cholesterol.  
-Cardiovascular screening for adults with routine risk involves an electrocardiogram (ECG) at intervals determined by your doctor.  
-Colorectal cancer screenings should be done for adults age 54-65 with no increased risk factors for colorectal cancer. There are a number of acceptable methods of screening for this type of cancer. Each test has its own benefits and drawbacks. Discuss with your doctor what is most appropriate for you during your annual wellness visit. The different tests include: colonoscopy (considered the best screening method), a fecal occult blood test, a fecal DNA test, and sigmoidoscopy. -Breast cancer screenings are recommended every other year for women of normal risk, age 54-69. 
-Cervical cancer screenings for women over age 72 are only recommended with certain risk factors.  
-All adults born between [facility] should be screened once for Hepatitis C. Here is a list of your current Health Maintenance items (your personalized list of preventive services) with a due date: 
Health Maintenance Due Topic Date Due  Pneumococcal Vaccine (2 of 2 - PCV13) 01/01/2016  Colonoscopy  06/14/2016 [address] [place] [address] Diabetic Foot Care  07/20/2017 [address] [place] [address] Eye Exam  02/25/2018  Albumin Urine Test  07/26/2018  Cholesterol Test   07/26/2018  Flu Vaccine  08/01/2018 [address] [place] [address] Annual Well Visit  08/24/2018 Introducing \[facility] [place] [facility]\"" & HEALTH SERVICES! Dear Nidhi Knows: Thank you for requesting a Lymbix account. Our records indicate that you already have an active Lymbix account. You can access your account anytime at [email or web address] Did you know that you can access your hospital and ER discharge instructions at any time in Lymbix?   You can also review all of your test results from your hospital stay or ER visit. Additional Information If you have questions, please visit the Frequently Asked Questions section of the Br[employer] website at [email or web address] Remember, Bracketz is NOT to be used for urgent needs. For medical emergencies, dial 911. Now available from your iPhone and Android! Please provide this summary of care documentation to your next provider. Your primary care clinician is listed as Amber Thakur. If you have any questions after today's visit, please call [phone].

## 2018-09-07 NOTE — PATIENT INSTRUCTIONS
Preventing Falls: Care Instructions Your Care Instructions Getting around your home safely can be a challenge if you have injuries or health problems that make it easy for you to fall. Loose rugs and furniture in walkways are among the dangers for many older people who have problems walking or who have poor eyesight. People who have conditions such as arthritis, osteoporosis, or dementia also have to be careful not to fall. You can make your home safer with a few simple measures. Follow-up care is a key part of your treatment and safety. Be sure to make and go to all appointments, and call your doctor if you are having problems. It's also a good idea to know your test results and keep a list of the medicines you take. How can you care for yourself at home? Taking care of yourself · You may get dizzy if you do not drink enough water. To prevent dehydration, drink plenty of fluids, enough so that your urine is light yellow or clear like water. Choose water and other caffeine-free clear liquids. If you have kidney, heart, or liver disease and have to limit fluids, talk with your doctor before you increase the amount of fluids you drink. · Exercise regularly to improve your strength, muscle tone, and balance. Walk if you can. Swimming may be a good choice if you cannot walk easily. · Have your vision and hearing checked each year or any time you notice a change. If you have trouble seeing and hearing, you might not be able to avoid objects and could lose your balance. · Know the side effects of the medicines you take. Ask your doctor or pharmacist whether the medicines you take can affect your balance. Sleeping pills or sedatives can affect your balance. · Limit the amount of alcohol you drink. Alcohol can impair your balance and other senses. · Ask your doctor whether calluses or corns on your feet need to be removed.  If you wear loose-fitting shoes because of calluses or corns, you can lose your balance and fall. · Talk to your doctor if you have numbness in your feet. Preventing falls at home · Remove raised doorway thresholds, throw rugs, and clutter. Repair loose carpet or raised areas in the floor. · Move furniture and electrical cords to keep them out of walking paths. · Use nonskid floor wax, and wipe up spills right away, especially on ceramic tile floors. · If you use a walker or cane, put rubber tips on it. If you use crutches, clean the bottoms of them regularly with an abrasive pad, such as steel wool. · Keep your house well lit, especially Mammie Sitter, and outside walkways. Use night-lights in areas such as hallways and bathrooms. Add extra light switches or use remote switches (such as switches that go on or off when you clap your hands) to make it easier to turn lights on if you have to get up during the night. · Install sturdy handrails on stairways. · Move items in your cabinets so that the things you use a lot are on the lower shelves (about waist level). · Keep a cordless phone and a flashlight with new batteries by your bed. If possible, put a phone in each of the main rooms of your house, or carry a cell phone in case you fall and cannot reach a phone. Or, you can wear a device around your neck or wrist. You push a button that sends a signal for help. · Wear low-heeled shoes that fit well and give your feet good support. Use footwear with nonskid soles. Check the heels and soles of your shoes for wear. Repair or replace worn heels or soles. · Do not wear socks without shoes on wood floors. · Walk on the grass when the sidewalks are slippery. If you live in an area that gets snow and ice in the winter, sprinkle salt on slippery steps and sidewalks. Preventing falls in the bath · Install grab bars and nonskid mats inside and outside your shower or tub and near the toilet and sinks. · Use shower chairs and bath benches. · Use a hand-held shower head that will allow you to sit while showering. · Get into a tub or shower by putting the weaker leg in first. Get out of a tub or shower with your strong side first. 
· Repair loose toilet seats and consider installing a raised toilet seat to make getting on and off the toilet easier. · Keep your bathroom door unlocked while you are in the shower. Where can you learn more? Go to http://tamie-alberto.info/. Enter [id number] in the search box to learn more about \"Preventing Falls: Care Instructions. \" Current as of: May 12, 2017 Content Version: 11.7 © 4806-8672 [employer]. Care instructions adapted under license by [employer] (which disclaims liability or warranty for this information). If you have questions about a medical condition or this instruction, always ask your healthcare professional. [address] any warranty or liability for your use of this information. Medicare Wellness Visit, Female The best way to live healthy is to have a lifestyle where you eat a well-balanced diet, exercise regularly, limit alcohol use, and quit all forms of tobacco/nicotine, if applicable. Regular preventive services are another way to keep healthy. Preventive services (vaccines, screening tests, monitoring & exams) can help personalize your care plan, which helps you manage your own care. Screening tests can find health problems at the earliest stages, when they are easiest to treat. [facility] follows the current, evidence-based guidelines published by the [facility] (USPSTF) when recommending preventive services for our patients. Because we follow these guidelines, sometimes recommendations change over time as research supports it. (For example, mammograms used to be recommended annually.  Even though Medicare will still pay for an annual mammogram, the newer guidelines recommend a mammogram every two years for women of average risk.) Of course, you and your doctor may decide to screen more often for some diseases, based on your risk and your health status. Preventive services for you include: - Medicare offers their members a free annual wellness visit, which is time for you and your primary care provider to discuss and plan for your preventive service needs. Take advantage of this benefit every year! 
-All adults over the age of 72 should receive the recommended pneumonia vaccines. Current USPSTF guidelines recommend a series of two vaccines for the best pneumonia protection.  
-All adults should have a flu vaccine yearly and a tetanus vaccine every 10 years. All adults age 61 and older should receive a shingles vaccine once in their lifetime.   
-A bone mass density test is recommended when a woman turns 65 to screen for osteoporosis. This test is only recommended one time, as a screening. Some providers will use this same test as a disease monitoring tool if you already have osteoporosis. -All adults age 38-68 who are overweight should have a diabetes screening test once every three years.  
-Other screening tests and preventive services for persons with diabetes include: an eye exam to screen for diabetic retinopathy, a kidney function test, a foot exam, and stricter control over your cholesterol.  
-Cardiovascular screening for adults with routine risk involves an electrocardiogram (ECG) at intervals determined by your doctor.  
-Colorectal cancer screenings should be done for adults age 54-65 with no increased risk factors for colorectal cancer. There are a number of acceptable methods of screening for this type of cancer. Each test has its own benefits and drawbacks. Discuss with your doctor what is most appropriate for you during your annual wellness visit.  The different tests include: colonoscopy (considered the best screening method), a fecal occult blood test, a fecal DNA test, and sigmoidoscopy. -Breast cancer screenings are recommended every other year for women of normal risk, age 54-69. 
-Cervical cancer screenings for women over age 72 are only recommended with certain risk factors.  
-All adults born between [facility] should be screened once for Hepatitis C. Here is a list of your current Health Maintenance items (your personalized list of preventive services) with a due date: 
Health Maintenance Due Topic Date Due  Pneumococcal Vaccine (2 of 2 - PCV13) 01/01/2016  Colonoscopy  06/14/2016 [facility] Diabetic Foot Care  07/20/2017 [facility] Eye Exam  02/25/2018  Albumin Urine Test  07/26/2018  Cholesterol Test   07/26/2018  Flu Vaccine  08/01/2018 [facility] Annual Well Visit  08/24/2018

## 2018-09-07 NOTE — PROGRESS NOTES
This is the Subsequent Medicare Annual Wellness Exam, performed 12 months or more after the Initial AWV or the last Subsequent AWV I have reviewed the patient's medical history in detail and updated the computerized patient record. History Past Medical History:  
Diagnosis Date  Arthritis  Degenerative disc disease, lumbar 7/3/2014  Diabetes mellitus, type 2 ([facility] 6/26/2014  Fx femur shaft-closed ([address]  HLD (hyperlipidemia) 3/16/2015  Hypertension  IFG (impaired fasting glucose) 6/26/2014  Left femoral shaft fracture ([address] 5/17/2018  Left femoral vein DVT ([facility] 2/11/2014  Long term (current) use of anticoagulants 8/23/2017  
 2 DVT episodes in life  Saddle embolus of pulmonary artery without acute cor pulmonale ([facility] 3/23/2017 Will remain on longterm anti-coagulation. Has had 2 DVT episodes Past Surgical History:  
Procedure Laterality Date  HX CHOLECYSTECTOMY  HX FEMUR FRACTURE TX    
 HX GYN    
 D and C  
 HX KNEE REPLACEMENT    
 HX ORTHOPAEDIC Right 2006  
 right knee  REV LOWER EYELID EXTEN FAT PAD Current Outpatient Prescriptions Medication Sig Dispense Refill  metFORMIN ER (GLUCOPHAGE XR) 500 mg tablet TAKE 1 TABLET DAILY (WITH DINNER) 90 Tab 0  
 ergocalciferol (VITAMIN D2) 50,000 unit capsule TAKE 1 CAPSULE EVERY 7 DAYS 12 Cap 1  
 tolterodine ER (DETROL LA) 4 mg ER capsule TAKE 1 CAPSULE DAILY 90 Cap 1  
 pravastatin (PRAVACHOL) 10 mg tablet Take 1 Tab by mouth nightly. 90 Tab 1  
 apixaban (ELIQUIS) 5 mg tablet Take 1 Tab by mouth two (2) times a day. for DVT, Tab 2  
 lisinopril (PRINIVIL, ZESTRIL) 10 mg tablet Take 1 Tab by mouth daily. 90 Tab 2  Walker (ULTRA-LIGHT ROLLATOR) misc For daily use 1 Each 0  
 acetaminophen (TYLENOL) 325 mg tablet Take  by mouth every four (4) hours as needed for Pain.  Comp Stocking,Knee,Regular,Med misc For daily use - mild compression Dx: R60.0 2 Each 0  
 
 No Known Allergies History reviewed. No pertinent family history. Social History Substance Use Topics  Smoking status: Never Smoker  Smokeless tobacco: Never Used  Alcohol use No  
 
Patient Active Problem List  
Diagnosis Code  
 HTN (hypertension) I10  
 Arthritis M19.90  Left femoral vein DVT (HCC) I82.412  
 Diabetes mellitus, type 2 ([facility] E11.9  Degenerative disc disease, lumbar M51.36  
 HLD (hyperlipidemia) E78.5  Acute embolism and thrombosis of deep vein of right distal lower extremity (HCC) I82.4Z1  Saddle embolus of pulmonary artery without acute cor pulmonale ([facility]) I26.92  
 Aftercare following joint replacement surgery Z47.1  Knee pain M25.569  Hypoxia R09.02  
 Primary localized osteoarthrosis of pelvic region M16.10  
 Osteoarthritis of knee M17.10  Shortness of breath R06.02  
 History of total knee replacement Z96.659  Long term (current) use of anticoagulants Z79.01  Left femoral shaft fracture ([facility] S72.302A Depression Risk Factor Screening: PHQ over the last two weeks 9/7/2018 Little interest or pleasure in doing things More than half the days Feeling down, depressed, irritable, or hopeless Not at all Total Score PHQ 2 2 Alcohol Risk Factor Screening: You do not drink alcohol or very rarely. Functional Ability and Level of Safety:  
Hearing Loss Hearing is good. Activities of Daily Living The home contains: no safety equipment. Patient does total self care Fall Risk Fall Risk Assessment, last 12 mths 9/7/2018 Able to walk? Yes Fall in past 12 months? Yes Fall with injury? Yes  
Number of falls in past 12 months 3 Fall Risk Score 4 Abuse Screen Patient is not abused Cognitive Screening Evaluation of Cognitive Function: 
Has your family/caregiver stated any concerns about your memory: no 
Normal 
 
Patient Care Team  
Patient Care Team: 
Thomas Coronel MD as PCP - General (Internal Medicine) Abisai Deleon RN as Ambulatory Care Navigator Bang Huang LPN as Ambulatory Care Navigator Assessment/Plan Education and counseling provided: 
Are appropriate based on today's review and evaluation Diagnoses and all orders for this visit: 
 
1. Medicare annual wellness visit, subsequent 2. Pure hypercholesterolemia 3. Hypovitaminosis D 
-     VITAMIN D, 25 HYDROXY; Future 4. Type 2 diabetes mellitus with hyperosmolarity without coma, without long-term current use of insulin (HCC) 
-     CBC WITH AUTOMATED DIFF; Future 
-     HEPATIC FUNCTION PANEL; Future -     LIPID PANEL; Future -     METABOLIC PANEL, BASIC; Future 
-     TSH 3RD GENERATION; Future -     T4, FREE; Future -     URINALYSIS W/ RFLX MICROSCOPIC; Future 
-     HEMOGLOBIN A1C W/O EAG; Future -     MICROALBUMIN, UR, RAND W/ MICROALB/CREAT RATIO; Future 5. Essential hypertension 6. Dizziness and giddiness 
-     REFERRAL TO NEUROLOGY 7. Colon cancer screening 
-     COLOGUARD TEST (FECAL DNA COLORECTAL CANCER SCREENING); Future 8. Breast cancer screening -     JOSELITO MAMMO BI SCREENING INCL CAD; Future 9. Postmenopausal 
-     DEXA BONE DENSITY STUDY AXIAL; Future 10. Encounter for immunization -     Influenza Vaccine Inactivated (IIV)(FLUAD), Subunit, Adjuvanted, IM, (78001) -     Administration fee () for Medicare insured patients Health Maintenance Due Topic Date Due  Pneumococcal 65+ Low/Medium Risk (2 of 2 - PCV13) 01/01/2016  COLONOSCOPY  06/14/2016  
 FOOT EXAM Q1  07/20/2017  
 EYE EXAM RETINAL OR DILATED Q1  02/25/2018  MICROALBUMIN Q1  07/26/2018  LIPID PANEL Q1  07/26/2018  Influenza Age 5 to Adult  08/01/2018 Will consider PT after her visit to neuro. I would prefer that pt goes to outpt facility but her issue is that she is dependent on her daughter to drive her everywhere. Daughter works full time. Will await lab results.

## 2018-10-22 DIAGNOSIS — Z12.39 BREAST CANCER SCREENING: ICD-10-CM

## 2018-10-29 DIAGNOSIS — Z78.0 POSTMENOPAUSAL: ICD-10-CM

## 2018-11-13 NOTE — TELEPHONE ENCOUNTER
Pt is in need of prescription refill of the following medication and has a f/u appt scheduled, please advise.      Future Appointments   Date Time Provider Jadon Costa   1/7/2019  2:30 PM Germain Díaz  . [facility]

## 2018-11-15 RX ORDER — ERGOCALCIFEROL 1.25 MG/1
CAPSULE ORAL
Qty: 12 CAP | Refills: 0 | Status: SHIPPED | OUTPATIENT
Start: 2018-11-15 | End: 2019-02-19 | Stop reason: SDUPTHER

## 2018-11-15 RX ORDER — TOLTERODINE 4 MG/1
CAPSULE, EXTENDED RELEASE ORAL
Qty: 90 CAP | Refills: 0 | Status: SHIPPED | OUTPATIENT
Start: 2018-11-15 | End: 2019-01-07

## 2018-11-26 NOTE — TELEPHONE ENCOUNTER
Last OV 9/7/2018 . Next OV 1/7/2019 . Last refilled 9/4/2018 90 with 0 . Pravastatin 6/7/2018 90 with 0 refills.

## 2018-11-28 RX ORDER — METFORMIN HYDROCHLORIDE 500 MG/1
TABLET, EXTENDED RELEASE ORAL
Qty: 90 TAB | Refills: 0 | Status: SHIPPED | OUTPATIENT
Start: 2018-11-28 | End: 2019-03-04 | Stop reason: SDUPTHER

## 2018-11-28 RX ORDER — PRAVASTATIN SODIUM 10 MG/1
10 TABLET ORAL
Qty: 90 TAB | Refills: 0 | Status: SHIPPED | OUTPATIENT
Start: 2018-11-28 | End: 2019-03-04 | Stop reason: SDUPTHER

## 2019-01-07 ENCOUNTER — OFFICE VISIT (OUTPATIENT)
Dept: FAMILY MEDICINE CLINIC | Age: 79
End: 2019-01-07

## 2019-01-07 VITALS
OXYGEN SATURATION: 93 % | TEMPERATURE: 97.9 F | HEIGHT: 67 IN | HEART RATE: 82 BPM | SYSTOLIC BLOOD PRESSURE: 132 MMHG | DIASTOLIC BLOOD PRESSURE: 62 MMHG | RESPIRATION RATE: 22 BRPM | WEIGHT: 229 LBS | BODY MASS INDEX: 35.94 KG/M2

## 2019-01-07 DIAGNOSIS — E78.00 PURE HYPERCHOLESTEROLEMIA: ICD-10-CM

## 2019-01-07 DIAGNOSIS — N32.81 OVERACTIVE BLADDER: ICD-10-CM

## 2019-01-07 DIAGNOSIS — I10 ESSENTIAL HYPERTENSION: ICD-10-CM

## 2019-01-07 DIAGNOSIS — E11.00 TYPE 2 DIABETES MELLITUS WITH HYPEROSMOLARITY WITHOUT COMA, WITHOUT LONG-TERM CURRENT USE OF INSULIN (HCC): Primary | ICD-10-CM

## 2019-01-07 LAB
ALBUMIN UR QL STRIP: 10 MG/L
CREATININE, URINE POC: 300 MG/DL
HBA1C MFR BLD HPLC: 5.5 %
MICROALBUMIN/CREAT RATIO POC: <30 MG/G

## 2019-01-07 RX ORDER — SOLIFENACIN SUCCINATE 10 MG/1
10 TABLET, FILM COATED ORAL DAILY
Qty: 90 TAB | Refills: 0 | Status: SHIPPED | OUTPATIENT
Start: 2019-01-07 | End: 2019-04-15 | Stop reason: SDUPTHER

## 2019-01-07 NOTE — PROGRESS NOTES
Assessment/Plan: *Diagnoses and all orders for this visit: 
 
1. Type 2 diabetes mellitus with hyperosmolarity without coma, without long-term current use of insulin ([facility] -     AMB POC HEMOGLOBIN A1C 
-     AMB POC URINE, MICROALBUMIN, SEMIQUANT (3 RESULTS) 2. Overactive bladder 
-     solifenacin (VESICARE) 10 mg tablet; Take 1 Tab by mouth daily. 3. Essential hypertension 4. Pure hypercholesterolemia Will try her on Vesicare. D/c Detrol. Routine f/u 4 months. Will do other labs outside of here, on the weekend this week. The plan was discussed with the patient. The patient verbalized understanding and is in agreement with the plan. All medication potential side effects were discussed with the patient. 
 
------------------------------------------------------------------------------------------------------------------- Bren [place] is a 70 y.o. female and presents with Hypertension Subjective: 
Pt here for f/u. DM: very well controlled with A1c today at 5.5%. HTN:  At goal. 
 
HLD: needs to be repeated. Pt is trying to maintain a healthy diet. Continues to have overactive bladder and Detrol does not help her. ROS: 
Review of Systems - Negative except as above. The problem list was updated as a part of today's visit. Patient Active Problem List  
Diagnosis Code  
 HTN (hypertension) I10  
 Arthritis M19.90  Left femoral vein DVT (HCC) I82.412  
 Diabetes mellitus, type 2 ([address] E11.9  Degenerative disc disease, lumbar M51.36  
 HLD (hyperlipidemia) E78.5  Acute embolism and thrombosis of deep vein of right distal lower extremity (HCC) I82.4Z1  Saddle embolus of pulmonary artery without acute cor pulmonale (HCC) I26.92  
 Aftercare following joint replacement surgery Z47.1  Knee pain M25.569  Hypoxia R09.02  
 Primary localized osteoarthrosis of pelvic region M16.10  
 Osteoarthritis of knee M17.10  Shortness of breath R06.02  
 History of total knee replacement Z96.659  Long term (current) use of anticoagulants Z79.01  Left femoral shaft fracture ([address] S72.302A The PSH,  were reviewed. SH: Social History Tobacco Use  Smoking status: Never Smoker  Smokeless tobacco: Never Used Substance Use Topics  Alcohol use: No  
 Drug use: No  
 
 
Medications/Allergies: 
Current Outpatient Medications on File Prior to Visit Medication Sig Dispense Refill  metFORMIN ER (GLUCOPHAGE XR) 500 mg tablet TAKE 1 TABLET DAILY (WITH DINNER) 90 Tab 0  pravastatin (PRAVACHOL) 10 mg tablet Take 1 Tab by mouth nightly. 90 Tab 0  
 ergocalciferol (VITAMIN D2) 50,000 unit capsule TAKE 1 CAPSULE EVERY 7 DAYS 12 Cap 0  
 apixaban (ELIQUIS) 5 mg tablet Take 1 Tab by mouth two (2) times a day. for DVT, Tab 2  
 lisinopril (PRINIVIL, ZESTRIL) 10 mg tablet Take 1 Tab by mouth daily. 90 Tab 2  Walker (ULTRA-LIGHT ROLLATOR) misc For daily use 1 Each 0  
 acetaminophen (TYLENOL) 325 mg tablet Take  by mouth every four (4) hours as needed for Pain.  Comp Stocking,Knee,Regular,Med misc For daily use - mild compression Dx: R60.0 2 Each 0 No current facility-administered medications on file prior to visit. No Known Allergies Health Maintenance:  
Health Maintenance Topic Date Due  Shingrix Vaccine Age 50> (1 of 2) 05/06/1992  Pneumococcal 65+ Low/Medium Risk (2 of 2 - PCV13) 01/01/2016  
 FOOT EXAM Q1  07/20/2017  MICROALBUMIN Q1  07/26/2018  LIPID PANEL Q1  07/26/2018  HEMOGLOBIN A1C Q6M  11/17/2018  GLAUCOMA SCREENING Q2Y  02/25/2019  
 EYE EXAM RETINAL OR DILATED  02/25/2019  MEDICARE YEARLY EXAM  09/08/2019  BREAST CANCER SCRN MAMMOGRAM  10/11/2019  Cologuard Q 3 Years  10/10/2021  
 DTaP/Tdap/Td series (2 - Td) 06/15/2025  Bone Densitometry (Dexa) Screening  Completed  Influenza Age 5 to Adult  Completed Objective: Visit Vitals /62 (BP 1 Location: Left arm, BP Patient Position: Sitting) Pulse 82 Temp 97.9 °F (36.6 °C) (Oral) Resp 22 Ht 5' 6.93\" (1.7 m) Wt 229 lb (103.9 kg) SpO2 93% BMI 35.94 kg/m² Nurses notes and VS reviewed. Physical Examination: General appearance - alert, well appearing, and in no distress Chest - clear to auscultation, no wheezes, rales or rhonchi, symmetric air entry Heart - normal rate, regular rhythm, normal S1, S2, no murmurs, rubs, clicks or gallops Abdomen - soft, nontender, nondistended, no masses or organomegaly Labwork and Ancillary Studies: CBC w/Diff Lab Results Component Value Date/Time WBC 5.8 07/26/2017 10:52 AM  
 HGB 14.7 07/26/2017 10:52 AM  
 PLATELET 982 80/33/0638 10:52 AM  
  
 
 Basic Metabolic Profile Lab Results Component Value Date/Time Sodium 142 07/26/2017 10:52 AM  
 Potassium 4.3 07/26/2017 10:52 AM  
 Chloride 107 07/26/2017 10:52 AM  
 CO2 26 07/26/2017 10:52 AM  
 Anion gap 9 07/26/2017 10:52 AM  
 Glucose 95 07/26/2017 10:52 AM  
 BUN 23 (H) 07/26/2017 10:52 AM  
 Creatinine 1.07 07/26/2017 10:52 AM  
 BUN/Creatinine ratio 21 (H) 07/26/2017 10:52 AM  
 GFR est AA >60 07/26/2017 10:52 AM  
 GFR est non-AA 50 (L) 07/26/2017 10:52 AM  
 Calcium 8.6 07/26/2017 10:52 AM  
  
  
LFT Lab Results Component Value Date/Time ALT (SGPT) 22 07/26/2017 10:52 AM  
 AST (SGOT) 17 07/26/2017 10:52 AM  
 Alk. phosphatase 74 07/26/2017 10:52 AM  
 Bilirubin, direct 0.3 (H) 07/26/2017 10:52 AM  
 Bilirubin, total 1.0 07/26/2017 10:52 AM  
 
 
 
Cholesterol Lab Results Component Value Date/Time  Cholesterol, total 136 07/26/2017 10:52 AM  
 HDL Cholesterol 49 07/26/2017 10:52 AM  
 LDL, calculated 62 07/26/2017 10:52 AM  
 Triglyceride 125 07/26/2017 10:52 AM  
 CHOL/HDL Ratio 2.8 07/26/2017 10:52 AM

## 2019-01-07 NOTE — PATIENT INSTRUCTIONS

## 2019-01-07 NOTE — PROGRESS NOTES
Pt accompanied by family member Johnna. Pt was seen by ortho in [place], MRI ordered, concerns for facial droop. Neuro appt 2019 Bridgett Ballesteros is a 70 y.o. female (: 1940) presenting to address: Chief Complaint Patient presents with  Hypertension Vitals:  
 19 1422 BP: 132/62 Pulse: 82 Resp: 22 Temp: 97.9 °F (36.6 °C) TempSrc: Oral  
SpO2: 93% Weight: 229 lb (103.9 kg) Height: 5' 6.93\" (1.7 m) PainSc:   0 - No pain Hearing/Vision: No exam data present Learning Assessment:  
 
Learning Assessment 2016 PRIMARY LEARNER Patient HIGHEST LEVEL OF EDUCATION - PRIMARY LEARNER  2 YEARS OF COLLEGE  
BARRIERS PRIMARY LEARNER NONE  
CO-LEARNER CAREGIVER No  
PRIMARY LANGUAGE ENGLISH  
LEARNER PREFERENCE PRIMARY LISTENING  
ANSWERED BY patient RELATIONSHIP SELF Depression Screening: PHQ over the last two weeks 2019 Little interest or pleasure in doing things Not at all Feeling down, depressed, irritable, or hopeless Not at all Total Score PHQ 2 0 Fall Risk Assessment:  
 
Fall Risk Assessment, last 12 mths 2019 Able to walk? Yes Fall in past 12 months? No  
Fall with injury? -  
Number of falls in past 12 months - Fall Risk Score -  
 
Abuse Screening:  
 
Abuse Screening Questionnaire 2018 Do you ever feel afraid of your partner? Pavithra Macedo Are you in a relationship with someone who physically or mentally threatens you? Pavithra Macedo Is it safe for you to go home? Charles Lundberg Coordination of Care Questionaire: 1. Have you been to the ER, urgent care clinic since your last visit? Hospitalized since your last visit? No  
 
2. Have you seen or consulted any other health care providers outside of the [address] [place] [address] since your last visit? Include any pap smears or colon screening  Yes  Ortho Dr. Italia Wallis 2018 and neuro 2019 Advanced Directive: 1. Do you have an Advanced Directive? On file 2. Would you like information on Advanced Directives? No  
 
 
Health Maintenance Due Topic Date Due  Shingrix Vaccine Age 50> (1 of 2) 05/06/1992  Pneumococcal 65+ Low/Medium Risk (2 of 2 - PCV13) 01/01/2016  
 FOOT EXAM Q1  07/20/2017  MICROALBUMIN Q1  07/26/2018  LIPID PANEL Q1  07/26/2018  HEMOGLOBIN A1C Q6M  11/17/2018  GLAUCOMA SCREENING Q2Y  02/25/2019

## 2019-01-12 LAB
A-G RATIO,AGRAT: 1.2 RATIO (ref 1.1–2.6)
ABSOLUTE LYMPHOCYTE COUNT, 10803: 2.2 K/UL (ref 1–4.8)
ALBUMIN SERPL-MCNC: 4 G/DL (ref 3.5–5)
ALP SERPL-CCNC: 95 U/L (ref 40–120)
ALT SERPL-CCNC: 10 U/L (ref 5–40)
ANION GAP SERPL CALC-SCNC: 12.2 MMOL/L
AST SERPL W P-5'-P-CCNC: 14 U/L (ref 10–37)
BASOPHILS # BLD: 0 K/UL (ref 0–0.2)
BASOPHILS NFR BLD: 1 % (ref 0–2)
BILIRUB SERPL-MCNC: 0.9 MG/DL (ref 0.2–1.2)
BILIRUB UR QL: NEGATIVE
BILIRUBIN, DIRECT,CBIL: <0.2 MG/DL (ref 0–0.3)
BUN SERPL-MCNC: 22 MG/DL (ref 6–22)
CALCIUM SERPL-MCNC: 9.5 MG/DL (ref 8.4–10.5)
CHLORIDE SERPL-SCNC: 102 MMOL/L (ref 98–110)
CHOLEST SERPL-MCNC: 149 MG/DL (ref 110–200)
CO2 SERPL-SCNC: 26 MMOL/L (ref 20–32)
CREAT SERPL-MCNC: 0.9 MG/DL (ref 0.8–1.4)
EOSINOPHIL # BLD: 0.1 K/UL (ref 0–0.5)
EOSINOPHIL NFR BLD: 2 % (ref 0–6)
EPITHELIAL,EPSU: ABNORMAL /HPF (ref 0–2)
ERYTHROCYTE [DISTWIDTH] IN BLOOD BY AUTOMATED COUNT: 13.1 % (ref 10–15.5)
GFRAA, 66117: >60
GFRNA, 66118: 57.2
GLOBULIN,GLOB: 3.4 G/DL (ref 2–4)
GLUCOSE SERPL-MCNC: 97 MG/DL (ref 70–99)
GLUCOSE UR QL: NEGATIVE MG/DL
GRANULOCYTES,GRANS: 52 % (ref 40–75)
HCT VFR BLD AUTO: 47.4 % (ref 35.1–48.3)
HDLC SERPL-MCNC: 2.7 MG/DL (ref 0–5)
HDLC SERPL-MCNC: 56 MG/DL (ref 40–59)
HGB BLD-MCNC: 15.6 G/DL (ref 11.7–16.1)
HGB UR QL STRIP: NEGATIVE
KETONES UR QL STRIP.AUTO: NEGATIVE MG/DL
LDLC SERPL CALC-MCNC: 71 MG/DL (ref 50–99)
LEUKOCYTE ESTERASE: ABNORMAL
LYMPHOCYTES, LYMLT: 39 % (ref 20–45)
MCH RBC QN AUTO: 28 PG (ref 26–34)
MCHC RBC AUTO-ENTMCNC: 33 G/DL (ref 31–36)
MCV RBC AUTO: 86 FL (ref 80–95)
MONOCYTES # BLD: 0.4 K/UL (ref 0.1–1)
MONOCYTES NFR BLD: 7 % (ref 3–12)
NEUTROPHILS # BLD AUTO: 2.9 K/UL (ref 1.8–7.7)
NITRITE UR QL STRIP.AUTO: NEGATIVE
PH UR STRIP: 5 PH (ref 5–8)
PLATELET # BLD AUTO: 116 K/UL (ref 140–440)
PMV BLD AUTO: 11.5 FL (ref 9–13)
POTASSIUM SERPL-SCNC: 4.2 MMOL/L (ref 3.5–5.5)
PROT SERPL-MCNC: 7.4 G/DL (ref 6.2–8.1)
PROT UR QL STRIP: NEGATIVE MG/DL
RBC # BLD AUTO: 5.49 M/UL (ref 3.8–5.2)
RBC #/AREA URNS HPF: ABNORMAL /HPF
SODIUM SERPL-SCNC: 140 MMOL/L (ref 133–145)
SP GR UR: 1.02 (ref 1–1.03)
T4 FREE SERPL-MCNC: 1.2 NG/DL (ref 0.9–1.8)
TRIGL SERPL-MCNC: 109 MG/DL (ref 40–149)
TSH SERPL DL<=0.005 MIU/L-ACNC: 2.13 MCU/ML (ref 0.27–4.2)
UROBILINOGEN UR STRIP-MCNC: <0.2 MG/DL
VLDLC SERPL CALC-MCNC: 22 MG/DL (ref 8–30)
WBC # BLD AUTO: 5.6 K/UL (ref 4–11)
WBC URNS QL MICRO: ABNORMAL /HPF (ref 0–2)

## 2019-02-19 RX ORDER — ERGOCALCIFEROL 1.25 MG/1
CAPSULE ORAL
Qty: 12 CAP | Refills: 0 | Status: SHIPPED | OUTPATIENT
Start: 2019-02-19 | End: 2019-05-08 | Stop reason: SDUPTHER

## 2019-03-04 RX ORDER — METFORMIN HYDROCHLORIDE 500 MG/1
TABLET, EXTENDED RELEASE ORAL
Qty: 90 TAB | Refills: 0 | Status: SHIPPED | OUTPATIENT
Start: 2019-03-04 | End: 2019-06-10 | Stop reason: SDUPTHER

## 2019-03-04 RX ORDER — PRAVASTATIN SODIUM 10 MG/1
10 TABLET ORAL
Qty: 90 TAB | Refills: 0 | Status: SHIPPED | OUTPATIENT
Start: 2019-03-04 | End: 2019-06-10 | Stop reason: SDUPTHER

## 2019-03-04 NOTE — TELEPHONE ENCOUNTER
Requested Prescriptions     Pending Prescriptions Disp Refills    pravastatin (PRAVACHOL) 10 mg tablet 90 Tab 0     Sig: Take 1 Tab by mouth nightly.  metFORMIN ER (GLUCOPHAGE XR) 500 mg tablet 90 Tab 0     Sig: TAKE 1 TABLET DAILY (WITH DINNER)     Patient calling to request refill on: pravastatin, metformin  Remaining pills: a weeks worth  Last appt: 1//7/19  Next appt: 5/8/19  [facility]  Patient aware of 72 hour time frame.

## 2019-04-15 DIAGNOSIS — N32.81 OVERACTIVE BLADDER: ICD-10-CM

## 2019-04-15 NOTE — TELEPHONE ENCOUNTER
Requested Prescriptions     Pending Prescriptions Disp Refills    solifenacin (VESICARE) 10 mg tablet 90 Tab 0     Sig: Take 1 Tab by mouth daily. Patient calling to request refill on:      Remaining pills:N/A  Last appt:01/07/219  Next appt: 05/08/2019    Pharmacy:  [facility] [phone] [facility]     Patient aware of 72 hour time frame.

## 2019-04-16 RX ORDER — SOLIFENACIN SUCCINATE 10 MG/1
10 TABLET, FILM COATED ORAL DAILY
Qty: 90 TAB | Refills: 0 | Status: SHIPPED | OUTPATIENT
Start: 2019-04-16 | End: 2019-07-16 | Stop reason: SDUPTHER

## 2019-05-08 ENCOUNTER — OFFICE VISIT (OUTPATIENT)
Dept: FAMILY MEDICINE CLINIC | Age: 79
End: 2019-05-08

## 2019-05-08 VITALS
HEART RATE: 74 BPM | DIASTOLIC BLOOD PRESSURE: 78 MMHG | BODY MASS INDEX: 35 KG/M2 | RESPIRATION RATE: 16 BRPM | SYSTOLIC BLOOD PRESSURE: 120 MMHG | WEIGHT: 223 LBS | TEMPERATURE: 98.1 F | HEIGHT: 67 IN | OXYGEN SATURATION: 96 %

## 2019-05-08 DIAGNOSIS — E11.00 TYPE 2 DIABETES MELLITUS WITH HYPEROSMOLARITY WITHOUT COMA, WITHOUT LONG-TERM CURRENT USE OF INSULIN (HCC): ICD-10-CM

## 2019-05-08 DIAGNOSIS — R53.1 WEAKNESS GENERALIZED: ICD-10-CM

## 2019-05-08 DIAGNOSIS — E78.00 PURE HYPERCHOLESTEROLEMIA: ICD-10-CM

## 2019-05-08 DIAGNOSIS — R42 LIGHTHEADEDNESS: ICD-10-CM

## 2019-05-08 DIAGNOSIS — E55.9 HYPOVITAMINOSIS D: ICD-10-CM

## 2019-05-08 DIAGNOSIS — R26.81 GAIT INSTABILITY: ICD-10-CM

## 2019-05-08 DIAGNOSIS — I10 ESSENTIAL HYPERTENSION: Primary | ICD-10-CM

## 2019-05-08 DIAGNOSIS — Z86.718 HISTORY OF RECURRENT DEEP VEIN THROMBOSIS (DVT): ICD-10-CM

## 2019-05-08 LAB — HBA1C MFR BLD HPLC: 5.7 %

## 2019-05-08 RX ORDER — LISINOPRIL 10 MG/1
10 TABLET ORAL DAILY
Qty: 90 TAB | Refills: 2 | Status: SHIPPED | OUTPATIENT
Start: 2019-05-08 | End: 2019-07-31 | Stop reason: SDUPTHER

## 2019-05-08 RX ORDER — ERGOCALCIFEROL 1.25 MG/1
CAPSULE ORAL
Qty: 12 CAP | Refills: 2 | Status: SHIPPED | OUTPATIENT
Start: 2019-05-08 | End: 2020-02-13

## 2019-05-08 RX ORDER — CHOLECALCIFEROL (VITAMIN D3) 125 MCG
CAPSULE ORAL
COMMUNITY
End: 2019-05-29

## 2019-05-08 NOTE — PROGRESS NOTES
Nadira Valenzuela is a 70 y.o. female (: 1940) presenting to address: Chief Complaint Patient presents with  Hypertension  Diabetes  Other  
  seen neurology and wants Dr Villarreal Knows to order another MRI of brain  to compare to previous Vitals:  
 19 1520 BP: 120/78 Pulse: 74 Resp: 16 Temp: 98.1 °F (36.7 °C) TempSrc: Oral  
SpO2: 96% Weight: 223 lb (101.2 kg) Height: 5' 6.9\" (1.699 m) PainSc:   5 PainLoc: Leg Hearing/Vision: No exam data present Learning Assessment:  
 
Learning Assessment 2016 PRIMARY LEARNER Patient HIGHEST LEVEL OF EDUCATION - PRIMARY LEARNER  2 YEARS OF COLLEGE  
BARRIERS PRIMARY LEARNER NONE  
CO-LEARNER CAREGIVER No  
PRIMARY LANGUAGE ENGLISH  
LEARNER PREFERENCE PRIMARY LISTENING  
ANSWERED BY patient RELATIONSHIP SELF Depression Screening:  
 
3 most recent PHQ Screens 2019 Little interest or pleasure in doing things Not at all Feeling down, depressed, irritable, or hopeless Not at all Total Score PHQ 2 0 Fall Risk Assessment:  
 
Fall Risk Assessment, last 12 mths 2019 Able to walk? Yes Fall in past 12 months? No  
Fall with injury? -  
Number of falls in past 12 months - Fall Risk Score -  
 
Abuse Screening:  
 
Abuse Screening Questionnaire 2018 Do you ever feel afraid of your partner? Shanae Chowdhury Are you in a relationship with someone who physically or mentally threatens you? Shanae Chowdhury Is it safe for you to go home? Joel Parks Coordination of Care Questionaire: 1. Have you been to the ER, urgent care clinic since your last visit? Hospitalized since your last visit? NO 
 
2. Have you seen or consulted any other health care providers outside of the [address] [place] [address] since your last visit? Include any pap smears or colon screening. Yes Neurology Advanced Directive: 1. Do you have an Advanced Directive? NO 
 
2. Would you like information on Advanced Directives?  NO

## 2019-05-08 NOTE — PROGRESS NOTES
Assessment/Plan:    *Diagnoses and all orders for this visit:    1. Essential hypertension  -     lisinopril (PRINIVIL, ZESTRIL) 10 mg tablet; Take 1 Tab by mouth daily. 2. Type 2 diabetes mellitus with hyperosmolarity without coma, without long-term current use of insulin (HCC)  -     AMB POC HEMOGLOBIN A1C  -     CBC WITH AUTOMATED DIFF; Future  -     HEPATIC FUNCTION PANEL; Future  -     METABOLIC PANEL, BASIC; Future  -     TSH 3RD GENERATION; Future  -     T4, FREE; Future  -     URINALYSIS W/ RFLX MICROSCOPIC; Future  -     HEMOGLOBIN A1C W/O EAG; Future  -     MICROALBUMIN, UR, RAND W/ MICROALB/CREAT RATIO; Future    3. Pure hypercholesterolemia  -     LIPID PANEL; Future    4. Gait instability  -     REFERRAL TO PHYSICAL THERAPY  -     REFERRAL TO HOME HEALTH    5. Lightheadedness  -     REFERRAL TO PHYSICAL THERAPY  -     REFERRAL TO HOME HEALTH    6. Hypovitaminosis D  -     VITAMIN D, 25 HYDROXY; Future    7. Weakness generalized  -     REFERRAL TO HOME HEALTH    8. History of recurrent deep vein thrombosis (DVT)    Other orders  -     ergocalciferol (VITAMIN D2) 50,000 unit capsule; TAKE 1 CAPSULE EVERY 7 DAYS      Pt encouraged to go to vestibular therapy. She is dependent on her daughter's work schedule. Physical in Sept. BW. The plan was discussed with the patient. The patient verbalized understanding and is in agreement with the plan. All medication potential side effects were discussed with the patient.    -------------------------------------------------------------------------------------------------------------------        Gustavo Hernandez is a 70 y.o. female and presents with Hypertension; Diabetes; and Other (seen neurology and wants Dr Curtis Snider to order another MRI of brain  to compare to previous )         Subjective:  Pt here for f/u. No new complaints. DM:  Doing very well, A1c 5.7%, on Metformin 500 mg daily. Has some tingling in feet, not very mobile.     HTN: well controlled. HLD: typically well controlled. Has been struggling with some lightheadedness, feeling off balance as a result, uses her walker consistently. Has had a few falls. Hx of recurrent DVT, initial in approx 2014 and repeat in 2017. Will remain on long term anticoagulation. ROS:  Review of Systems - Negative except as above        The problem list was updated as a part of today's visit. Patient Active Problem List   Diagnosis Code    HTN (hypertension) I10    Arthritis M19.90    Left femoral vein DVT ([address] I82.412    Diabetes mellitus, type 2 ([address] E11.9    Degenerative disc disease, lumbar M51.36    HLD (hyperlipidemia) E78.5    Acute embolism and thrombosis of deep vein of right distal lower extremity ([address] I82.4Z1    Saddle embolus of pulmonary artery without acute cor pulmonale (HCC) I26.92    Aftercare following joint replacement surgery Z47.1    Knee pain M25.569    Hypoxia R09.02    Primary localized osteoarthrosis of pelvic region M16.10    Osteoarthritis of knee M17.10    Shortness of breath R06.02    History of total knee replacement Z96.659    Long term (current) use of anticoagulants Z79.01    Left femoral shaft fracture (HCC) S72.302A    History of recurrent deep vein thrombosis (DVT) Z86.718       The PSH, FH were reviewed. SH:  Social History     Tobacco Use    Smoking status: Never Smoker    Smokeless tobacco: Never Used   Substance Use Topics    Alcohol use: No    Drug use: No       Medications/Allergies:  Current Outpatient Medications on File Prior to Visit   Medication Sig Dispense Refill    naproxen sodium (ALEVE) 220 mg cap Take  by mouth daily as needed.  solifenacin (VESICARE) 10 mg tablet Take 1 Tab by mouth daily. 90 Tab 0    pravastatin (PRAVACHOL) 10 mg tablet Take 1 Tab by mouth nightly.  90 Tab 0    metFORMIN ER (GLUCOPHAGE XR) 500 mg tablet TAKE 1 TABLET DAILY (WITH DINNER) 90 Tab 0    apixaban (ELIQUIS) 5 mg tablet Take 1 Tab by mouth two (2) times a day. for DVT, Tab 2    Comp Stocking,Knee,Regular,Med misc For daily use - mild compression  Dx: R60.0 2 Each 0     No current facility-administered medications on file prior to visit. No Known Allergies      Health Maintenance:   Health Maintenance   Topic Date Due    Shingrix Vaccine Age 49> (1 of 2) 05/06/1992    Pneumococcal 65+ years (2 of 2 - PCV13) 01/01/2016    FOOT EXAM Q1  07/20/2017    GLAUCOMA SCREENING Q2Y  02/25/2019    EYE EXAM RETINAL OR DILATED  02/25/2019    Influenza Age 5 to Adult  08/01/2019    MEDICARE YEARLY EXAM  09/08/2019    BREAST CANCER SCRN MAMMOGRAM  10/11/2019    HEMOGLOBIN A1C Q6M  11/08/2019    MICROALBUMIN Q1  01/07/2020    LIPID PANEL Q1  01/12/2020    Cologuard Q 3 Years  10/10/2021    DTaP/Tdap/Td series (2 - Td) 06/15/2025    Bone Densitometry (Dexa) Screening  Completed       Objective:  Visit Vitals  /78 (BP 1 Location: Left arm, BP Patient Position: Sitting)   Pulse 74   Temp 98.1 °F (36.7 °C) (Oral)   Resp 16   Ht 5' 6.9\" (1.699 m)   Wt 223 lb (101.2 kg)   SpO2 96%   BMI 35.03 kg/m²          Nurses notes and VS reviewed.       Physical Examination: General appearance - alert, well appearing, and in no distress  Chest - clear to auscultation, no wheezes, rales or rhonchi, symmetric air entry  Heart - normal rate, regular rhythm, normal S1, S2, no murmurs, rubs, clicks or gallops  Abdomen - soft, nontender, nondistended, no masses or organomegaly        Labwork and Ancillary Studies:    CBC w/Diff  Lab Results   Component Value Date/Time    WBC 5.6 01/12/2019 09:41 AM    HGB 15.6 01/12/2019 09:41 AM    PLATELET 557 (L) 42/68/4165 09:41 AM         Basic Metabolic Profile  Lab Results   Component Value Date/Time    Sodium 140 01/12/2019 09:41 AM    Potassium 4.2 01/12/2019 09:41 AM    Chloride 102 01/12/2019 09:41 AM    CO2 26 01/12/2019 09:41 AM    Anion gap 12.2 01/12/2019 09:41 AM    Glucose 97 01/12/2019 09:41 AM BUN 22 01/12/2019 09:41 AM    Creatinine 0.9 01/12/2019 09:41 AM    BUN/Creatinine ratio 21 (H) 07/26/2017 10:52 AM    GFR est AA >60 07/26/2017 10:52 AM    GFR est non-AA 50 (L) 07/26/2017 10:52 AM    Calcium 9.5 01/12/2019 09:41 AM         LFT  Lab Results   Component Value Date/Time    ALT (SGPT) 10 01/12/2019 09:41 AM    AST (SGOT) 14 01/12/2019 09:41 AM    Alk.  phosphatase 95 01/12/2019 09:41 AM    Bilirubin, direct <0.2 01/12/2019 09:41 AM    Bilirubin, total 0.9 01/12/2019 09:41 AM         Cholesterol  Lab Results   Component Value Date/Time    Cholesterol, total 149 01/12/2019 09:41 AM    HDL Cholesterol 56 01/12/2019 09:41 AM    LDL, calculated 71 01/12/2019 09:41 AM    Triglyceride 109 01/12/2019 09:41 AM    CHOL/HDL Ratio 2.8 07/26/2017 10:52 AM

## 2019-05-08 NOTE — PATIENT INSTRUCTIONS

## 2019-05-16 ENCOUNTER — TELEPHONE (OUTPATIENT)
Dept: FAMILY MEDICINE CLINIC | Age: 79
End: 2019-05-16

## 2019-05-16 ENCOUNTER — HOME HEALTH ADMISSION (OUTPATIENT)
Dept: HOME HEALTH SERVICES | Facility: HOME HEALTH | Age: 79
End: 2019-05-16
Payer: MEDICARE

## 2019-05-16 ENCOUNTER — HOSPITAL ENCOUNTER (OUTPATIENT)
Dept: PHYSICAL THERAPY | Age: 79
Discharge: HOME | End: 2019-05-16
Payer: MEDICARE

## 2019-05-16 DIAGNOSIS — R26.81 GAIT INSTABILITY: ICD-10-CM

## 2019-05-16 DIAGNOSIS — R42 LIGHTHEADEDNESS: ICD-10-CM

## 2019-05-16 PROCEDURE — 97535 SELF CARE MNGMENT TRAINING: CPT

## 2019-05-16 PROCEDURE — 97162 PT EVAL MOD COMPLEX 30 MIN: CPT

## 2019-05-16 NOTE — TELEPHONE ENCOUNTER
----- Message from Soren Alexander sent at 5/16/2019  2:59 PM EDT -----  Regarding: Patient Evaluation  El Liz,  I saw your patient Julio Pierce today for her initial evaluation due to lightheadedness/ imbalance. She overall today was unable to come back from our waiting area to the treatment area and tables due to overall fatigue from traveling and leaving her home. She does not typically leave her home unless for MD visits and if she does leave her home she uses an electric scooter. She reports going to the MD or out in the community physically fatigues her for 1-2 days. I would recommend from her overall physical status and difficulty with transfers and ambulation > 100 ft she would be more qualified for home health therapy until overall activity tolerance improves. Thank you! Kirti Tao PT, DPT     If you have any concerns please feel free to call our clinic at [phone].

## 2019-05-16 NOTE — PROGRESS NOTES
[address] [facility] PHYSICAL THERAPY  [facility] 
[address] [facility] [address], [address] ,[address], [address]  Phone: [phone]  Fax: [phone] DISCHARGE [facility] FOR PHYSICAL THERAPY Patient Name: Sera Sloan : 1940 Treatment/Medical Diagnosis: Gait instability [R26.81] Lightheadedness [R42] Onset Date: Chronic worsening with sx within the past 6 mo Referral Source: Carmen Noe MD [facility]): 19 Prior Hospitalization: See Medical History Provider #: [id number] Prior Level of Function: Limited home ambulation, ambulation with FWW/rollator Comorbidities: B TKR, B THR, DVT, PE, TIA Medications: Verified on Patient Summary List  
Visits from [facility]: 1 Missed Visits: 0 Key Functional Changes/Progress: Pt seen for initial evaluation and determined more fit for home health therapy due to overall poor activity tolerance and limited to minimal community ambulation. Assessments/Recommendations: Other: not applicable. If you have any questions/comments please contact us directly at [phone]. Thank you for allowing us to assist in the care of your patient. Therapist Signature: Demetra Patino PT DPT  Date: 19 Reporting Period: 19-19 Time: 3:08 PM

## 2019-05-16 NOTE — TELEPHONE ENCOUNTER
Please call pt. Let her know that we got a notification from the physical therapist that she had a difficult time there. So, we are going to get home health to come out to her and work with her to build up her strength. Then later we can try to do out patient therapy again.

## 2019-05-16 NOTE — PROGRESS NOTES
[address]  [facility] [place] PHYSICAL THERAPY  [facility] 
[address], [address] ,[address], [address]  Phone: [phone]  Fax: [phone] PLAN OF CARE / STATEMENT OF MEDICAL NECESSITY FOR PHYSICAL THERAPY SERVICES Patient Name: Rell Bird : 1940 Medical  
Diagnosis: Imbalance Treatment Diagnosis: Gait instability [R26.81] Lightheadedness [R42] Onset Date: Chronic, worsening within the past year Referral Source: Romel Magaña MD [facility]): 2019 Prior Hospitalization: See medical history Provider #: [id number] Prior Level of Function: Limited home ambulation, ambulation with FWW/rollator Comorbidities: H/o B knee replacements, B hip replacements, DVT, PE, TIA Medications: Verified on Patient Summary List  
The Plan of Care and following information is based on the information from the initial evaluation.  
=========================================================================================== Assessment / key information:  Pt is a 70year old female presenting to [facility] with a dx of imbalance and lightheadedness. Patient reports initial onset of sx years prior to evaluation but recently reports sx have gotten worse. She reports 4 falls within the past year but is unable to recall how any of them happened. She is unable to recall any aggravating factors that cause her to feel off balance or dizzy but states she can feel it when ambulating, when dressing, and sometimes when sitting. She states that sx can last for a few seconds for up to minutes. She reports no recent medication changes. She ambulates within the home with a rollator and does not go out into the community and if she does with the help of her daughter she will only use a electric scooter provided at the store but she is unable to recall the last time she went to the store.   Patient reports she went to the MD last week and states that she was physically exhausted for 1-2 days after walking throughout MD office and traveling to the doctor. Today, upon initial evaluation she stated she was physically tired from walking from her car and to our waiting area which is less than 100 feet. She was unable to perform sit to stand from a 19\" chair today independently and required Moderate Assist to perform and c/o feeling woozy when coming to full standing. She required maximal cuing for gluteal activation and to come to full standing to prevent feelings of falling backward. BP in sittin/91 BP after standing when coming to sittin/81 mmHG. Unable to take BP in sanding due to patients fear of falling in standing. C/S AROM was full and pain free and did not recreate sx, occulomotor examination was WNL with the exception of VOR to the R she had difficulty maintaining visual fixation. Did not test for BPPV due to patient unable to walk to treatment tables today. Overall pt signs and sx suggest vestibular hypofunction with imbalance and increased risk of falling. Due to patient's overall home bound status (does not drive and only leaves the home for MD appointments) as well as overall poor tolerance to ambulation pt would benefit from [address] [place] [address] to improve balance within the home.   
=========================================================================================== Eval Complexity: History HIGH Complexity :3+ comorbidities / personal factors will impact the outcome/ POC ;  Examination  HIGH Complexity : 4+ Standardized tests and measures addressing body structure, function, activity limitation and / or participation in recreation ; Presentation HIGH Complexity : Unstable and unpredictable characteristics ; Decision Making MEDIUM Complexity : FOTO score of 26-74; Overall Complexity MEDIUM Problem List: pain affecting function, decrease ROM, decrease strength, edema affecting function, impaired gait/ balance, decrease ADL/ functional abilitiies, decrease activity tolerance, decrease flexibility/ joint mobility and decrease transfer abilities Treatment Plan may include any combination of the following: Therapeutic exercise, Therapeutic activities, Neuromuscular re-education, Physical agent/modality, Gait/balance training, Manual therapy, Aquatic therapy, Patient education, Self Care training, Functional mobility training, Home safety training and Stair training Patient / Family readiness to learn indicated by: asking questions, trying to perform skills and interest 
Persons(s) to be included in education: patient (P) Barriers to Learning/Limitations: None Measures taken, if barriers to learning:   
Patient Goal (s): \"balance\" Patient self reported health status: good Rehabilitation Potential: poor ? Short Term Goals: To be accomplished in  1  treatments: 
1) Education of fall prevention within the home. Patient / Caregiver education and instruction: self care, activity modification and exercises Therapist Signature: Soren Alexander PT DPT  Date: 5/16/2019 Certification Period: 5/16/19-8/15/19 Time: 2:37 PM  
=========================================================================================== I certify that the above Physical Therapy Services are being furnished while the patient is under my care. I agree with the treatment plan and certify that this therapy is necessary. Physician Signature:       Date:      Time:  Please sign and return to [facility] [place] or you may fax the signed copy to [phone]. Thank you.

## 2019-05-17 ENCOUNTER — HOME CARE VISIT (OUTPATIENT)
Dept: HOME HEALTH SERVICES | Facility: HOME HEALTH | Age: 79
End: 2019-05-17

## 2019-05-17 NOTE — TELEPHONE ENCOUNTER
Called the patient to let her know that [facility] JAMES ORTIZ would be calling. She stated that they have already called her this morning. Patient verbalized understanding.

## 2019-05-21 ENCOUNTER — HOME CARE VISIT (OUTPATIENT)
Dept: HOME HEALTH SERVICES | Facility: HOME HEALTH | Age: 79
End: 2019-05-21
Payer: MEDICARE

## 2019-05-21 ENCOUNTER — HOME CARE VISIT (OUTPATIENT)
Dept: SCHEDULING | Facility: HOME HEALTH | Age: 79
End: 2019-05-21
Payer: MEDICARE

## 2019-05-21 VITALS — DIASTOLIC BLOOD PRESSURE: 85 MMHG | SYSTOLIC BLOOD PRESSURE: 142 MMHG | HEART RATE: 69 BPM | OXYGEN SATURATION: 95 %

## 2019-05-21 PROCEDURE — 3331090002 HH PPS REVENUE DEBIT

## 2019-05-21 PROCEDURE — 400013 HH SOC

## 2019-05-21 PROCEDURE — 3331090001 HH PPS REVENUE CREDIT

## 2019-05-21 PROCEDURE — G0151 HHCP-SERV OF PT,EA 15 MIN: HCPCS

## 2019-05-22 ENCOUNTER — HOME CARE VISIT (OUTPATIENT)
Dept: HOME HEALTH SERVICES | Facility: HOME HEALTH | Age: 79
End: 2019-05-22
Payer: MEDICARE

## 2019-05-22 PROCEDURE — 3331090002 HH PPS REVENUE DEBIT

## 2019-05-22 PROCEDURE — 3331090001 HH PPS REVENUE CREDIT

## 2019-05-23 ENCOUNTER — HOME CARE VISIT (OUTPATIENT)
Dept: SCHEDULING | Facility: HOME HEALTH | Age: 79
End: 2019-05-23
Payer: MEDICARE

## 2019-05-23 PROCEDURE — G0157 HHC PT ASSISTANT EA 15: HCPCS

## 2019-05-23 PROCEDURE — 3331090002 HH PPS REVENUE DEBIT

## 2019-05-23 PROCEDURE — 3331090001 HH PPS REVENUE CREDIT

## 2019-05-24 ENCOUNTER — HOME CARE VISIT (OUTPATIENT)
Dept: SCHEDULING | Facility: HOME HEALTH | Age: 79
End: 2019-05-24
Payer: MEDICARE

## 2019-05-24 VITALS
SYSTOLIC BLOOD PRESSURE: 122 MMHG | TEMPERATURE: 98.8 F | HEART RATE: 72 BPM | OXYGEN SATURATION: 96 % | SYSTOLIC BLOOD PRESSURE: 140 MMHG | HEART RATE: 70 BPM | OXYGEN SATURATION: 94 % | TEMPERATURE: 98 F | DIASTOLIC BLOOD PRESSURE: 86 MMHG | DIASTOLIC BLOOD PRESSURE: 82 MMHG

## 2019-05-24 PROCEDURE — 3331090001 HH PPS REVENUE CREDIT

## 2019-05-24 PROCEDURE — G0152 HHCP-SERV OF OT,EA 15 MIN: HCPCS

## 2019-05-24 PROCEDURE — 3331090002 HH PPS REVENUE DEBIT

## 2019-05-24 PROCEDURE — G0157 HHC PT ASSISTANT EA 15: HCPCS

## 2019-05-25 VITALS
SYSTOLIC BLOOD PRESSURE: 142 MMHG | HEART RATE: 72 BPM | TEMPERATURE: 98.4 F | DIASTOLIC BLOOD PRESSURE: 92 MMHG | OXYGEN SATURATION: 96 %

## 2019-05-25 PROCEDURE — 3331090002 HH PPS REVENUE DEBIT

## 2019-05-25 PROCEDURE — 3331090001 HH PPS REVENUE CREDIT

## 2019-05-26 PROCEDURE — 3331090001 HH PPS REVENUE CREDIT

## 2019-05-26 PROCEDURE — 3331090002 HH PPS REVENUE DEBIT

## 2019-05-27 PROCEDURE — 3331090001 HH PPS REVENUE CREDIT

## 2019-05-27 PROCEDURE — 3331090002 HH PPS REVENUE DEBIT

## 2019-05-28 ENCOUNTER — HOME CARE VISIT (OUTPATIENT)
Dept: SCHEDULING | Facility: HOME HEALTH | Age: 79
End: 2019-05-28
Payer: MEDICARE

## 2019-05-28 DIAGNOSIS — I26.92 ACUTE SADDLE PULMONARY EMBOLISM WITHOUT ACUTE COR PULMONALE (HCC): ICD-10-CM

## 2019-05-28 DIAGNOSIS — I82.413 ACUTE DEEP VEIN THROMBOSIS (DVT) OF FEMORAL VEIN OF BOTH LOWER EXTREMITIES (HCC): ICD-10-CM

## 2019-05-28 PROCEDURE — 3331090001 HH PPS REVENUE CREDIT

## 2019-05-28 PROCEDURE — 3331090002 HH PPS REVENUE DEBIT

## 2019-05-28 PROCEDURE — G0157 HHC PT ASSISTANT EA 15: HCPCS

## 2019-05-28 NOTE — TELEPHONE ENCOUNTER
Requested Prescriptions     Pending Prescriptions Disp Refills    apixaban (ELIQUIS) 5 mg tablet 180 Tab 2     Sig: Take 1 Tab by mouth two (2) times a day. for DVT,PE     Patient calling to request refill on: apixaban      Remaining pills: 4  Last appt: 5/8/2019  Next appt: 9/10/2019    [facility]     Patient aware of 72 hour time frame.

## 2019-05-29 ENCOUNTER — HOME CARE VISIT (OUTPATIENT)
Dept: SCHEDULING | Facility: HOME HEALTH | Age: 79
End: 2019-05-29
Payer: MEDICARE

## 2019-05-29 VITALS
TEMPERATURE: 99 F | DIASTOLIC BLOOD PRESSURE: 74 MMHG | HEART RATE: 68 BPM | OXYGEN SATURATION: 94 % | SYSTOLIC BLOOD PRESSURE: 124 MMHG

## 2019-05-29 PROCEDURE — G0158 HHC OT ASSISTANT EA 15: HCPCS

## 2019-05-29 PROCEDURE — 3331090002 HH PPS REVENUE DEBIT

## 2019-05-29 PROCEDURE — 3331090001 HH PPS REVENUE CREDIT

## 2019-05-30 ENCOUNTER — HOME CARE VISIT (OUTPATIENT)
Dept: SCHEDULING | Facility: HOME HEALTH | Age: 79
End: 2019-05-30
Payer: MEDICARE

## 2019-05-30 VITALS
OXYGEN SATURATION: 94 % | SYSTOLIC BLOOD PRESSURE: 133 MMHG | TEMPERATURE: 98.5 F | HEART RATE: 71 BPM | DIASTOLIC BLOOD PRESSURE: 84 MMHG

## 2019-05-30 VITALS
SYSTOLIC BLOOD PRESSURE: 138 MMHG | HEART RATE: 70 BPM | TEMPERATURE: 97.4 F | OXYGEN SATURATION: 94 % | DIASTOLIC BLOOD PRESSURE: 82 MMHG

## 2019-05-30 PROCEDURE — G0157 HHC PT ASSISTANT EA 15: HCPCS

## 2019-05-30 PROCEDURE — 3331090002 HH PPS REVENUE DEBIT

## 2019-05-30 PROCEDURE — G0158 HHC OT ASSISTANT EA 15: HCPCS

## 2019-05-30 PROCEDURE — 3331090001 HH PPS REVENUE CREDIT

## 2019-05-31 ENCOUNTER — HOME CARE VISIT (OUTPATIENT)
Dept: SCHEDULING | Facility: HOME HEALTH | Age: 79
End: 2019-05-31
Payer: MEDICARE

## 2019-05-31 PROCEDURE — 3331090002 HH PPS REVENUE DEBIT

## 2019-05-31 PROCEDURE — 3331090001 HH PPS REVENUE CREDIT

## 2019-05-31 PROCEDURE — G0157 HHC PT ASSISTANT EA 15: HCPCS

## 2019-06-01 VITALS
SYSTOLIC BLOOD PRESSURE: 140 MMHG | OXYGEN SATURATION: 94 % | DIASTOLIC BLOOD PRESSURE: 60 MMHG | TEMPERATURE: 98 F | OXYGEN SATURATION: 96 % | HEART RATE: 74 BPM | DIASTOLIC BLOOD PRESSURE: 78 MMHG | HEART RATE: 60 BPM | SYSTOLIC BLOOD PRESSURE: 101 MMHG | TEMPERATURE: 98.6 F

## 2019-06-01 PROCEDURE — 3331090001 HH PPS REVENUE CREDIT

## 2019-06-01 PROCEDURE — 3331090002 HH PPS REVENUE DEBIT

## 2019-06-02 PROCEDURE — 3331090001 HH PPS REVENUE CREDIT

## 2019-06-02 PROCEDURE — 3331090002 HH PPS REVENUE DEBIT

## 2019-06-03 ENCOUNTER — HOME CARE VISIT (OUTPATIENT)
Dept: SCHEDULING | Facility: HOME HEALTH | Age: 79
End: 2019-06-03
Payer: MEDICARE

## 2019-06-03 PROCEDURE — 3331090001 HH PPS REVENUE CREDIT

## 2019-06-03 PROCEDURE — G0152 HHCP-SERV OF OT,EA 15 MIN: HCPCS

## 2019-06-03 PROCEDURE — 3331090002 HH PPS REVENUE DEBIT

## 2019-06-04 ENCOUNTER — HOME CARE VISIT (OUTPATIENT)
Dept: SCHEDULING | Facility: HOME HEALTH | Age: 79
End: 2019-06-04
Payer: MEDICARE

## 2019-06-04 VITALS
OXYGEN SATURATION: 97 % | SYSTOLIC BLOOD PRESSURE: 136 MMHG | TEMPERATURE: 98.4 F | DIASTOLIC BLOOD PRESSURE: 88 MMHG | HEART RATE: 64 BPM

## 2019-06-04 PROCEDURE — G0157 HHC PT ASSISTANT EA 15: HCPCS

## 2019-06-04 PROCEDURE — 3331090001 HH PPS REVENUE CREDIT

## 2019-06-04 PROCEDURE — 3331090002 HH PPS REVENUE DEBIT

## 2019-06-05 ENCOUNTER — HOME CARE VISIT (OUTPATIENT)
Dept: SCHEDULING | Facility: HOME HEALTH | Age: 79
End: 2019-06-05
Payer: MEDICARE

## 2019-06-05 VITALS — TEMPERATURE: 98.2 F | SYSTOLIC BLOOD PRESSURE: 130 MMHG | DIASTOLIC BLOOD PRESSURE: 68 MMHG

## 2019-06-05 PROCEDURE — 3331090001 HH PPS REVENUE CREDIT

## 2019-06-05 PROCEDURE — 3331090002 HH PPS REVENUE DEBIT

## 2019-06-05 PROCEDURE — G0153 HHCP-SVS OF S/L PATH,EA 15MN: HCPCS

## 2019-06-06 ENCOUNTER — HOME CARE VISIT (OUTPATIENT)
Dept: SCHEDULING | Facility: HOME HEALTH | Age: 79
End: 2019-06-06
Payer: MEDICARE

## 2019-06-06 VITALS — DIASTOLIC BLOOD PRESSURE: 90 MMHG | SYSTOLIC BLOOD PRESSURE: 134 MMHG | TEMPERATURE: 98.1 F

## 2019-06-06 PROCEDURE — 3331090001 HH PPS REVENUE CREDIT

## 2019-06-06 PROCEDURE — 3331090002 HH PPS REVENUE DEBIT

## 2019-06-06 PROCEDURE — G0157 HHC PT ASSISTANT EA 15: HCPCS

## 2019-06-07 PROCEDURE — 3331090001 HH PPS REVENUE CREDIT

## 2019-06-07 PROCEDURE — 3331090002 HH PPS REVENUE DEBIT

## 2019-06-08 PROCEDURE — 3331090001 HH PPS REVENUE CREDIT

## 2019-06-08 PROCEDURE — 3331090002 HH PPS REVENUE DEBIT

## 2019-06-09 PROCEDURE — 3331090001 HH PPS REVENUE CREDIT

## 2019-06-09 PROCEDURE — 3331090002 HH PPS REVENUE DEBIT

## 2019-06-10 VITALS
TEMPERATURE: 98.4 F | SYSTOLIC BLOOD PRESSURE: 120 MMHG | DIASTOLIC BLOOD PRESSURE: 70 MMHG | OXYGEN SATURATION: 97 % | HEART RATE: 72 BPM

## 2019-06-10 PROCEDURE — 3331090002 HH PPS REVENUE DEBIT

## 2019-06-10 PROCEDURE — 3331090001 HH PPS REVENUE CREDIT

## 2019-06-10 RX ORDER — PRAVASTATIN SODIUM 10 MG/1
10 TABLET ORAL
Qty: 90 TAB | Refills: 0 | Status: SHIPPED | OUTPATIENT
Start: 2019-06-10 | End: 2019-08-06 | Stop reason: SDUPTHER

## 2019-06-10 RX ORDER — METFORMIN HYDROCHLORIDE 500 MG/1
TABLET, EXTENDED RELEASE ORAL
Qty: 90 TAB | Refills: 0 | Status: SHIPPED | OUTPATIENT
Start: 2019-06-10 | End: 2019-08-16 | Stop reason: SDUPTHER

## 2019-06-10 NOTE — TELEPHONE ENCOUNTER
Requested Prescriptions     Pending Prescriptions Disp Refills    metFORMIN ER (GLUCOPHAGE XR) 500 mg tablet 90 Tab 0     Sig: TAKE 1 TABLET DAILY (WITH DINNER)    pravastatin (PRAVACHOL) 10 mg tablet 90 Tab 0     Sig: Take 1 Tab by mouth nightly. Patient calling to request refill on:      Remaining pills:  Last appt:  Next appt: Future Appointments   Date Time Provider Department Center   6/11/2019 To Be Determined Tosin Liu, PT [facility]   9/10/2019 11:00 AM Blake Grijalva MD [address] [place] [address],Third Floor:  [facility]    Patient aware of 72 hour time frame.

## 2019-06-11 ENCOUNTER — HOME CARE VISIT (OUTPATIENT)
Dept: SCHEDULING | Facility: HOME HEALTH | Age: 79
End: 2019-06-11
Payer: MEDICARE

## 2019-06-11 VITALS
DIASTOLIC BLOOD PRESSURE: 82 MMHG | OXYGEN SATURATION: 97 % | SYSTOLIC BLOOD PRESSURE: 138 MMHG | HEART RATE: 76 BPM | TEMPERATURE: 98.7 F

## 2019-06-11 PROCEDURE — 3331090001 HH PPS REVENUE CREDIT

## 2019-06-11 PROCEDURE — G0151 HHCP-SERV OF PT,EA 15 MIN: HCPCS

## 2019-06-11 PROCEDURE — 3331090003 HH PPS REVENUE ADJ

## 2019-06-11 PROCEDURE — 3331090002 HH PPS REVENUE DEBIT

## 2019-06-12 PROCEDURE — 3331090002 HH PPS REVENUE DEBIT

## 2019-06-12 PROCEDURE — 3331090001 HH PPS REVENUE CREDIT

## 2019-07-16 DIAGNOSIS — I10 ESSENTIAL HYPERTENSION: ICD-10-CM

## 2019-07-16 DIAGNOSIS — N32.81 OVERACTIVE BLADDER: ICD-10-CM

## 2019-07-16 RX ORDER — LISINOPRIL 10 MG/1
10 TABLET ORAL DAILY
Qty: 90 TAB | Refills: 2 | Status: CANCELLED | OUTPATIENT
Start: 2019-07-16

## 2019-07-16 NOTE — TELEPHONE ENCOUNTER
Vesicare last ordered 04/16/19 with a quantity of 90 with 0 refills. Patient should still have 2 refills of lisinopril.

## 2019-07-16 NOTE — TELEPHONE ENCOUNTER
Requested Prescriptions     Pending Prescriptions Disp Refills    lisinopril (PRINIVIL, ZESTRIL) 10 mg tablet 90 Tab 2     Sig: Take 1 Tab by mouth daily.  solifenacin ([facility]) 10 mg tablet 90 Tab 0     Sig: Take 1 Tab by mouth daily. Patient calling to request refill on: lisinopril, solofenacin      Remaining pills: about a weeks worth   Last appt:   Next appt: 9/10/2019    [facility]      Patient aware of 72 hour time frame.

## 2019-07-17 RX ORDER — SOLIFENACIN SUCCINATE 10 MG/1
10 TABLET, FILM COATED ORAL DAILY
Qty: 90 TAB | Refills: 0 | Status: SHIPPED | OUTPATIENT
Start: 2019-07-17 | End: 2019-09-26

## 2019-07-31 DIAGNOSIS — I10 ESSENTIAL HYPERTENSION: ICD-10-CM

## 2019-07-31 RX ORDER — LISINOPRIL 10 MG/1
10 TABLET ORAL DAILY
Qty: 90 TAB | Refills: 2 | Status: CANCELLED | OUTPATIENT
Start: 2019-07-31

## 2019-07-31 RX ORDER — ERGOCALCIFEROL 1.25 MG/1
CAPSULE ORAL
Qty: 12 CAP | Refills: 2 | Status: CANCELLED | OUTPATIENT
Start: 2019-07-31

## 2019-07-31 RX ORDER — LISINOPRIL 10 MG/1
10 TABLET ORAL DAILY
Qty: 7 TAB | Refills: 0 | Status: SHIPPED | OUTPATIENT
Start: 2019-07-31 | End: 2020-02-13

## 2019-07-31 NOTE — TELEPHONE ENCOUNTER
Requested Prescriptions     Pending Prescriptions Disp Refills    ergocalciferol (VITAMIN D2) 50,000 unit capsule 12 Cap 2     Sig: TAKE 1 CAPSULE EVERY 7 DAYS    lisinopril (PRINIVIL, ZESTRIL) 10 mg tablet 90 Tab 2     Sig: Take 1 Tab by mouth daily. Patient calling to request refill on: 7.31.19      Remaining pills: 1 week left of the vitamin d and out of lisinopril   Last appt: 5.8.19  Next appt: 9.10.19    Pharmacy: [facility]       Patient aware of 72 hour time frame. Pt called today to get a refill request asap because she is out og her bp medication. She states that she ordered from [address] last week however she never received the meds. Pt would also like a temp 1 week supply of the lisinopril to be sent to her local [facility] pharmacy on [address].  Please advise

## 2019-07-31 NOTE — TELEPHONE ENCOUNTER
Lisinopril last ordered 05/08/19 qty 90 with 2 refills. Patient still has refills. Vitamin D2 last ordered 05/08/19 qty 12 with 2 refills. Patient still has refills.

## 2019-08-06 DIAGNOSIS — I82.413 ACUTE DEEP VEIN THROMBOSIS (DVT) OF FEMORAL VEIN OF BOTH LOWER EXTREMITIES (HCC): ICD-10-CM

## 2019-08-06 DIAGNOSIS — I26.92 ACUTE SADDLE PULMONARY EMBOLISM WITHOUT ACUTE COR PULMONALE (HCC): ICD-10-CM

## 2019-08-06 RX ORDER — ERGOCALCIFEROL 1.25 MG/1
CAPSULE ORAL
Qty: 12 CAP | Refills: 2 | Status: CANCELLED | OUTPATIENT
Start: 2019-08-06

## 2019-08-06 NOTE — TELEPHONE ENCOUNTER
Requested Prescriptions     Pending Prescriptions Disp Refills    apixaban (ELIQUIS) 5 mg tablet 180 Tab 2     Sig: Take 1 Tab by mouth two (2) times a day. For recurrent DVT with PE    pravastatin (PRAVACHOL) 10 mg tablet 90 Tab 0     Sig: Take 1 Tab by mouth nightly.  ergocalciferol (VITAMIN D2) 50,000 unit capsule 12 Cap 2     Sig: TAKE 1 CAPSULE EVERY 7 DAYS       Remaining pills:  Last appt:05/08/19  Next appt:09/10/19    Pharmacy:[facility]      Patient aware of 72 hour time frame.

## 2019-08-07 RX ORDER — PRAVASTATIN SODIUM 10 MG/1
10 TABLET ORAL
Qty: 90 TAB | Refills: 0 | Status: SHIPPED | OUTPATIENT
Start: 2019-08-07 | End: 2019-12-05 | Stop reason: SDUPTHER

## 2019-08-07 NOTE — TELEPHONE ENCOUNTER
Eliquis last ordered 05/29/19 qty 180 with 2 refills. Patient should have refills on file at pharmacy. Pravastatin last ordered 06/10/19 qty 90 with 0 refills. Vitamin D last ordered 05/08/19 qty 12 with 2 refills. Patient should have refills on file at pharmacy.

## 2019-08-16 DIAGNOSIS — I10 ESSENTIAL HYPERTENSION: ICD-10-CM

## 2019-08-16 RX ORDER — LISINOPRIL 10 MG/1
10 TABLET ORAL DAILY
Qty: 7 TAB | Refills: 0 | Status: CANCELLED | OUTPATIENT
Start: 2019-08-16

## 2019-08-16 NOTE — TELEPHONE ENCOUNTER
Patient calling to request refill on:metformin      Remaining pills:7  Last appt: Wednesday, May 08, 2019  Next appt: Tuesday, September 10, 2019    Pharmacy:[facility]      Patient aware of 72 hour time frame. Patient calling to request refill on:lisinopril      Remaining pills:0  Last appt: Wednesday, May 08, 2019  Next appt: Tuesday, September 10, 2019    Pharmacy:[facility]      Patient aware of 72 hour time frame. Requested Prescriptions     Pending Prescriptions Disp Refills    metFORMIN ER (GLUCOPHAGE XR) 500 mg tablet 90 Tab 0     Sig: TAKE 1 TABLET DAILY (WITH DINNER)    lisinopril (PRINIVIL, ZESTRIL) 10 mg tablet 7 Tab 0     Sig: Take 1 Tab by mouth daily.

## 2019-08-20 RX ORDER — METFORMIN HYDROCHLORIDE 500 MG/1
TABLET, EXTENDED RELEASE ORAL
Qty: 90 TAB | Refills: 1 | Status: SHIPPED | OUTPATIENT
Start: 2019-08-20 | End: 2020-03-11

## 2019-08-20 NOTE — TELEPHONE ENCOUNTER
Lisinopril last ordered 07/31/19 qty 7 with 0 refills. Patient should have 2 refills from 05/08/19 wty 90 with 2 refills. Refill for lisinopril not needed. Metformin last ordered 06/10/19 qty 90 with 0 refills.      Last appointment 05/08/19  Next scheduled (physical) 09/10/19

## 2019-08-28 ENCOUNTER — TELEPHONE (OUTPATIENT)
Dept: FAMILY MEDICINE CLINIC | Age: 79
End: 2019-08-28

## 2019-08-28 NOTE — TELEPHONE ENCOUNTER
Pt called requesting to speak to Terra in regards to her medication that she was looking into. Pt states she needs 3 meds refilled w/ [facility]. She needs the lisinopril, Eliquis and Vitamin D. I researched the meds and it looks like the vvitamin D is the only med that needs an actual refill. The Eliquis and Lisinopril should still have refills. I asked the pt if [facility] is claiming to not have the rx that came w/ refills. She stated that she wasn't sure she just knows that when she placed an order for refills they didn't send her any medication. Please advise and call her back.

## 2019-08-29 NOTE — TELEPHONE ENCOUNTER
Called [facility] to find out if the patient still has refills on file with them, spoke with Ezequiel. He stated that the patient still has two refills on her Eliquis, Lisinopril and Vitamin D2. Asked him if the patient was set up for automatic refill, and he stated no. He also stated that when patients are not on automatic refills, they will call them to let them know it is time to request their refills be sent to them. Ezqeuiel stated that they did call and email the patient on 08/13/19 to let her know that she needed to call in the refills of those medications. When I tried to set up for them to process and ship out these refills for the patient, he stated that he really needed her to call and verify some information on her account. He also stated that since she was completely out of the medications, that they would be able to transfer a weeks worth of those medications to a local pharmacy for her to  and have until she gets the refills from them. Called the patient, and told her all of the above. The patient stated that they did not call her to alert her that she was about to need a refill. I explained that according to their records, they called and sent her an email on 08/13/19. Also, told the patient that she could set her medications up to be automatically refilled so that she did not have to remember to call every 90 days or so, and told her that she could discuss with them how to set that up. The patient verbalized understanding, and stated that she would call them.

## 2019-08-29 NOTE — TELEPHONE ENCOUNTER
Patient called to check the status of this encounter. She states that she is completely out of some of her meds.

## 2019-09-26 ENCOUNTER — OFFICE VISIT (OUTPATIENT)
Dept: FAMILY MEDICINE CLINIC | Age: 79
End: 2019-09-26

## 2019-09-26 VITALS
DIASTOLIC BLOOD PRESSURE: 78 MMHG | HEART RATE: 79 BPM | OXYGEN SATURATION: 96 % | BODY MASS INDEX: 36.32 KG/M2 | SYSTOLIC BLOOD PRESSURE: 141 MMHG | WEIGHT: 226 LBS | HEIGHT: 66 IN | TEMPERATURE: 96 F | RESPIRATION RATE: 12 BRPM

## 2019-09-26 DIAGNOSIS — Z00.00 MEDICARE ANNUAL WELLNESS VISIT, SUBSEQUENT: Primary | ICD-10-CM

## 2019-09-26 DIAGNOSIS — Z23 ENCOUNTER FOR IMMUNIZATION: ICD-10-CM

## 2019-09-26 RX ORDER — OXYBUTYNIN CHLORIDE 10 MG/1
10 TABLET, EXTENDED RELEASE ORAL DAILY
Qty: 90 TAB | Refills: 1 | Status: SHIPPED | OUTPATIENT
Start: 2019-09-26 | End: 2020-03-11

## 2019-09-26 NOTE — PROGRESS NOTES
[address] [facility] presents today for   Chief Complaint   Patient presents with   Diogo Whaley Annual Wellness Visit     Pt complains of bilateral N&T in foot     Visit Vitals  /78   Pulse 79   Temp 96 °F (35.6 °C) (Oral)   Resp 12   Ht 5' 5.5\" (1.664 m)   Wt 226 lb (102.5 kg)   SpO2 96%   BMI 37.04 kg/m²         Pepe Dart preferred language for health care discussion is english/other. Is someone accompanying this pt? Yes her son    Is the patient using any DME equipment during [address]? yes    Depression Screening:  3 most recent [facility] Screens 9/26/2019 5/8/2019 1/7/2019 9/7/2018 5/17/2018 8/23/2017 7/26/2017   Little interest or pleasure in doing things Not at all Not at all Not at all More than half the days Not at all Not at all Not at all   Feeling down, depressed, irritable, or hopeless Not at all Not at all Not at all Not at all Not at all Not at all Not at all   Total Score PHQ 2 0 0 0 2 0 0 0       Learning Assessment:  Learning Assessment 5/18/2016 3/16/2015 2/7/2014   PRIMARY LEARNER Patient Patient Patient   HIGHEST LEVEL OF EDUCATION - PRIMARY LEARNER  2 YEARS OF COLLEGE 2 [place] LEARNER NONE NONE -   CO-LEARNER CAREGIVER No No -   PRIMARY LANGUAGE ENGLISH ENGLISH ENGLISH   LEARNER PREFERENCE PRIMARY LISTENING LISTENING LISTENING   ANSWERED BY patient  self patient   RELATIONSHIP SELF SELF SELF       Abuse Screening:  Abuse Screening Questionnaire 9/26/2019 5/17/2018 8/23/2017 5/18/2016 6/15/2015 3/16/2015 12/17/2014   Do you ever feel afraid of your partner? N N N N N N N   Are you in a relationship with someone who physically or mentally threatens you? N N N N N N N   Is it safe for you to go home? Shereen CHAMPAGNE       Fall Risk  Fall Risk Assessment, last 12 mths 9/26/2019 1/7/2019 9/7/2018 5/17/2018 8/23/2017 7/26/2017 4/26/2017   Able to walk? Yes Yes Yes Yes Yes Yes Yes   Fall in past 12 months? Yes No Yes Yes No No No   Fall with injury?  No - Yes Yes - - - Number of falls in past 12 months 2 - 3 1 - - -   Fall Risk Score 2 - 4 2 - - -       Health Maintenance reviewed and discussed per provider. Yes    Morgan Foster is due for   Health Maintenance Due   Topic Date Due    Shingrix Vaccine Age 50> (1 of 2) 05/06/1992    Pneumococcal 65+ years (2 of 2 - PCV13) 01/01/2016    FOOT EXAM Q1  07/20/2017    GLAUCOMA SCREENING Q2Y  02/25/2019    EYE EXAM RETINAL OR DILATED  02/25/2019    Influenza Age 5 to Adult  08/01/2019    MEDICARE YEARLY EXAM  09/08/2019    BREAST CANCER SCRN MAMMOGRAM  10/11/2019         Please order/place referral if appropriate. Advance Directive:  1. Do you have an advance directive in place? Patient Reply: yes    2. If not, would you like material regarding how to put one in place? Patient Reply: no    Coordination of Care:  1. Have you been to the ER, urgent care clinic since your last visit? Hospitalized since your last visit? no    2. Have you seen or consulted any other health care providers outside of the [address] [place] [address] since your last visit? Include any pap smears or colon screening.  no

## 2019-09-26 NOTE — PATIENT INSTRUCTIONS
Medicare Wellness Visit, Female The best way to live healthy is to have a lifestyle where you eat a well-balanced diet, exercise regularly, limit alcohol use, and quit all forms of tobacco/nicotine, if applicable. Regular preventive services are another way to keep healthy. Preventive services (vaccines, screening tests, monitoring & exams) can help personalize your care plan, which helps you manage your own care. Screening tests can find health problems at the earliest stages, when they are easiest to treat. [facility] follows the current, evidence-based guidelines published by the [facility] ([facility]) when recommending preventive services for our patients. Because we follow these guidelines, sometimes recommendations change over time as research supports it. (For example, mammograms used to be recommended annually. Even though Medicare will still pay for an annual mammogram, the newer guidelines recommend a mammogram every two years for women of average risk.) Of course, you and your doctor may decide to screen more often for some diseases, based on your risk and your health status. Preventive services for you include: - Medicare offers their members a free annual wellness visit, which is time for you and your primary care provider to discuss and plan for your preventive service needs. Take advantage of this benefit every year! 
-All adults over the age of 72 should receive the recommended pneumonia vaccines. Current USPSTF guidelines recommend a series of two vaccines for the best pneumonia protection.  
-All adults should have a flu vaccine yearly and a tetanus vaccine every 10 years. All adults age 61 and older should receive a shingles vaccine once in their lifetime.   
-A bone mass density test is recommended when a woman turns 65 to screen for osteoporosis. This test is only recommended one time, as a screening. Some providers will use this same test as a disease monitoring tool if you already have osteoporosis. -All adults age 38-68 who are overweight should have a diabetes screening test once every three years.  
-Other screening tests and preventive services for persons with diabetes include: an eye exam to screen for diabetic retinopathy, a kidney function test, a foot exam, and stricter control over your cholesterol.  
-Cardiovascular screening for adults with routine risk involves an electrocardiogram (ECG) at intervals determined by your doctor.  
-Colorectal cancer screenings should be done for adults age 54-65 with no increased risk factors for colorectal cancer. There are a number of acceptable methods of screening for this type of cancer. Each test has its own benefits and drawbacks. Discuss with your doctor what is most appropriate for you during your annual wellness visit. The different tests include: colonoscopy (considered the best screening method), a fecal occult blood test, a fecal DNA test, and sigmoidoscopy. -Breast cancer screenings are recommended every other year for women of normal risk, age 54-69. 
-Cervical cancer screenings for women over age 72 are only recommended with certain risk factors.  
-All adults born between [facility] should be screened once for Hepatitis C. Here is a list of your current Health Maintenance items (your personalized list of preventive services) with a due date: 
Health Maintenance Due Topic Date Due  Shingles Vaccine (1 of 2) 05/06/1992  Pneumococcal Vaccine (2 of 2 - PCV13) 01/01/2016 [facility]  07/20/2017  Glaucoma Screening   02/25/2019 [facility] Eye Exam  02/25/2019  Flu Vaccine  08/01/2019 [facility] Annual Well Visit  09/08/2019  Mammogram  10/11/2019 Vaccine Information Statement Influenza (Flu) Vaccine (Inactivated or Recombinant): What You Need to Know Many Vaccine Information Statements are available in [place] and other languages. See www.immunize.org/vis Hojas de información sobre vacunas están disponibles en español y en muchos otros idiomas. Visite www.immunize.org/vis 1. Why get vaccinated? Influenza vaccine can prevent influenza (flu). Flu is a contagious disease that spreads around the [facility] every year, usually between October and May. Anyone can get the flu, but it is more dangerous for some people. Infants and young children, people 72years of age and older, pregnant women, and people with certain health conditions or a weakened immune system are at greatest risk of flu complications. Pneumonia, bronchitis, sinus infections and ear infections are examples of flu-related complications. If you have a medical condition, such as heart disease, cancer or diabetes, flu can make it worse. Flu can cause fever and chills, sore throat, muscle aches, fatigue, cough, headache, and runny or stuffy nose. Some people may have vomiting and diarrhea, though this is more common in children than adults. Each year thousands of people in the [facility] die from flu, and many more are hospitalized. Flu vaccine prevents millions of illnesses and flu-related visits to the doctor each year. 2. Influenza vaccines CDC recommends everyone 10months of age and older get vaccinated every flu season. Children 6 months through 6years of age may need 2 doses during a single flu season. Everyone else needs only 1 dose each flu season. It takes about 2 weeks for protection to develop after vaccination. There are many flu viruses, and they are always changing. Each year a new flu vaccine is made to protect against three or four viruses that are likely to cause disease in the upcoming flu season. Even when the vaccine doesnt exactly match these viruses, it may still provide some protection. Influenza vaccine does not cause flu. Influenza vaccine may be given at the same time as other vaccines. 3. Talk with your health care provider Tell your vaccine provider if the person getting the vaccine: 
 Has had an allergic reaction after a previous dose of influenza vaccine, or has any severe, life-threatening allergies.  Has ever had Guillain-Barré Syndrome (also called GBS). In some cases, your health care provider may decide to postpone influenza vaccination to a future visit. People with minor illnesses, such as a cold, may be vaccinated. People who are moderately or severely ill should usually wait until they recover before getting influenza vaccine. Your health care provider can give you more information. 4. Risks of a reaction  Soreness, redness, and swelling where shot is given, fever, muscle aches, and headache can happen after influenza vaccine.  There may be a very small increased risk of Guillain-Barré Syndrome (GBS) after inactivated influenza vaccine (the flu shot). Haley Hodgkins children who get the flu shot along with pneumococcal vaccine (PCV13), and/or DTaP vaccine at the same time might be slightly more likely to have a seizure caused by fever. Tell your health care provider if a child who is getting flu vaccine has ever had a seizure. People sometimes faint after medical procedures, including vaccination. Tell your provider if you feel dizzy or have vision changes or ringing in the ears. As with any medicine, there is a very remote chance of a vaccine causing a severe allergic reaction, other serious injury, or death. 5. What if there is a serious problem? An allergic reaction could occur after the vaccinated person leaves the clinic.  If you see signs of a severe allergic reaction (hives, swelling of the face and throat, difficulty breathing, a fast heartbeat, dizziness, or weakness), call 9-1-1 and get the person to the nearest hospital. 
 
 For other signs that concern you, call your health care provider. Adverse reactions should be reported to the Vaccine Adverse Event Reporting System (VAERS). Your health care provider will usually file this report, or you can do it yourself. Visit the VAERS website at www.vaers. hhs.gov or call 3-378.273.8967. VAERS is only for reporting reactions, and VAERS staff do not give medical advice. 6. The National Vaccine Injury Compensation Program 
 
The [facility] Vaccine Injury Compensation Program (VICP) is a federal program that was created to compensate people who may have been injured by certain vaccines. Visit the VICP website at www.[facility].gov/vaccinecompensation or call 2-388.724.5914 to learn about the program and about filing a claim. There is a time limit to file a claim for compensation. 7. How can I learn more?  Ask your health care provider.  Call your local or state health department.  Contact the Centers for Disease Control and Prevention (CDC): 
- Call 6-266.466.6847 (1-800-CDC-INFO) or 
- Visit CDCs influenza website at www.cdc.gov/flu Vaccine Information Statement (Interim) Inactivated Influenza Vaccine 8/15/2019 
42 MONICO Medranoo [id number] Department of [facility] and [employer] Centers for Disease Control and Prevention Office Use Only

## 2019-09-26 NOTE — PROGRESS NOTES
This is the Subsequent Medicare Annual Wellness Exam, performed 12 months or more after the Initial AWV or the last Subsequent AWV    I have reviewed the patient's medical history in detail and updated the computerized patient record. History     Past Medical History:   Diagnosis Date    Arthritis     Degenerative disc disease, lumbar 7/3/2014    Diabetes mellitus with diabetic neuropathy ([facility] [address] 06/2014    Fx femur shaft-closed ([facility] [address]     History of recurrent deep vein thrombosis (DVT)     HLD (hyperlipidemia) 3/16/2015    Hypertension     IFG (impaired fasting glucose) 6/26/2014    Left femoral shaft fracture ([facility] [address] 5/17/2018    Left femoral vein DVT ([facility] [address] 2/11/2014    Long term (current) use of anticoagulants 8/23/2017    2 DVT episodes in life    Saddle embolus of pulmonary artery without acute cor pulmonale ([facility] [address] 3/23/2017    Will remain on longterm anti-coagulation. Has had 2 DVT episodes      Past Surgical History:   Procedure Laterality Date    HX CHOLECYSTECTOMY      HX FEMUR FRACTURE TX      HX GYN      D and C    HX KNEE REPLACEMENT      HX ORTHOPAEDIC Right 2006    right knee    REV LOWER EYELID EXTEN FAT PAD       Current Outpatient Medications   Medication Sig Dispense Refill    oxybutynin chloride XL (DITROPAN XL) 10 mg CR tablet Take 1 Tab by mouth daily. 90 Tab 1    metFORMIN ER (GLUCOPHAGE XR) 500 mg tablet TAKE 1 TABLET DAILY (WITH DINNER) 90 Tab 1    pravastatin (PRAVACHOL) 10 mg tablet Take 1 Tab by mouth nightly. 90 Tab 0    lisinopril (PRINIVIL, ZESTRIL) 10 mg tablet Take 1 Tab by mouth daily. 7 Tab 0    apixaban (ELIQUIS) 5 mg tablet Take 1 Tab by mouth two (2) times a day. For recurrent DVT with  Tab 2    ergocalciferol (VITAMIN D2) 50,000 unit capsule TAKE 1 CAPSULE EVERY 7 DAYS 12 Cap 2    Comp Stocking,Knee,Regular,Med misc For daily use - mild compression  Dx: R60.0 2 Each 0     No Known Allergies  History reviewed. No pertinent family history.   Social History     Tobacco Use    Smoking status: Never Smoker    Smokeless tobacco: Never Used   Substance Use Topics    Alcohol use: No     Patient Active Problem List   Diagnosis Code    HTN (hypertension) I10    Arthritis M19.90    Left femoral vein DVT (HCC) I82.412    Diabetes mellitus, type 2 ([facility] [address] E11.9    Degenerative disc disease, lumbar M51.36    HLD (hyperlipidemia) E78.5    Acute embolism and thrombosis of deep vein of right distal lower extremity (HCC) I82.4Z1    Saddle embolus of pulmonary artery without acute cor pulmonale ([facility]) I26.92    Aftercare following joint replacement surgery Z47.1    Knee pain M25.569    Hypoxia R09.02    Primary localized osteoarthrosis of pelvic region M16.10    Osteoarthritis of knee M17.10    Shortness of breath R06.02    History of total knee replacement Z96.659    Long term (current) use of anticoagulants Z79.01    Left femoral shaft fracture ([facility]) S72.302A    History of recurrent deep vein thrombosis (DVT) Z86.718    Diabetes mellitus with diabetic neuropathy ([facility]) E11.40       Depression Risk Factor Screening:     3 most recent PHQ Screens 9/26/2019   Little interest or pleasure in doing things Not at all   Feeling down, depressed, irritable, or hopeless Not at all   Total Score PHQ 2 0     Alcohol Risk Factor Screening: You do not drink alcohol or very rarely. Functional Ability and Level of Safety:   Hearing Loss  Hearing is good. Activities of Daily Living  The home contains: no safety equipment. Patient does total self care    Fall Risk  Fall Risk Assessment, last 12 mths 9/26/2019   Able to walk? Yes   Fall in past 12 months? Yes   Fall with injury?  No   Number of falls in past 12 months 2   Fall Risk Score 2       Abuse Screen  Patient is not abused    Cognitive Screening   Evaluation of Cognitive Function:  Has your family/caregiver stated any concerns about your memory: no  Normal    Patient Care Team   Patient Care Team:  Nehemias Minor Ana Estrada MD as PCP - General (Internal Medicine)  Abelardo Stovall RN as Ambulatory Care Navigator  Te Bah LPN as Ambulatory Care Navigator    Assessment/Plan   Education and counseling provided:  Are appropriate based on today's review and evaluation    Diagnoses and all orders for this visit:    1. Medicare annual wellness visit, subsequent    2. Encounter for immunization  -     ADMIN INFLUENZA VIRUS VAC  -     INFLUENZA VACCINE INACTIVATED (IIV), SUBUNIT, ADJUVANTED, IM  -     ADMIN PNEUMOCOCCAL VACCINE  -     PNEUMOCOCCAL CONJ VACCINE 13 VALENT IM    Other orders  -     oxybutynin chloride XL (DITROPAN XL) 10 mg CR tablet; Take 1 Tab by mouth daily. Health Maintenance Due   Topic Date Due    Shingrix Vaccine Age 49> (1 of 2) 05/06/1992    Pneumococcal 65+ years (2 of 2 - PCV13) 01/01/2016    FOOT EXAM Q1  07/20/2017    GLAUCOMA SCREENING Q2Y  02/25/2019    EYE EXAM RETINAL OR DILATED  02/25/2019    Influenza Age 5 to Adult  08/01/2019    MEDICARE YEARLY EXAM  09/08/2019    BREAST CANCER SCRN MAMMOGRAM  10/11/2019         Pt will do labs tomorrow.

## 2019-09-27 ENCOUNTER — HOSPITAL ENCOUNTER (OUTPATIENT)
Dept: LAB | Age: 79
Discharge: HOME | End: 2019-09-27
Payer: MEDICARE

## 2019-09-27 DIAGNOSIS — E78.00 PURE HYPERCHOLESTEROLEMIA: ICD-10-CM

## 2019-09-27 DIAGNOSIS — E11.00 TYPE 2 DIABETES MELLITUS WITH HYPEROSMOLARITY WITHOUT COMA, WITHOUT LONG-TERM CURRENT USE OF INSULIN (HCC): ICD-10-CM

## 2019-09-27 LAB
ALBUMIN SERPL-MCNC: 3.9 G/DL (ref 3.4–5)
ALBUMIN/GLOB SERPL: 1.2 {RATIO} (ref 0.8–1.7)
ALP SERPL-CCNC: 88 U/L (ref 45–117)
ALT SERPL-CCNC: 19 U/L (ref 13–56)
ANION GAP SERPL CALC-SCNC: 6 MMOL/L (ref 3–18)
APPEARANCE UR: CLEAR
AST SERPL-CCNC: 16 U/L (ref 10–38)
BACTERIA URNS QL MICRO: ABNORMAL /HPF
BASOPHILS # BLD: 0 K/UL (ref 0–0.1)
BASOPHILS NFR BLD: 0 % (ref 0–2)
BILIRUB DIRECT SERPL-MCNC: 0.3 MG/DL (ref 0–0.2)
BILIRUB SERPL-MCNC: 1.1 MG/DL (ref 0.2–1)
BILIRUB UR QL: NEGATIVE
BUN SERPL-MCNC: 29 MG/DL (ref 7–18)
BUN/CREAT SERPL: 28 (ref 12–20)
CALCIUM SERPL-MCNC: 9 MG/DL (ref 8.5–10.1)
CHLORIDE SERPL-SCNC: 107 MMOL/L (ref 100–111)
CO2 SERPL-SCNC: 27 MMOL/L (ref 21–32)
COLOR UR: YELLOW
CREAT SERPL-MCNC: 1.02 MG/DL (ref 0.6–1.3)
DIFFERENTIAL METHOD BLD: ABNORMAL
EOSINOPHIL # BLD: 0.1 K/UL (ref 0–0.4)
EOSINOPHIL NFR BLD: 2 % (ref 0–5)
EPITH CASTS URNS QL MICRO: ABNORMAL /LPF (ref 0–5)
ERYTHROCYTE [DISTWIDTH] IN BLOOD BY AUTOMATED COUNT: 13.4 % (ref 11.6–14.5)
GLOBULIN SER CALC-MCNC: 3.2 G/DL (ref 2–4)
GLUCOSE SERPL-MCNC: 105 MG/DL (ref 74–99)
GLUCOSE UR STRIP.AUTO-MCNC: NEGATIVE MG/DL
HBA1C MFR BLD: 5.5 % (ref 4.2–5.6)
HCT VFR BLD AUTO: 47.4 % (ref 35–45)
HGB BLD-MCNC: 15 G/DL (ref 12–16)
HGB UR QL STRIP: NEGATIVE
KETONES UR QL STRIP.AUTO: NEGATIVE MG/DL
LEUKOCYTE ESTERASE UR QL STRIP.AUTO: ABNORMAL
LYMPHOCYTES # BLD: 2.3 K/UL (ref 0.9–3.6)
LYMPHOCYTES NFR BLD: 34 % (ref 21–52)
MCH RBC QN AUTO: 28.8 PG (ref 24–34)
MCHC RBC AUTO-ENTMCNC: 31.6 G/DL (ref 31–37)
MCV RBC AUTO: 91 FL (ref 74–97)
MONOCYTES # BLD: 0.6 K/UL (ref 0.05–1.2)
MONOCYTES NFR BLD: 8 % (ref 3–10)
NEUTS SEG # BLD: 3.8 K/UL (ref 1.8–8)
NEUTS SEG NFR BLD: 56 % (ref 40–73)
NITRITE UR QL STRIP.AUTO: NEGATIVE
PH UR STRIP: 5 [PH] (ref 5–8)
PLATELET # BLD AUTO: 118 K/UL (ref 135–420)
PMV BLD AUTO: 12.5 FL (ref 9.2–11.8)
POTASSIUM SERPL-SCNC: 4.1 MMOL/L (ref 3.5–5.5)
PROT SERPL-MCNC: 7.1 G/DL (ref 6.4–8.2)
PROT UR STRIP-MCNC: NEGATIVE MG/DL
RBC # BLD AUTO: 5.21 M/UL (ref 4.2–5.3)
RBC #/AREA URNS HPF: ABNORMAL /HPF (ref 0–5)
SODIUM SERPL-SCNC: 140 MMOL/L (ref 136–145)
SP GR UR REFRACTOMETRY: 1.02 (ref 1–1.03)
TSH SERPL DL<=0.05 MIU/L-ACNC: 1.58 UIU/ML (ref 0.36–3.74)
UROBILINOGEN UR QL STRIP.AUTO: 0.2 EU/DL (ref 0.2–1)
WBC # BLD AUTO: 6.8 K/UL (ref 4.6–13.2)
WBC URNS QL MICRO: ABNORMAL /HPF (ref 0–4)
YEAST URNS QL MICRO: ABNORMAL

## 2019-09-27 PROCEDURE — 80076 HEPATIC FUNCTION PANEL: CPT

## 2019-09-27 PROCEDURE — 81001 URINALYSIS AUTO W/SCOPE: CPT

## 2019-09-27 PROCEDURE — 83036 HEMOGLOBIN GLYCOSYLATED A1C: CPT

## 2019-09-27 PROCEDURE — 80048 BASIC METABOLIC PNL TOTAL CA: CPT

## 2019-09-27 PROCEDURE — 85025 COMPLETE CBC W/AUTO DIFF WBC: CPT

## 2019-09-27 PROCEDURE — 36415 COLL VENOUS BLD VENIPUNCTURE: CPT

## 2019-09-27 PROCEDURE — 84439 ASSAY OF FREE THYROXINE: CPT

## 2019-09-27 PROCEDURE — 80061 LIPID PANEL: CPT

## 2019-09-27 PROCEDURE — 82043 UR ALBUMIN QUANTITATIVE: CPT

## 2019-09-27 PROCEDURE — 84443 ASSAY THYROID STIM HORMONE: CPT

## 2019-09-28 LAB
CHOLEST SERPL-MCNC: 139 MG/DL
CREAT UR-MCNC: 128 MG/DL (ref 30–125)
HDLC SERPL-MCNC: 53 MG/DL (ref 40–60)
HDLC SERPL: 2.6 {RATIO} (ref 0–5)
LDLC SERPL CALC-MCNC: 67 MG/DL (ref 0–100)
LIPID PROFILE,FLP: NORMAL
MICROALBUMIN UR-MCNC: 1.09 MG/DL (ref 0–3)
MICROALBUMIN/CREAT UR-RTO: 9 MG/G (ref 0–30)
T4 FREE SERPL-MCNC: 1.1 NG/DL (ref 0.7–1.5)
TRIGL SERPL-MCNC: 95 MG/DL (ref ?–150)
VLDLC SERPL CALC-MCNC: 19 MG/DL

## 2019-10-01 NOTE — PROGRESS NOTES
Platelets are on the low side. I beleive this is related to her Eliquis, as this can be a side effect. But will watch this for now. She needs to increase her water intake.

## 2020-02-12 DIAGNOSIS — I26.92 ACUTE SADDLE PULMONARY EMBOLISM WITHOUT ACUTE COR PULMONALE (HCC): ICD-10-CM

## 2020-02-12 DIAGNOSIS — I10 ESSENTIAL HYPERTENSION: ICD-10-CM

## 2020-02-12 DIAGNOSIS — I82.413 ACUTE DEEP VEIN THROMBOSIS (DVT) OF FEMORAL VEIN OF BOTH LOWER EXTREMITIES (HCC): ICD-10-CM

## 2020-02-13 RX ORDER — ERGOCALCIFEROL 1.25 MG/1
CAPSULE ORAL
Qty: 12 CAP | Refills: 1 | Status: SHIPPED | OUTPATIENT
Start: 2020-02-13 | End: 2020-07-28

## 2020-02-13 RX ORDER — LISINOPRIL 10 MG/1
TABLET ORAL
Qty: 90 TAB | Refills: 1 | Status: SHIPPED | OUTPATIENT
Start: 2020-02-13 | End: 2020-09-02

## 2020-03-02 NOTE — TELEPHONE ENCOUNTER
Eliquis last ordered 05/29/19 qty 180 with 2 refills.      Last appt 09/27/19  Next scheduled 03/18/2020

## 2020-03-02 NOTE — TELEPHONE ENCOUNTER
Pt called for refill request    Requested Prescriptions     Pending Prescriptions Disp Refills    apixaban (ELIQUIS) 5 mg tablet 180 Tab 2     Sig: Take 1 Tab by mouth two (2) times a day.  For recurrent DVT with PE

## 2020-06-29 ENCOUNTER — TELEPHONE (OUTPATIENT)
Dept: FAMILY MEDICINE CLINIC | Age: 80
End: 2020-06-29

## 2020-06-29 RX ORDER — METFORMIN HYDROCHLORIDE 500 MG/1
500 TABLET ORAL
Qty: 90 TAB | Refills: 1 | Status: SHIPPED | OUTPATIENT
Start: 2020-06-29 | End: 2020-12-10

## 2020-06-29 NOTE — TELEPHONE ENCOUNTER
Patient called because she was contacted by [facility] advising to call her PCP's office to have her Metformin er changed .

## 2020-07-28 ENCOUNTER — TELEPHONE (OUTPATIENT)
Dept: FAMILY MEDICINE CLINIC | Age: 80
End: 2020-07-28

## 2020-07-28 DIAGNOSIS — E55.9 HYPOVITAMINOSIS D: Primary | ICD-10-CM

## 2020-07-28 DIAGNOSIS — E11.42 CONTROLLED TYPE 2 DIABETES MELLITUS WITH DIABETIC POLYNEUROPATHY, WITHOUT LONG-TERM CURRENT USE OF INSULIN (HCC): ICD-10-CM

## 2020-07-28 NOTE — TELEPHONE ENCOUNTER
Patient called complaining of tremors for the last 6 month . Some days they are really bad . Also feels dizzy all the time and getting worse over the last 3 to 4 months . I asked her about appetite says it comes and goes . Sometimes when she is watching tv she has double vision . Fasting blood sugar yesterday was 105 . Usual range in 105 to 120 fasting . Please advise .

## 2020-08-05 ENCOUNTER — OFFICE VISIT (OUTPATIENT)
Dept: FAMILY MEDICINE CLINIC | Age: 80
End: 2020-08-05

## 2020-08-05 VITALS
WEIGHT: 226 LBS | HEART RATE: 75 BPM | SYSTOLIC BLOOD PRESSURE: 145 MMHG | TEMPERATURE: 97.9 F | OXYGEN SATURATION: 96 % | RESPIRATION RATE: 18 BRPM | HEIGHT: 66 IN | DIASTOLIC BLOOD PRESSURE: 84 MMHG | BODY MASS INDEX: 36.32 KG/M2

## 2020-08-05 DIAGNOSIS — R26.89 BALANCE DISORDER: Primary | ICD-10-CM

## 2020-08-05 DIAGNOSIS — R25.1 TREMOR: ICD-10-CM

## 2020-08-05 DIAGNOSIS — R41.3 MEMORY LOSS: ICD-10-CM

## 2020-08-05 DIAGNOSIS — R53.1 WEAKNESS GENERALIZED: ICD-10-CM

## 2020-08-05 PROBLEM — E66.01 SEVERE OBESITY (HCC): Status: ACTIVE | Noted: 2020-08-05

## 2020-08-05 NOTE — PROGRESS NOTES
Assessment/Plan:    *Diagnoses and all orders for this visit:    1. Balance disorder  -     REFERRAL TO NEUROLOGY  -     REFERRAL TO PHYSICAL THERAPY    2. Memory loss  -     REFERRAL TO NEUROLOGY    3. Tremor  -     REFERRAL TO NEUROLOGY    4. Weakness generalized  -     REFERRAL TO PHYSICAL THERAPY        I explained the MRI result from 2018 to pt. This was ordered by [facility] for her but she did not seem to understand what the findings were. She will return for labs. The plan was discussed with the patient. The patient verbalized understanding and is in agreement with the plan. All medication potential side effects were discussed with the patient.    -------------------------------------------------------------------------------------------------------------------        Lauren Recinos is a 68 y.o. female and presents with Tremors; Dizziness (for last 4 months ); and Blood sugar problem (90's last 3 days . 80's one day )         Subjective:  Pt comes today with her daughter. Pt has had on-going issues with dizziness and poor balance, starting back in 2018. At that time, we had referred her to neurology for further evaluation. But patient and daughter both say that she never followed through on this. She now comes saying that the dizziness and poor balance is still an issue, she has noticed some tremors in her hands. The daughter does not really see the tremors when she is with her. Pt has issues with memory and this is apparent as we talk, she seems to stop talking in mid sentence, as if she has lost her train of thought. She looks to her daughter several times for assistance when I ask questions. She also says she has been feeling very weak, has lost ability to do her crocheting as her hands don't feel \"strong\" enough to still do this. Pt does not do much of anything all day.     She had an MRI brain, ordered by [facility] in 2018:                     Result Impression     1.  No mass, hydrocephalus, or acute findings. 2.  Small chronic bilateral cerebellar infarcts. 3.  Low-grade chronic microvascular changes. Review of Systems - General ROS: negative other than what is stated above         The problem list was updated as a part of today's visit. Patient Active Problem List   Diagnosis Code    HTN (hypertension) I10    Arthritis M19.90    Left femoral vein DVT (HCC) I82.412    Diabetes mellitus, type 2 ([facility] [address] E11.9    Degenerative disc disease, lumbar M51.36    HLD (hyperlipidemia) E78.5    Acute embolism and thrombosis of deep vein of right distal lower extremity ([address] I82.4Z1    Saddle embolus of pulmonary artery without acute cor pulmonale (HCC) I26.92    Aftercare following joint replacement surgery Z47.1    Knee pain M25.569    Hypoxia R09.02    Primary localized osteoarthrosis of pelvic region M16.10    Osteoarthritis of knee M17.10    Shortness of breath R06.02    History of total knee replacement Z96.659    Long term (current) use of anticoagulants Z79.01    Left femoral shaft fracture (HCC) S72.302A    History of recurrent deep vein thrombosis (DVT) Z86.718    Diabetes mellitus with diabetic neuropathy (HCC) E11.40    Severe obesity (HCC) E66.01       The PSH, FH were reviewed. SH:  Social History     Tobacco Use    Smoking status: Never Smoker    Smokeless tobacco: Never Used   Substance Use Topics    Alcohol use: No    Drug use: No       Medications/Allergies:  Current Outpatient Medications on File Prior to Visit   Medication Sig Dispense Refill    ergocalciferol (Vitamin D2) 1,250 mcg (50,000 unit) capsule TAKE 1 CAPSULE EVERY 7 DAYS 12 Cap 0    pravastatin (PRAVACHOL) 10 mg tablet TAKE 1 TABLET NIGHTLY 90 Tab 0    metFORMIN (GLUCOPHAGE) 500 mg tablet Take 1 Tab by mouth daily (with breakfast).  90 Tab 1    Eliquis 5 mg tablet TAKE 1 TABLET TWICE A DAY FOR RECURRENT DEEP VEIN THROMBOSIS WITH PULMONARY EMBOLISM 180 Tab 1    oxybutynin chloride XL (DITROPAN XL) 10 mg CR tablet TAKE 1 TABLET DAILY 90 Tab 1    lisinopril (PRINIVIL, ZESTRIL) 10 mg tablet TAKE 1 TABLET DAILY 90 Tab 1    Comp Stocking,Knee,Regular,Med misc For daily use - mild compression  Dx: R60.0 2 Each 0     No current facility-administered medications on file prior to visit. No Known Allergies      Health Maintenance:   Health Maintenance   Topic Date Due    Shingrix Vaccine Age 49> (1 of 2) 05/06/1992    Foot Exam Q1  07/20/2017    Influenza Age 5 to Adult  08/01/2020    Medicare Yearly Exam  09/26/2020    MICROALBUMIN Q1  09/27/2020    Lipid Screen  09/27/2020    GLAUCOMA SCREENING Q2Y  10/12/2021    Eye Exam Retinal or Dilated  10/12/2021    DTaP/Tdap/Td series (2 - Td) 06/15/2025    Bone Densitometry (Dexa) Screening  Completed    Pneumococcal 65+ years  Completed       Objective:  Visit Vitals  /84   Pulse 75   Temp 97.9 °F (36.6 °C) (Oral)   Resp 18   Ht 5' 5.5\" (1.664 m)   Wt 226 lb (102.5 kg) Comment: unable to stand for weight today   SpO2 96%   BMI 37.04 kg/m²          Nurses notes and VS reviewed.       Physical Examination: General appearance - alert, well appearing, and in no distress  Chest - clear to auscultation, no wheezes, rales or rhonchi, symmetric air entry  Heart - normal rate, regular rhythm, normal S1, S2, no murmurs, rubs, clicks or gallops          Labwork and Ancillary Studies:    CBC w/Diff  Lab Results   Component Value Date/Time    WBC 6.8 09/27/2019 09:10 AM    HGB 15.0 09/27/2019 09:10 AM    PLATELET 119 (L) 35/71/0640 09:10 AM         Basic Metabolic Profile  Lab Results   Component Value Date/Time    Sodium 140 09/27/2019 09:10 AM    Potassium 4.1 09/27/2019 09:10 AM    Chloride 107 09/27/2019 09:10 AM    CO2 27 09/27/2019 09:10 AM    Anion gap 6 09/27/2019 09:10 AM    Glucose 105 (H) 09/27/2019 09:10 AM    BUN 29 (H) 09/27/2019 09:10 AM    Creatinine 1.02 09/27/2019 09:10 AM    BUN/Creatinine ratio 28 (H) 09/27/2019 09:10 AM    GFR est AA >60 09/27/2019 09:10 AM    GFR est non-AA 53 (L) 09/27/2019 09:10 AM    Calcium 9.0 09/27/2019 09:10 AM         LFT  Lab Results   Component Value Date/Time    ALT (SGPT) 19 09/27/2019 09:10 AM    Alk.  phosphatase 88 09/27/2019 09:10 AM    Bilirubin, direct 0.3 (H) 09/27/2019 09:10 AM    Bilirubin, total 1.1 (H) 09/27/2019 09:10 AM         Cholesterol  Lab Results   Component Value Date/Time    Cholesterol, total 139 09/27/2019 09:10 AM    HDL Cholesterol 53 09/27/2019 09:10 AM    LDL, calculated 67 09/27/2019 09:10 AM    Triglyceride 95 09/27/2019 09:10 AM    CHOL/HDL Ratio 2.6 09/27/2019 09:10 AM

## 2020-08-18 ENCOUNTER — HOSPITAL ENCOUNTER (OUTPATIENT)
Dept: PHYSICAL THERAPY | Age: 80
Discharge: HOME | End: 2020-08-18
Payer: MEDICARE

## 2020-08-18 PROCEDURE — 97162 PT EVAL MOD COMPLEX 30 MIN: CPT

## 2020-08-18 PROCEDURE — 97530 THERAPEUTIC ACTIVITIES: CPT

## 2020-08-18 NOTE — PROGRESS NOTES
[address] [facility] PHYSICAL THERAPY  [facility]  Messi Escobedo \[facility] [place] [facility]\"" [address], [address] ,[address], [address]  Phone: [phone]  Fax: [phone] / [address] [address] PHYSICAL THERAPY SERVICES  Patient Name: Freda Schmid : 1940   Medical   Diagnosis: Balance disorder [R26.89] Treatment Diagnosis: Gait instability  Generalized weakness   Onset Date: >6mo ago     Referral Source: Carmelo Tapia MD [facility] [place]): 2020   Prior Hospitalization: See medical history Provider #: [id number]   Prior Level of Function: I with most ADLs   Comorbidities: FALL RISK, B RADHA and TKA   Medications: Verified on Patient Summary List   The Plan of Care and following information is based on the information from the initial evaluation.   ===========================================================================================  Assessment / key information: Patient is a 68 y.o. female who presents to [facility] with diagnosis of gait imbalance and generalized weakness. The patient reports difficulty and weakness with transfers, bed mobility and severe fear of falling. She is unable to stand up from chair and sits in lift chair. She uses grab bars in the bathroom and shower chair. She lives with her daughter who assists her with bathing, stair negotiation, transfers and ADLs. She lives sedentary lifestyle and spends most of the day sitting.      Objective PT examination findings include:  Gait Assessment: ambulates with rollator walker, shuffling gait, slowed speed/brittney, limited to <50ft due to fatigue/weakness  Transfers: requires mod A to perform sit <> stand and car transfers, requires use of B UE to push up into standing, unable to control decent to sitting  Bed mobility: difficulty scooting/bridging/rolling, requires cueing/assistance to perform supine>sit  MMT: LE strength grossly 3-/5, unable to maintain upright posture in unsupported sitting    A home exercise program was demonstrated and provided to address the above objective and functional deficits. Patient can benefit from skilled PT interventions to improve strength, decrease assistance needed for transfers, to facilitate performance of ADLs & improve overall functional status.   ===========================================================================================  Eval Complexity: History HIGH Complexity :3+ comorbidities / personal factors will impact the outcome/ POC ;  Examination  MEDIUM Complexity : 3 Standardized tests and measures addressing body structure, function, activity limitation and / or participation in recreation ; Presentation MEDIUM Complexity : Evolving with changing characteristics ; Decision Making MEDIUM Complexity : FOTO score of 26-74; Overall Complexity MEDIUM  Problem List: pain affecting function, decrease ROM, decrease strength, impaired gait/ balance, decrease ADL/ functional abilitiies, decrease activity tolerance, decrease flexibility/ joint mobility and decrease transfer abilities, FOTO score 30  Treatment Plan may include any combination of the following: Therapeutic exercise, Therapeutic activities, Neuromuscular re-education, Physical agent/modality, Gait/balance training, Manual therapy including dry needling, Aquatic therapy and Patient education  Patient / Family readiness to learn indicated by: asking questions, trying to perform skills and interest  Persons(s) to be included in education: patient (P)  Barriers to Learning/Limitations: YES  Measures taken: Recommend patient for HHPT   Patient Goal (s):  \"Be able to walk\"   Patient self reported health status: fair  Rehabilitation Potential: poor    Frequency / Duration:   Recommend patient for home health PT    Patient / Caregiver education and instruction: self care, activity modification and exercises    Therapist Signature: Jhony Vergara PT, DPT, MTC, CMTPT Date: 8/18/2020 Certification Period: 8/18/20 to 11/16/20 Time: 10:33 AM   ===========================================================================================  I certify that the above Physical Therapy Services are being furnished while the patient is under my care. I agree with the treatment plan and certify that this therapy is necessary. Physician Signature:        Date:       Time:     Please sign and return to [facility] or you may fax the signed copy to [phone]. Thank you.

## 2020-08-18 NOTE — PROGRESS NOTES
PHYSICAL THERAPY - DAILY TREATMENT NOTE    Patient Name: uGrmeet Brown        Date: 2020  : 1940   YES Patient  Verified  Visit #:     Insurance: Payor: Alicia Hind / Plan: [place] MED[place] PART [place] & B / Product Type: Medicare /      In time:  Out time: 111   Total Treatment Time: 35     BCBS/Medicare Time Tracking (below)   Total Timed Codes (min):  35 1:1 Treatment Time:  35     TREATMENT AREA =  Balance disorder [R26.89]    SUBJECTIVE  Pain Level (on 0 to 10 scale):  5  / 10   Medication Changes/New allergies or changes in medical history, any new surgeries or procedures? NO    If yes, update Summary List   Subjective Functional Status/Changes:  []  No changes reported   The patient reports difficulty and weakness with transfers, bed mobility and severe fear of falling. She is unable to stand up from chair and sits in lift chair. She uses grab bars in the bathroom and shower chair. She lives with her daughter who assists her with bathing, stair negotiation, transfers and ADLs. She lives sedentary lifestyle and spends most of the day sitting.         Modalities Rationale:     decrease inflammation, decrease pain and increase tissue extensibility to improve patient's ability to perform ADLs   min [] Estim, type/location:                                      []  att     []  unatt     []  w/US     []  w/ice    []  w/heat    min []  Mechanical Traction: type/lbs                   []  pro   []  sup   []  int   []  cont    []  before manual    []  after manual    min []  Ultrasound, settings/location:      min []  Iontophoresis w/ dexamethasone, location:                                               []  take home patch       []  in clinic    min []  Ice     []  Heat    location/position:     min []  Vasopneumatic Device, press/temp:     min []  Other:    [x] Skin assessment post-treatment (if applicable):    [x]  intact    [x]  redness- no adverse reaction     []redness  adverse reaction: min Therapeutic Exercise:  [x]  See flow sheet   Rationale:      increase ROM and increase strength to improve the patients ability to perform unlimited ADLs     15 min Therapeutic Activity: [x]  See flow sheet   Rationale:    increase ROM, increase strength, improve coordination, improve balance and increase proprioception to improve the patients ability to perform transfers     min Neuromuscular Re-ed: [x]  See flow sheet   Rationale:    improve coordination, improve balance and increase proprioception to improve the patients ability to dec risk of falls     min Gait Training:  ___ feet with ___ device on level surfaces with ___ level of assistance   Rationale: To improve ambulation safety and efficiency in order to improve patient's ability to safely ambulate at home for self care.     Billed With/As:   [] TE   [] TA   [] Neuro   [] Self Care Patient Education: [x] Review HEP    [] Progressed/Changed HEP based on:   [] positioning   [] body mechanics   [] transfers   [] heat/ice application    [x] other: Issued written HEP and reviewed     Other Objective/Functional Measures:    Gait Assessment: ambulates with rollator walker, shuffling gait, slowed speed/brittney, limited to <50ft due to fatigue/weakness  Transfers: requires mod A to perform sit <> stand and car transfers, requires use of B UE to push up into standing, unable to control decent to sitting  Bed mobility: difficulty scooting/bridging/rolling, requires cueing/assistance to perform supine>sit  MMT: LE strength grossly 3-/5, unable to maintain upright posture in unsupported sitting     Post Treatment Pain Level (on 0 to 10) scale:   5  / 10     ASSESSMENT  Assessment/Changes in Function:     See POC     []  See Progress Note/Recertification   Patient will continue to benefit from skilled PT services to modify and progress therapeutic interventions, address functional mobility deficits, address ROM deficits, address strength deficits, analyze and address soft tissue restrictions, analyze and cue movement patterns, analyze and modify body mechanics/ergonomics, assess and modify postural abnormalities, address imbalance/dizziness and instruct in home and community integration to attain remaining goals.    Progress toward goals / Updated goals:    Progressing towards newly established goals in [address]  [x]  Upgrade activities as tolerated YES Continue plan of care   []  Discharge due to :    []  Other:      Therapist: Mike Sprague, PT, DPT, MTC, CMTPT    Date: 8/18/2020 Time: 10:32 AM     Future Appointments   Date Time Provider Jadon Costa   10/1/2020  8:15 AM LAB_[facility] [place]   10/9/2020 10:00 AM MD Mik Beebe

## 2020-08-19 ENCOUNTER — TELEPHONE (OUTPATIENT)
Dept: FAMILY MEDICINE CLINIC | Age: 80
End: 2020-08-19

## 2020-08-19 NOTE — TELEPHONE ENCOUNTER
Phuc called from [facility] . Patient was seen for Evaluation and it was very exhausting for patient and she need a lot of assistance with transferring from vehicle to building . she is recommending patient be referred to Home health PT.

## 2020-08-21 ENCOUNTER — TELEPHONE (OUTPATIENT)
Dept: FAMILY MEDICINE CLINIC | Age: 80
End: 2020-08-21

## 2020-08-21 NOTE — TELEPHONE ENCOUNTER
[facility] is calling to inform the provider that the pt is currently being discharge from [facility] to a skill facility.  They will reach out to the pt once they return back home

## 2020-08-23 ENCOUNTER — TELEPHONE (OUTPATIENT)
Dept: FAMILY MEDICINE CLINIC | Age: 80
End: 2020-08-23

## 2020-08-23 NOTE — TELEPHONE ENCOUNTER
Please call pt but make sure to speak to her daughter as pt has some memory concerns. I received notice from [facility] that she had a fall and was hospitalized. I am aware that going to PT at [facility] did not work out well and they recommended home health. I placed that order on 8/19/20. Just wanted to make the family aware. We really need to work on her balance and the use of safety devices at all times to reduce her risk for falling. Just a friendly reminder about her Oct appt and labs prior.

## 2020-08-24 ENCOUNTER — APPOINTMENT (OUTPATIENT)
Dept: PHYSICAL THERAPY | Age: 80
End: 2020-08-24
Payer: MEDICARE

## 2020-08-24 ENCOUNTER — PATIENT OUTREACH (OUTPATIENT)
Dept: CASE MANAGEMENT | Age: 80
End: 2020-08-24

## 2020-08-24 NOTE — PROGRESS NOTES
Transition of Care Coordination/Hospital to Post Acute Facility:     Date/Time:  8/24/2020 1:51 PM    Patient was admitted to [facility] on 8/19/20 for treatment of acute UTI. Patient was discharged 8/21/20 to [facility]  for continuation of care. Transition of care outreach postponed for 14 days due to patient's discharge to non-preferred network SNF.

## 2020-08-28 ENCOUNTER — APPOINTMENT (OUTPATIENT)
Dept: PHYSICAL THERAPY | Age: 80
End: 2020-08-28
Payer: MEDICARE

## 2020-08-31 ENCOUNTER — APPOINTMENT (OUTPATIENT)
Dept: PHYSICAL THERAPY | Age: 80
End: 2020-08-31
Payer: MEDICARE

## 2020-09-08 ENCOUNTER — APPOINTMENT (OUTPATIENT)
Dept: PHYSICAL THERAPY | Age: 80
End: 2020-09-08

## 2020-09-09 ENCOUNTER — PATIENT OUTREACH (OUTPATIENT)
Dept: CASE MANAGEMENT | Age: 80
End: 2020-09-09

## 2020-09-09 NOTE — PROGRESS NOTES
[address] for SNF follow up. Spoke to Varun Ledezma. Provided 2 patient identifiers. Call was disconnected. Returned call. Spoke with Erminia Collet. Patient remains a current admission at this time. Will continue to monitor.

## 2020-09-10 ENCOUNTER — APPOINTMENT (OUTPATIENT)
Dept: PHYSICAL THERAPY | Age: 80
End: 2020-09-10

## 2020-09-14 ENCOUNTER — APPOINTMENT (OUTPATIENT)
Dept: PHYSICAL THERAPY | Age: 80
End: 2020-09-14

## 2020-09-16 ENCOUNTER — PATIENT OUTREACH (OUTPATIENT)
Dept: CASE MANAGEMENT | Age: 80
End: 2020-09-16

## 2020-09-16 NOTE — PROGRESS NOTES
[place] [id number] for SNF follow up. No answer. Left message providing introduction, 2 patient identifiers, reason for call and contact info. Received return call call from admissions. Notified patient was discharged 9/10/20. [facility]: [id number]    Patient was admitted to [facility] on 20 and discharged on 20 for UTI. Patient was transferred to [facility] from 20 to 9/10/20 for continuation of care. Top Discharge Challenges to be reviewed by the provider   Additional needs identified to be addressed with provider no  none  Discussed COVID-19 related testing which was available at this time. Test results were negative. Patient informed of results, if available? yes   Method of communication with provider : chart routing       Advance Care Planning:   Does patient have an Advance Directive:  yes; reviewed and current     Inpatient Readmission Risk score: 21  Was this a readmission? no   Patient stated reason for the admission: N/A  Patients top risk factors for readmission: N/A  Interventions to address risk factors: N/A    Care Transition Nurse (CTN) contacted the patient by telephone to perform post hospital discharge assessment. Verified name and  with patient as identifiers. Provided introduction to self, and explanation of the CTN role. CTN did not review discharge instructions, red flags  or COVID education or  as patient was having difficulty hearing patient. Patient requested writer contact Ольаг Mason, daughter. Patient given an opportunity to ask questions and does not have any further questions or concerns at this time. The patient agrees to contact the PCP office for questions related to their healthcare. Medication reconciliation was performed with patient, who verbalizes understanding of administration of home medications.  Advised obtaining a 90-day supply of all daily and as-needed medications. Referral to Pharm D needed: no     Home Health/Outpatient orders at discharge: home health care  [address]: [facility]  Date of initial visit: 9/12/20    Durable Medical Equipment ordered at discharge: N/A      Discussed follow-up appointments. If no appointment was previously scheduled, appointment scheduling offered: yes  [facility] follow up appointment(s):   Future Appointments   Date Time Provider Jadon Costa   10/1/2020  8:15 AM LAB_[facility] [place]   10/9/2020 10:00 AM MD Mik Singh   10/27/2020  2:30 PM MD Mik Singh   12/11/2020  1:30 PM Justen Hoyt MD [facility]     Non-[facility] follow up appointment(s): N/A  Plan for follow-up call in 7-10 days based on severity of symptoms and risk factors. CTN provided contact information for future needs. CTN contacted daughter. No answer. Message left. Returned call to daughter. Daughter currently at work. Daughter unable to commit to earlier PCP appt due to work. Will notify if earlier appt is needed. Patient was seen by urology on 9/11 with follow up scheduled in December. Notified daughter PT is to see patient today. HHA saw patient yesterday. Writer provided contact info for future reference. Goals Addressed                 This Visit's Progress     Attends follow-up appointments as directed. Goal: Patient will attend all appointments scheduled within the next 30 days. Ensure provider appt is scheduled within 7 days post-discharge   Confirm patient attended post-discharge provider appt   Complete post-visit call to confirm attendance and update care needs           Prevent complications post hospitalization. Goal: No admissions post 30 days from discharge of 8/21/20.       Review/educate common or potential \"red flags\" of condition worsening  Evaluate adherence to medications and priority barriers to resolve; 9/16: No barriers noted       Monitor lab results and symptoms, escalate to provider      Communicate visits and goals between multiple providers and services    Assess for health risk behaviors and educate patient/caregivers on reducing risk  Coordinate plan to treat at home when symptoms arise    Observe for trends in symptoms and measures, provide direction to patient, and notify provider as needed            Supportive resources in place to maintain patient in the community (ie. Home Health, DME equipment, refer to, medication assistant plan, etc.)        9/16: Contacted HH to follow up on PT as pt stated she has not receive been seen. PT to see patient today at 1 PM. HHA has also been ordered.

## 2020-09-17 ENCOUNTER — APPOINTMENT (OUTPATIENT)
Dept: PHYSICAL THERAPY | Age: 80
End: 2020-09-17

## 2020-09-21 ENCOUNTER — APPOINTMENT (OUTPATIENT)
Dept: PHYSICAL THERAPY | Age: 80
End: 2020-09-21

## 2020-09-23 ENCOUNTER — PATIENT OUTREACH (OUTPATIENT)
Dept: CASE MANAGEMENT | Age: 80
End: 2020-09-23

## 2020-09-23 NOTE — PROGRESS NOTES
Patient has graduated from the Transitions of Care Coordination  program on 9/23/20. Lashell Barrios, daughter. She voiced patient is doing fairly well and denied any needs or concerns at this time. Patient's symptoms are stable at this time. Patient/family has the ability to self-manage. Care management goals have been completed at this time. No further care transitions nurse follow up scheduled. Goals Addressed                 This Visit's Progress     COMPLETED: Attends follow-up appointments as directed. Goal: Patient will attend all appointments scheduled within the next 30 days. Ensure provider appt is scheduled within 7 days post-discharge; Daughter declined earlier PCP   Confirm patient attended post-discharge provider appt ; Patient was seen by urology on 9/11,  Complete post-visit call to confirm attendance and update care needs; Done          COMPLETED: Prevent complications post hospitalization. On track     Goal: No admissions post 30 days from discharge of 8/21/20. Review/educate common or potential \"red flags\" of condition worsening  Evaluate adherence to medications and priority barriers to resolve; 9/16: No barriers noted       Monitor lab results and symptoms, escalate to provider      Communicate visits and goals between multiple providers and services    Assess for health risk behaviors and educate patient/caregivers on reducing risk  Coordinate plan to treat at home when symptoms arise    Observe for trends in symptoms and measures, provide direction to patient, and notify provider as needed            COMPLETED: Supportive resources in place to maintain patient in the community (ie. Home Health, DME equipment, refer to, medication assistant plan, etc.)   On track     9/16: Contacted HH to follow up on PT as pt stated she has not receive been seen. PT to see patient today at 1 PM. HHA has also been ordered.              Patient has care transitions nurse contact information for any further questions, concerns, or needs.   Patient's upcoming visits:    Future Appointments   Date Time Provider Jadon Costa   10/1/2020  8:15 AM [facility]   10/9/2020 10:00 AM MD TENISHA GallagherMA [facility]   12/11/2020  1:30 PM Aguila Orr MD [facility]

## 2020-09-24 ENCOUNTER — APPOINTMENT (OUTPATIENT)
Dept: PHYSICAL THERAPY | Age: 80
End: 2020-09-24

## 2020-09-28 ENCOUNTER — APPOINTMENT (OUTPATIENT)
Dept: PHYSICAL THERAPY | Age: 80
End: 2020-09-28

## 2020-10-01 ENCOUNTER — APPOINTMENT (OUTPATIENT)
Dept: FAMILY MEDICINE CLINIC | Age: 80
End: 2020-10-01

## 2020-10-01 ENCOUNTER — HOSPITAL ENCOUNTER (OUTPATIENT)
Dept: LAB | Age: 80
Discharge: HOME | End: 2020-10-01
Payer: MEDICARE

## 2020-10-01 DIAGNOSIS — E11.42 CONTROLLED TYPE 2 DIABETES MELLITUS WITH DIABETIC POLYNEUROPATHY, WITHOUT LONG-TERM CURRENT USE OF INSULIN (HCC): ICD-10-CM

## 2020-10-01 DIAGNOSIS — E55.9 HYPOVITAMINOSIS D: ICD-10-CM

## 2020-10-01 LAB
25(OH)D3 SERPL-MCNC: 69.8 NG/ML (ref 30–100)
ALBUMIN SERPL-MCNC: 3.9 G/DL (ref 3.4–5)
ALBUMIN/GLOB SERPL: 1.1 {RATIO} (ref 0.8–1.7)
ALP SERPL-CCNC: 86 U/L (ref 45–117)
ALT SERPL-CCNC: 23 U/L (ref 13–56)
ANION GAP SERPL CALC-SCNC: 6 MMOL/L (ref 3–18)
AST SERPL-CCNC: 21 U/L (ref 10–38)
BASOPHILS # BLD: 0 K/UL (ref 0–0.1)
BASOPHILS NFR BLD: 0 % (ref 0–2)
BILIRUB DIRECT SERPL-MCNC: 0.3 MG/DL (ref 0–0.2)
BILIRUB SERPL-MCNC: 0.9 MG/DL (ref 0.2–1)
BUN SERPL-MCNC: 16 MG/DL (ref 7–18)
BUN/CREAT SERPL: 18 (ref 12–20)
CALCIUM SERPL-MCNC: 9.3 MG/DL (ref 8.5–10.1)
CHLORIDE SERPL-SCNC: 109 MMOL/L (ref 100–111)
CHOLEST SERPL-MCNC: 150 MG/DL
CO2 SERPL-SCNC: 27 MMOL/L (ref 21–32)
CREAT SERPL-MCNC: 0.87 MG/DL (ref 0.6–1.3)
DIFFERENTIAL METHOD BLD: ABNORMAL
EOSINOPHIL # BLD: 0.1 K/UL (ref 0–0.4)
EOSINOPHIL NFR BLD: 1 % (ref 0–5)
ERYTHROCYTE [DISTWIDTH] IN BLOOD BY AUTOMATED COUNT: 14.8 % (ref 11.6–14.5)
GLOBULIN SER CALC-MCNC: 3.6 G/DL (ref 2–4)
GLUCOSE SERPL-MCNC: 95 MG/DL (ref 74–99)
HBA1C MFR BLD: 5 % (ref 4.2–5.6)
HCT VFR BLD AUTO: 43.4 % (ref 35–45)
HDLC SERPL-MCNC: 77 MG/DL (ref 40–60)
HDLC SERPL: 1.9 {RATIO} (ref 0–5)
HGB BLD-MCNC: 13.8 G/DL (ref 12–16)
LDLC SERPL CALC-MCNC: 57.6 MG/DL (ref 0–100)
LIPID PROFILE,FLP: ABNORMAL
LYMPHOCYTES # BLD: 2 K/UL (ref 0.9–3.6)
LYMPHOCYTES NFR BLD: 27 % (ref 21–52)
MCH RBC QN AUTO: 28.9 PG (ref 24–34)
MCHC RBC AUTO-ENTMCNC: 31.8 G/DL (ref 31–37)
MCV RBC AUTO: 91 FL (ref 74–97)
MONOCYTES # BLD: 0.6 K/UL (ref 0.05–1.2)
MONOCYTES NFR BLD: 9 % (ref 3–10)
NEUTS SEG # BLD: 4.5 K/UL (ref 1.8–8)
NEUTS SEG NFR BLD: 63 % (ref 40–73)
PLATELET # BLD AUTO: 167 K/UL (ref 135–420)
PMV BLD AUTO: 12.8 FL (ref 9.2–11.8)
POTASSIUM SERPL-SCNC: 3.8 MMOL/L (ref 3.5–5.5)
PROT SERPL-MCNC: 7.5 G/DL (ref 6.4–8.2)
RBC # BLD AUTO: 4.77 M/UL (ref 4.2–5.3)
SODIUM SERPL-SCNC: 142 MMOL/L (ref 136–145)
T4 FREE SERPL-MCNC: 1.2 NG/DL (ref 0.7–1.5)
TRIGL SERPL-MCNC: 77 MG/DL (ref ?–150)
TSH SERPL DL<=0.05 MIU/L-ACNC: 1.16 UIU/ML (ref 0.36–3.74)
VLDLC SERPL CALC-MCNC: 15.4 MG/DL
WBC # BLD AUTO: 7.2 K/UL (ref 4.6–13.2)

## 2020-10-01 PROCEDURE — 80061 LIPID PANEL: CPT

## 2020-10-01 PROCEDURE — 80048 BASIC METABOLIC PNL TOTAL CA: CPT

## 2020-10-01 PROCEDURE — 84439 ASSAY OF FREE THYROXINE: CPT

## 2020-10-01 PROCEDURE — 82306 VITAMIN D 25 HYDROXY: CPT

## 2020-10-01 PROCEDURE — 85025 COMPLETE CBC W/AUTO DIFF WBC: CPT

## 2020-10-01 PROCEDURE — 36415 COLL VENOUS BLD VENIPUNCTURE: CPT

## 2020-10-01 PROCEDURE — 80076 HEPATIC FUNCTION PANEL: CPT

## 2020-10-01 PROCEDURE — 83036 HEMOGLOBIN GLYCOSYLATED A1C: CPT

## 2020-10-01 PROCEDURE — 84443 ASSAY THYROID STIM HORMONE: CPT

## 2020-10-05 ENCOUNTER — TELEPHONE (OUTPATIENT)
Dept: FAMILY MEDICINE CLINIC | Age: 80
End: 2020-10-05

## 2020-10-05 DIAGNOSIS — R30.0 DYSURIA: Primary | ICD-10-CM

## 2020-10-05 DIAGNOSIS — R53.83 FATIGUE, UNSPECIFIED TYPE: Primary | ICD-10-CM

## 2020-10-05 DIAGNOSIS — I10 ESSENTIAL HYPERTENSION: ICD-10-CM

## 2020-10-05 RX ORDER — LISINOPRIL 20 MG/1
TABLET ORAL
Qty: 90 TAB | Refills: 1 | Status: SHIPPED | OUTPATIENT
Start: 2020-10-05 | End: 2020-10-05 | Stop reason: SDUPTHER

## 2020-10-05 NOTE — TELEPHONE ENCOUNTER
Daughter notified of message she will have patient take 2 tabs of the  10 mg  . Patient did labs on 10/1 order date was July . She doesn't want to do urine prior to visit and will just have patient try to give sample when she is here on 10/9 .

## 2020-10-05 NOTE — TELEPHONE ENCOUNTER
I called and spoke with daughter PT came around 12:30 today and report the below concerns . Daughter said PT also used patients cuff to check bp and it was 138/82 .  She woke up this morning saying she was nauseated denies pain or odor to her urine also didn't notice any blood in urine when she emptied her bed side commode

## 2020-10-05 NOTE — TELEPHONE ENCOUNTER
Home health care nurse called to give an update on the visit she had with the patient today     Patient is feeling weak and complaining of stomach pain. Temp 98.8  BP   Right arm 169/96  Left arm 159/110  Only able to walk 10 feet before feeling tired. Patient has in office visit on 10/9 but wondering if there is something else that they can do in the mean time. Nurse thinks patients urine needs to be checked for possible UTI     You can call the daughter Ms. Gaby Montano [phone]

## 2020-10-05 NOTE — TELEPHONE ENCOUNTER
Please notify pt and her daughter that I want her to increase her Lisinopril to 20 mg every day. I also have put in a lab order for urine and blood work.

## 2020-10-06 RX ORDER — LISINOPRIL 20 MG/1
TABLET ORAL
Qty: 90 TAB | Refills: 1 | Status: SHIPPED | OUTPATIENT
Start: 2020-10-06 | End: 2020-12-18 | Stop reason: SDUPTHER

## 2020-10-09 ENCOUNTER — OFFICE VISIT (OUTPATIENT)
Dept: FAMILY MEDICINE CLINIC | Age: 80
End: 2020-10-09
Payer: MEDICARE

## 2020-10-09 VITALS
OXYGEN SATURATION: 98 % | RESPIRATION RATE: 16 BRPM | TEMPERATURE: 97.4 F | WEIGHT: 203 LBS | DIASTOLIC BLOOD PRESSURE: 63 MMHG | SYSTOLIC BLOOD PRESSURE: 99 MMHG | BODY MASS INDEX: 32.62 KG/M2 | HEART RATE: 76 BPM | HEIGHT: 66 IN

## 2020-10-09 DIAGNOSIS — I10 ESSENTIAL HYPERTENSION: ICD-10-CM

## 2020-10-09 DIAGNOSIS — E78.00 PURE HYPERCHOLESTEROLEMIA: ICD-10-CM

## 2020-10-09 DIAGNOSIS — E11.40 TYPE 2 DIABETES MELLITUS WITH DIABETIC NEUROPATHY, WITHOUT LONG-TERM CURRENT USE OF INSULIN (HCC): ICD-10-CM

## 2020-10-09 DIAGNOSIS — Z00.00 MEDICARE ANNUAL WELLNESS VISIT, SUBSEQUENT: Primary | ICD-10-CM

## 2020-10-09 DIAGNOSIS — I26.92 ACUTE SADDLE PULMONARY EMBOLISM WITHOUT ACUTE COR PULMONALE (HCC): ICD-10-CM

## 2020-10-09 DIAGNOSIS — Z23 NEEDS FLU SHOT: ICD-10-CM

## 2020-10-09 PROBLEM — E66.01 SEVERE OBESITY (HCC): Status: RESOLVED | Noted: 2020-08-05 | Resolved: 2020-10-09

## 2020-10-09 PROCEDURE — G8754 DIAS BP LESS 90: HCPCS | Performed by: INTERNAL MEDICINE

## 2020-10-09 PROCEDURE — G8536 NO DOC ELDER MAL SCRN: HCPCS | Performed by: INTERNAL MEDICINE

## 2020-10-09 PROCEDURE — G8399 PT W/DXA RESULTS DOCUMENT: HCPCS | Performed by: INTERNAL MEDICINE

## 2020-10-09 PROCEDURE — 1100F PTFALLS ASSESS-DOCD GE2>/YR: CPT | Performed by: INTERNAL MEDICINE

## 2020-10-09 PROCEDURE — 90694 VACC AIIV4 NO PRSRV 0.5ML IM: CPT

## 2020-10-09 PROCEDURE — G8427 DOCREV CUR MEDS BY ELIG CLIN: HCPCS | Performed by: INTERNAL MEDICINE

## 2020-10-09 PROCEDURE — G8417 CALC BMI ABV UP PARAM F/U: HCPCS | Performed by: INTERNAL MEDICINE

## 2020-10-09 PROCEDURE — G8510 SCR DEP NEG, NO PLAN REQD: HCPCS | Performed by: INTERNAL MEDICINE

## 2020-10-09 PROCEDURE — G8752 SYS BP LESS 140: HCPCS | Performed by: INTERNAL MEDICINE

## 2020-10-09 PROCEDURE — 3288F FALL RISK ASSESSMENT DOCD: CPT | Performed by: INTERNAL MEDICINE

## 2020-10-09 PROCEDURE — G0439 PPPS, SUBSEQ VISIT: HCPCS | Performed by: INTERNAL MEDICINE

## 2020-10-09 NOTE — PROGRESS NOTES
This is the Subsequent Medicare Annual Wellness Exam, performed 12 months or more after the Initial AWV or the last Subsequent AWV    I have reviewed the patient's medical history in detail and updated the computerized patient record.      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.92    Aftercare following joint replacement surgery Z47.1    Knee pain M25.569    Hypoxia R09.02    Primary localized osteoarthrosis of pelvic region M16.10    Osteoarthritis of knee M17.10    Shortness of breath R06.02    History of total knee replacement Z96.659    Long term (current) use of anticoagulants Z79.01    Left femoral shaft fracture ([facility] S72.302A    History of recurrent deep vein thrombosis (DVT) Z86.718    Diabetes mellitus with diabetic neuropathy (HCC) E11.40     Past Medical History:   Diagnosis Date    Acute deep vein thrombosis (DVT) of distal vein of right lower extremity (HCC)     Acute saddle pulmonary embolism (HCC)     without acute cor pulmonale    Acute UTI     Anemia     Arthritis     Chronic deep vein thrombosis (DVT) of femoral vein of both lower extremities (HCC)     Degenerative disc disease, lumbar 7/3/2014    Diabetes mellitus with diabetic neuropathy ([address] 06/2014    DVT (deep vein thrombosis) in pregnancy     Fx femur shaft-closed (HCC)     High blood pressure     History of recurrent deep vein thrombosis (DVT)     HLD (hyperlipidemia) 3/16/2015    Hypertension     Hypoxia     IFG (impaired fasting glucose) 6/26/2014    Left femoral shaft fracture ([address] 5/17/2018    Left femoral vein DVT ([facility] 2/11/2014    Long term (current) use of anticoagulants 8/23/2017    2 DVT episodes in life    Osteoarthritis     pelvic region    Pulmonary embolism ([facility]     Saddle embolus of pulmonary artery without acute cor pulmonale ([facility] 3/23/2017    Will remain on longterm anti-coagulation. Has had 2 DVT episodes    SOB (shortness of breath)     UTI (urinary tract infection)       Past Surgical History:   Procedure Laterality Date    HX CHOLECYSTECTOMY      HX FEMUR FRACTURE TX      HX GYN      D and C    HX KNEE REPLACEMENT      HX ORTHOPAEDIC Right 2006    right knee    HX TUBAL LIGATION      MT REV LOWER EYELID EXTEN FAT PAD       Current Outpatient Medications   Medication Sig Dispense Refill    lisinopriL (PRINIVIL, ZESTRIL) 20 mg tablet TAKE 1 TABLET DAILY 90 Tab 1    ergocalciferol (Vitamin D2) 1,250 mcg (50,000 unit) capsule TAKE 1 CAPSULE EVERY 7 DAYS 12 Cap 0    pravastatin (PRAVACHOL) 10 mg tablet TAKE 1 TABLET NIGHTLY 90 Tab 0    metFORMIN (GLUCOPHAGE) 500 mg tablet Take 1 Tab by mouth daily (with breakfast).  90 Tab 1    Eliquis 5 mg tablet TAKE 1 TABLET TWICE A DAY FOR RECURRENT DEEP VEIN THROMBOSIS WITH PULMONARY EMBOLISM 180 Tab 1     No Known Allergies    Family History   Problem Relation Age of Onset    Cancer Mother     Cancer Father     Anesth Problems Neg Hx      Social History     Tobacco Use    Smoking status: Never Smoker    Smokeless tobacco: Never Used    Tobacco comment: passive smoke exposure   Substance Use Topics    Alcohol use: No       Depression Risk Factor Screening:     3 most recent PHQ Screens 10/9/2020   Little interest or pleasure in doing things Not at all   Feeling down, depressed, irritable, or hopeless Not at all   Total Score PHQ 2 0       Alcohol Risk Screen   Do you average more than 1 drink per night or more than 7 drinks a week:  No    On any one occasion in the past three months have you have had more than 3 drinks containing alcohol: No        Functional Ability and Level of Safety:   Hearing: Hearing is good. Activities of Daily Living: The home contains: no safety equipment. Patient needs help with:  transportation, shopping, housework and walking     Ambulation: with difficulty, uses a walker     Fall Risk:  Fall Risk Assessment, last 12 mths 10/9/2020   Able to walk? Yes   Fall in past 12 months? Yes   Fall with injury? No   Number of falls in past 12 months 3   Fall Risk Score 3     Abuse Screen:  Patient is not abused       Cognitive Screening   Has your family/caregiver stated any concerns about your memory: yes - has been referred to neuro and will see them soon. Patient Care Team   Patient Care Team:  Silverio Riley MD as PCP - General (Internal Medicine)  Silverio Riley MD as PCP [facility]    Assessment/Plan   Education and counseling provided:  Are appropriate based on today's review and evaluation    Diagnoses and all orders for this visit:    1. Medicare annual wellness visit, subsequent    2. Needs flu shot  -     FLU (FLUAD QUAD INFLUENZA VACCINE,QUAD,ADJUVANTED)    3. Type 2 diabetes mellitus with diabetic neuropathy, without long-term current use of insulin ([facility]    4. Acute saddle pulmonary embolism without acute cor pulmonale (HCC)    5. Essential hypertension    6.  Pure hypercholesterolemia        Health Maintenance Due   Topic Date Due    Shingrix Vaccine Age 49> (1 of 2) 05/06/1992    Foot Exam Q1  07/20/2017    MICROALBUMIN Q1  09/27/2020

## 2020-10-09 NOTE — PROGRESS NOTES
Izaiah Ritchie is a 68 y.o. female (: 1940) presenting to address:    Chief Complaint   Patient presents with   [address] Annual Wellness Visit    Numbness     legs down to toes . Vitals:    10/09/20 1003   BP: 99/63   Pulse: 76   Resp: 16   Temp: 97.4 °F (36.3 °C)   TempSrc: Temporal   SpO2: 98%   Weight: 203 lb (92.1 kg)   Height: 5' 5.5\" (1.664 m)   PainSc:   0 - No pain       Hearing/Vision:      Hearing Screening    125Hz 250Hz 500Hz 1000Hz 2000Hz 3000Hz 4000Hz 6000Hz 8000Hz   Right ear:            Left ear:               Visual Acuity Screening    Right eye Left eye Both eyes   Without correction:      With correction: 20/50 20/40 20/20       Learning Assessment:     Learning Assessment 2016   PRIMARY LEARNER Patient   HIGHEST LEVEL OF EDUCATION - PRIMARY LEARNER  2 YEARS OF COLLEGE   BARRIERS PRIMARY LEARNER NONE   CO-LEARNER CAREGIVER No   PRIMARY LANGUAGE ENGLISH   LEARNER PREFERENCE PRIMARY LISTENING   ANSWERED BY patient    RELATIONSHIP SELF     Depression Screening:     3 most recent PHQ Screens 10/9/2020   Little interest or pleasure in doing things Not at all   Feeling down, depressed, irritable, or hopeless Not at all   Total Score PHQ 2 0     Fall Risk Assessment:     Fall Risk Assessment, last 12 mths 10/9/2020   Able to walk? Yes   Fall in past 12 months? Yes   Fall with injury? No   Number of falls in past 12 months 3   Fall Risk Score 3     Abuse Screening:     Abuse Screening Questionnaire 10/9/2020   Do you ever feel afraid of your partner? N   Are you in a relationship with someone who physically or mentally threatens you? N   Is it safe for you to go home? Y     Coordination of Care Questionaire:   1. Have you been to the ER, urgent care clinic since your last visit? Hospitalized since your last visit? Yes   2. Have you seen or consulted any other health care providers outside of the [address] [place] [address] since your last visit? Include any pap smears or colon screening.  Yes Advanced Directive:   1. Do you have an Advanced Directive? Yes      2. Would you like information on Advanced Directives?  NO

## 2020-10-09 NOTE — PATIENT INSTRUCTIONS
Medicare Wellness Visit, Female The best way to live healthy is to have a lifestyle where you eat a well-balanced diet, exercise regularly, limit alcohol use, and quit all forms of tobacco/nicotine, if applicable. Regular preventive services are another way to keep healthy. Preventive services (vaccines, screening tests, monitoring & exams) can help personalize your care plan, which helps you manage your own care. Screening tests can find health problems at the earliest stages, when they are easiest to treat. [facility] follows the current, evidence-based guidelines published by the [facility] (RUSTSTF) when recommending preventive services for our patients. Because we follow these guidelines, sometimes recommendations change over time as research supports it. (For example, mammograms used to be recommended annually. Even though Medicare will still pay for an annual mammogram, the newer guidelines recommend a mammogram every two years for women of average risk). Of course, you and your doctor may decide to screen more often for some diseases, based on your risk and your co-morbidities (chronic disease you are already diagnosed with). Preventive services for you include: - Medicare offers their members a free annual wellness visit, which is time for you and your primary care provider to discuss and plan for your preventive service needs. Take advantage of this benefit every year! 
-All adults over the age of 72 should receive the recommended pneumonia vaccines. Current USPSTF guidelines recommend a series of two vaccines for the best pneumonia protection.  
-All adults should have a flu vaccine yearly and a tetanus vaccine every 10 years.  
-All adults age 48 and older should receive the shingles vaccines (series of two vaccines). -All adults age 38-68 who are overweight should have a diabetes screening test once every three years. -All adults born between [birth year] and 1965 should be screened once for Hepatitis C. 
-Other screening tests and preventive services for persons with diabetes include: an eye exam to screen for diabetic retinopathy, a kidney function test, a foot exam, and stricter control over your cholesterol.  
-Cardiovascular screening for adults with routine risk involves an electrocardiogram (ECG) at intervals determined by your doctor.  
-Colorectal cancer screenings should be done for adults age 54-65 with no increased risk factors for colorectal cancer. There are a number of acceptable methods of screening for this type of cancer. Each test has its own benefits and drawbacks. Discuss with your doctor what is most appropriate for you during your annual wellness visit. The different tests include: colonoscopy (considered the best screening method), a fecal occult blood test, a fecal DNA test, and sigmoidoscopy. 
 
-A bone mass density test is recommended when a woman turns 65 to screen for osteoporosis. This test is only recommended one time, as a screening. Some providers will use this same test as a disease monitoring tool if you already have osteoporosis. -Breast cancer screenings are recommended every other year for women of normal risk, age 54-69. 
-Cervical cancer screenings for women over age 72 are only recommended with certain risk factors. Here is a list of your current Health Maintenance items (your personalized list of preventive services) with a due date: 
Health Maintenance Due Topic Date Due  Shingles Vaccine (1 of 2) 05/06/1992 [facility] Diabetic Foot Care  07/20/2017  Yearly Flu Vaccine (1) 09/01/2020  Albumin Urine Test  09/27/2020 [facility] Annual Well Visit  09/26/2020

## 2020-10-09 NOTE — PROGRESS NOTES
Immunization/s administered 10/9/2020 by Erika Wallace LPN with guardian's consent. Patient tolerated procedure well. No reactions noted.     fluad left deltoid

## 2020-10-12 ENCOUNTER — VIRTUAL VISIT (OUTPATIENT)
Dept: FAMILY MEDICINE CLINIC | Age: 80
End: 2020-10-12
Payer: MEDICARE

## 2020-10-12 DIAGNOSIS — R26.89 BALANCE DISORDER: ICD-10-CM

## 2020-10-12 DIAGNOSIS — E11.40 TYPE 2 DIABETES MELLITUS WITH DIABETIC NEUROPATHY, WITHOUT LONG-TERM CURRENT USE OF INSULIN (HCC): Primary | ICD-10-CM

## 2020-10-12 DIAGNOSIS — R41.3 MEMORY LOSS: ICD-10-CM

## 2020-10-12 PROCEDURE — 99213 OFFICE O/P EST LOW 20 MIN: CPT | Performed by: INTERNAL MEDICINE

## 2020-10-12 NOTE — PROGRESS NOTES
Apollo Hoffman is a 68 y.o. female who was seen by synchronous (real-time) audio-video technology on 10/12/2020 for No chief complaint on file. Assessment & Plan:   Diagnoses and all orders for this visit:    1. Type 2 diabetes mellitus with diabetic neuropathy, without long-term current use of insulin ([address]    2. Memory loss    3. Balance disorder        FMLA form completed and faxed to daughter's work place. [phone]  Subjective:     Patient and daughter seen on virtual visit. Pt is of advanced age and has several medical health issues. She relies on her daughter, Nidhi Lopez, to assist her with ADLs including getting her to appts. Prior to Admission medications    Medication Sig Start Date End Date Taking? Authorizing Provider   lisinopriL (PRINIVIL, ZESTRIL) 20 mg tablet TAKE 1 TABLET DAILY 10/6/20   Jordyn Hobson MD   ergocalciferol (Vitamin D2) 1,250 mcg (50,000 unit) capsule TAKE 1 CAPSULE EVERY 7 DAYS 7/28/20   Jordyn Hobson MD   pravastatin (PRAVACHOL) 10 mg tablet TAKE 1 TABLET NIGHTLY 7/23/20   Jordyn Hobson MD   metFORMIN (GLUCOPHAGE) 500 mg tablet Take 1 Tab by mouth daily (with breakfast).  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) I26.92    Aftercare following joint replacement surgery Z47.1    Knee pain M25.569    Hypoxia R09.02    Primary localized osteoarthrosis of pelvic region M16.10    Osteoarthritis of knee M17.10    Shortness of breath R06.02    History of total knee replacement Z96.659    Long term (current) use of anticoagulants Z79.01    Left femoral shaft fracture (HCC) S72.302A    History of recurrent deep vein thrombosis (DVT) Z86.718    Diabetes mellitus with diabetic neuropathy (HCC) E11.40     Current Outpatient Medications   Medication Sig Dispense Refill    lisinopriL (PRINIVIL, ZESTRIL) 20 mg tablet TAKE 1 TABLET DAILY 90 Tab 1    ergocalciferol (Vitamin D2) 1,250 mcg (50,000 unit) capsule TAKE 1 CAPSULE EVERY 7 DAYS 12 Cap 0    pravastatin (PRAVACHOL) 10 mg tablet TAKE 1 TABLET NIGHTLY 90 Tab 0    metFORMIN (GLUCOPHAGE) 500 mg tablet Take 1 Tab by mouth daily (with breakfast). 90 Tab 1    Eliquis 5 mg tablet TAKE 1 TABLET TWICE A DAY FOR RECURRENT DEEP VEIN THROMBOSIS WITH PULMONARY EMBOLISM 180 Tab 1     No Known Allergies  Past Medical History:   Diagnosis Date    Acute deep vein thrombosis (DVT) of distal vein of right lower extremity (HCC)     Acute saddle pulmonary embolism (HCC)     without acute cor pulmonale    Acute UTI     Anemia     Arthritis     Chronic deep vein thrombosis (DVT) of femoral vein of both lower extremities (HCC)     Degenerative disc disease, lumbar 7/3/2014    Diabetes mellitus with diabetic neuropathy ([address] 06/2014    DVT (deep vein thrombosis) in pregnancy     Fx femur shaft-closed (HCC)     High blood pressure     History of recurrent deep vein thrombosis (DVT)     HLD (hyperlipidemia) 3/16/2015    Hypertension     Hypoxia     IFG (impaired fasting glucose) 6/26/2014    Left femoral shaft fracture ([address] 5/17/2018    Left femoral vein DVT ([address] 2/11/2014    Long term (current) use of anticoagulants 8/23/2017    2 DVT episodes in life    Osteoarthritis     pelvic region    Pulmonary embolism (HCC)     Saddle embolus of pulmonary artery without acute cor pulmonale ([address] 3/23/2017    Will remain on longterm anti-coagulation.   Has had 2 DVT episodes    SOB (shortness of breath)     UTI (urinary tract infection)      Past Surgical History:   Procedure Laterality Date    HX CHOLECYSTECTOMY      HX FEMUR FRACTURE TX      HX GYN      D and C    HX KNEE REPLACEMENT      HX ORTHOPAEDIC Right 2006    right knee    HX TUBAL LIGATION      AK REV LOWER EYELID EXTEN FAT PAD       Family History   Problem Relation Age of Onset    Cancer Mother     Cancer Father     Anesth Problems Neg Hx      Social History     Tobacco Use    Smoking status: Never Smoker    Smokeless tobacco: Never Used    Tobacco comment: passive smoke exposure   Substance Use Topics    Alcohol use: No       Review of Systems   All other systems reviewed and are negative. Objective:   No flowsheet data found. General: alert, cooperative, no distress   Mental  status: normal mood, behavior, speech, dress, motor activity, and thought processes, able to follow commands   HENT: NCAT   Neck: no visualized mass   Resp: no respiratory distress   Neuro: no gross deficits   Skin: no discoloration or lesions of concern on visible areas   Psychiatric: normal affect, consistent with stated mood, no evidence of hallucinations     Additional exam findings: We discussed the expected course, resolution and complications of the diagnosis(es) in detail. Medication risks, benefits, costs, interactions, and alternatives were discussed as indicated. I advised her to contact the office if her condition worsens, changes or fails to improve as anticipated. She expressed understanding with the diagnosis(es) and plan. Mayuri Otero, who was evaluated through a patient-initiated, synchronous (real-time) audio-video encounter, and/or her healthcare decision maker, is aware that it is a billable service, with coverage as determined by her insurance carrier. She provided verbal consent to proceed: Yes, and patient identification was verified.  It was conducted pursuant to the emergency declaration under the [address] and the [address] authority and the [employer] and [employer] Act. A caregiver was present when appropriate. Ability to conduct physical exam was limited. I was in the office. The patient was at home.       Rio Candelaria MD

## 2020-10-20 RX ORDER — ERGOCALCIFEROL 1.25 MG/1
CAPSULE ORAL
Qty: 12 CAP | Refills: 3 | Status: SHIPPED | OUTPATIENT
Start: 2020-10-20

## 2020-10-21 ENCOUNTER — PATIENT OUTREACH (OUTPATIENT)
Dept: CASE MANAGEMENT | Age: 80
End: 2020-10-21

## 2020-10-21 NOTE — PROGRESS NOTES
Transition of Care Coordination/Hospital to Post Acute Facility:     Date/Time:  10/21/2020 1:40 PM    Patient was admitted to [facility] on 10/15/20 for treatment of UTI secondary to klebsiella. Patient was discharged 10/20/20 to [facility] for continuation of care. Transition of care outreach postponed for 14 days due to patient's discharge to SNF.

## 2020-11-04 ENCOUNTER — PATIENT OUTREACH (OUTPATIENT)
Dept: CASE MANAGEMENT | Age: 80
End: 2020-11-04

## 2020-11-04 NOTE — PROGRESS NOTES
Neil De La Garza [facility] for SNF follow up. Spoke to [place]. Provided 2 patient identifiers. Patient remains a current admission at this time. Anticipated discharge date unknown. Left message for Phuc   to return call. Provided 2 patient identifiers and contact info. Chart forwarded to [facility], RN Care Transitions Nurse for continued monitoring.

## 2020-11-09 ENCOUNTER — TELEPHONE (OUTPATIENT)
Dept: FAMILY MEDICINE CLINIC | Age: 80
End: 2020-11-09

## 2020-11-11 ENCOUNTER — VIRTUAL VISIT (OUTPATIENT)
Dept: FAMILY MEDICINE CLINIC | Age: 80
End: 2020-11-11
Payer: MEDICARE

## 2020-11-11 DIAGNOSIS — N30.00 ACUTE CYSTITIS WITHOUT HEMATURIA: ICD-10-CM

## 2020-11-11 DIAGNOSIS — R41.3 MEMORY LOSS: Primary | ICD-10-CM

## 2020-11-11 PROCEDURE — G0463 HOSPITAL OUTPT CLINIC VISIT: HCPCS | Performed by: INTERNAL MEDICINE

## 2020-11-11 PROCEDURE — 99213 OFFICE O/P EST LOW 20 MIN: CPT | Performed by: INTERNAL MEDICINE

## 2020-11-11 RX ORDER — DONEPEZIL HYDROCHLORIDE 5 MG/1
5 TABLET, FILM COATED ORAL
Qty: 90 TAB | Refills: 0 | Status: SHIPPED | OUTPATIENT
Start: 2020-11-11

## 2020-11-11 NOTE — PROGRESS NOTES
Sera Candelaria is a 68 y.o. female who was seen by synchronous (real-time) audio-video technology on 11/11/2020 for Follow Up Chronic Condition        Assessment & Plan:   Diagnoses and all orders for this visit:    1. Memory loss    2. Acute cystitis without hematuria    Other orders  -     donepeziL (ARICEPT) 5 mg tablet; Take 1 Tab by mouth nightly. Indications: moderate to severe Alzheimer's type dementia        Pt needs to follow up with [facility] neurology to get set up with neuropsych testing and the movement disorder team.  712  Subjective:     Pt seen for f/u with her daughter, Teri Gonzalez. Memory loss; neuro started her on Aricept. Has put in a few referrals as noted above. Was hospitalized in mid-Oct for UTI. Went to [facility]. Is better now. Prior to Admission medications    Medication Sig Start Date End Date Taking? Authorizing Provider   donepeziL (ARICEPT) 5 mg tablet Take 1 Tab by mouth nightly. Indications: moderate to severe Alzheimer's type dementia 11/11/20  Yes Mariam Burroughs MD   pravastatin (PRAVACHOL) 10 mg tablet TAKE 1 TABLET NIGHTLY 10/28/20   Mariam Burroughs MD   ergocalciferol (Vitamin D2) 1,250 mcg (50,000 unit) capsule TAKE 1 CAPSULE EVERY 7 DAYS 10/20/20   Mariam Burroughs MD   lisinopriL (PRINIVIL, ZESTRIL) 20 mg tablet TAKE 1 TABLET DAILY 10/6/20   Mariam Burroughs MD   metFORMIN (GLUCOPHAGE) 500 mg tablet Take 1 Tab by mouth daily (with breakfast).  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    Aftercare following joint replacement surgery Z47.1    Knee pain M25.569    Hypoxia R09.02    Primary localized osteoarthrosis of pelvic region M16.10    Osteoarthritis of knee M17.10    Shortness of breath R06.02    History of total knee replacement Z96.659    Long term (current) use of anticoagulants Z79.01    Left femoral shaft fracture (HCC) S72.302A    History of recurrent deep vein thrombosis (DVT) Z86.718    Diabetes mellitus with diabetic neuropathy (HCC) E11.40     Current Outpatient Medications   Medication Sig Dispense Refill    donepeziL (ARICEPT) 5 mg tablet Take 1 Tab by mouth nightly. Indications: moderate to severe Alzheimer's type dementia 90 Tab 0    pravastatin (PRAVACHOL) 10 mg tablet TAKE 1 TABLET NIGHTLY 90 Tab 2    ergocalciferol (Vitamin D2) 1,250 mcg (50,000 unit) capsule TAKE 1 CAPSULE EVERY 7 DAYS 12 Cap 3    lisinopriL (PRINIVIL, ZESTRIL) 20 mg tablet TAKE 1 TABLET DAILY 90 Tab 1    metFORMIN (GLUCOPHAGE) 500 mg tablet Take 1 Tab by mouth daily (with breakfast).  90 Tab 1    Eliquis 5 mg tablet TAKE 1 TABLET TWICE A DAY FOR RECURRENT DEEP VEIN THROMBOSIS WITH PULMONARY EMBOLISM 180 Tab 1     No Known Allergies  Past Medical History:   Diagnosis Date    Acute deep vein thrombosis (DVT) of distal vein of right lower extremity (HCC)     Acute saddle pulmonary embolism (HCC)     without acute cor pulmonale    Acute UTI     Anemia     Arthritis     Chronic deep vein thrombosis (DVT) of femoral vein of both lower extremities (HCC)     Degenerative disc disease, lumbar 7/3/2014    Diabetes mellitus with diabetic neuropathy ([facility] 06/2014    DVT (deep vein thrombosis) in pregnancy     Fx femur shaft-closed (HCC)     High blood pressure     History of recurrent deep vein thrombosis (DVT)     HLD (hyperlipidemia) 3/16/2015    Hypertension     Hypoxia     IFG (impaired fasting glucose) 6/26/2014    Left femoral shaft fracture ([facility] 5/17/2018    Left femoral vein DVT ([facility] 2/11/2014    Long term (current) use of anticoagulants 8/23/2017    2 DVT episodes in life    Osteoarthritis     pelvic region    Pulmonary embolism ([address]     Saddle embolus of pulmonary artery without acute cor pulmonale ([address] 3/23/2017    Will remain on longterm anti-coagulation. Has had 2 DVT episodes    SOB (shortness of breath)     UTI (urinary tract infection)      Past Surgical History:   Procedure Laterality Date    HX CHOLECYSTECTOMY      HX FEMUR FRACTURE TX      HX GYN      D and C    HX KNEE REPLACEMENT      HX ORTHOPAEDIC Right 2006    right knee    HX TUBAL LIGATION      CO REV LOWER EYELID EXTEN FAT PAD       Family History   Problem Relation Age of Onset    Cancer Mother     Cancer Father     Anesth Problems Neg Hx      Social History     Tobacco Use    Smoking status: Never Smoker    Smokeless tobacco: Never Used    Tobacco comment: passive smoke exposure   Substance Use Topics    Alcohol use: No       Review of Systems   All other systems reviewed and are negative. Objective:   No flowsheet data found. General: alert, cooperative, no distress   Mental  status: normal mood, behavior, speech, dress, motor activity, and thought processes, able to follow commands   HENT: NCAT   Neck: no visualized mass   Resp: no respiratory distress   Neuro: no gross deficits   Skin: no discoloration or lesions of concern on visible areas   Psychiatric: normal affect, consistent with stated mood, no evidence of hallucinations     Additional exam findings: We discussed the expected course, resolution and complications of the diagnosis(es) in detail. Medication risks, benefits, costs, interactions, and alternatives were discussed as indicated. I advised her to contact the office if her condition worsens, changes or fails to improve as anticipated.  She expressed understanding with the diagnosis(es) and plan. Arjun Ng, who was evaluated through a patient-initiated, synchronous (real-time) audio-video encounter, and/or her healthcare decision maker, is aware that it is a billable service, with coverage as determined by her insurance carrier. She provided verbal consent to proceed: Yes, and patient identification was verified. It was conducted pursuant to the emergency declaration under the [address] [place] [address] and the [employer]. A caregiver was present when appropriate. Ability to conduct physical exam was limited. I was in the office. The patient was at home.       Bruno Watts MD

## 2020-11-19 DIAGNOSIS — I26.92 ACUTE SADDLE PULMONARY EMBOLISM WITHOUT ACUTE COR PULMONALE (HCC): ICD-10-CM

## 2020-11-19 DIAGNOSIS — I82.413 ACUTE DEEP VEIN THROMBOSIS (DVT) OF FEMORAL VEIN OF BOTH LOWER EXTREMITIES (HCC): ICD-10-CM

## 2020-11-19 NOTE — TELEPHONE ENCOUNTER
Last refilled 3/30/20 for 180 with 1 .  Last OV 11/11/20  Future Appointments   Date Time Provider Jadon Costa   12/11/2020  1:30 PM Daryle Hood, MD [facility]

## 2020-11-19 NOTE — TELEPHONE ENCOUNTER
Requested Prescriptions     Pending Prescriptions Disp Refills    apixaban (Eliquis) 5 mg tablet 180 Tab 1     Pt called to request a refill.      Future Appointments   Date Time Provider Jadon Costa   12/11/2020  1:30 PM Angel Millan MD [facility] Open[facility]

## 2020-12-01 ENCOUNTER — PATIENT OUTREACH (OUTPATIENT)
Dept: CASE MANAGEMENT | Age: 80
End: 2020-12-01

## 2020-12-11 PROBLEM — D64.9 ANEMIA: Status: ACTIVE | Noted: 2017-11-08

## 2020-12-11 PROBLEM — N30.00 ACUTE CYSTITIS WITHOUT HEMATURIA: Status: ACTIVE | Noted: 2020-10-15

## 2020-12-11 PROBLEM — I82.513 CHRONIC DEEP VEIN THROMBOSIS (DVT) OF FEMORAL VEIN OF BOTH LOWER EXTREMITIES (HCC): Status: ACTIVE | Noted: 2020-12-11

## 2020-12-11 PROBLEM — S72.002A CLOSED FRACTURE OF NECK OF LEFT FEMUR (HCC): Status: ACTIVE | Noted: 2017-10-13

## 2020-12-11 PROBLEM — M97.8XXA PERIPROSTHETIC SUPRACONDYLAR FRACTURE OF FEMUR: Status: ACTIVE | Noted: 2017-10-15

## 2020-12-11 PROBLEM — Z96.659 PERIPROSTHETIC SUPRACONDYLAR FRACTURE OF FEMUR: Status: ACTIVE | Noted: 2017-10-15

## 2020-12-14 ENCOUNTER — TELEPHONE (OUTPATIENT)
Dept: FAMILY MEDICINE CLINIC | Age: 80
End: 2020-12-14

## 2020-12-14 NOTE — TELEPHONE ENCOUNTER
I called and spoke with daughter Ms John Ybarra in regards to BP issues . She told me the care giver did check her BP again on Sunday at 1:30 pm and it was 158/83 . I notified Dr Daniel Vasquez and she would like them to continue monitoring and call if still having high readings later this week daughter voiced understanding .

## 2020-12-14 NOTE — TELEPHONE ENCOUNTER
Oscar Meza with [facility] called with concerns about patients BP says was elevated Saturday and was told by someone at this office to increase lisinopril 20 mg to  bid . Sunday during Pt session bp was 171/ 109 then it was repeated at 12 Pm it was 152/102 .  She no longer has nursing  Just PT/ OT and Syed Gaytan is wanting to know if Dr Raheem Epps would like to place another referral.

## 2020-12-18 ENCOUNTER — TELEPHONE (OUTPATIENT)
Dept: FAMILY MEDICINE CLINIC | Age: 80
End: 2020-12-18

## 2020-12-18 DIAGNOSIS — I10 ESSENTIAL HYPERTENSION: ICD-10-CM

## 2020-12-18 RX ORDER — AMLODIPINE BESYLATE 5 MG/1
5 TABLET ORAL DAILY
Qty: 90 TAB | Refills: 1 | Status: SHIPPED | OUTPATIENT
Start: 2020-12-18

## 2020-12-18 RX ORDER — LISINOPRIL 40 MG/1
TABLET ORAL
Qty: 90 TAB | Refills: 1 | Status: SHIPPED | OUTPATIENT
Start: 2020-12-18

## 2020-12-18 NOTE — TELEPHONE ENCOUNTER
Marge Cummings left a message at nurses station . to notify Dr Dias Heads that patient's blood pressure before taking her medication in the morning  was 170/105 and 185/111 and that patient was  asymptomatic . She was no longer with the patient and said to call the daughter if the provider wanted to make any changes .

## 2020-12-18 NOTE — TELEPHONE ENCOUNTER
I am adding Norvasc 5 mg. She will take this in the morning and take the Lisinopril as 40 mg together in the evening. I have sent in a new Rx for Lisinopril as 40 mg tabs so she will take 1 tab of this in evening. Norvasc went to [facility], Lisinopril to [facility].

## 2023-04-28 NOTE — PROGRESS NOTES
Overactive Bladder    Overactive bladder is a problem with bladder storage function.  Symptoms include one or more of the following symptoms: urinary urgency, urinary frequency (daytime and/or overnight) and/or sometimes urinary incontinence (involuntary loss of urine).  These symptoms occur because the bladder muscles are amber involuntarily, which creates the urgent need to urinate and sometimes leakage of urine.    There are several options for treating an overactive bladder.    Pelvic floor muscle exercises.   Also known as \"Kegels\", pelvic floor muscle exercises strengthen your pelvic floor muscles and urinary sphincter. These strengthened muscles can help you stop the bladder's involuntary contractions. Your provider or a therapist can help you learn how to do Kegel exercises correctly. It will take six to eight weeks before you notice a difference in your symptoms.  Sometimes a device called Intone can be used at home to improve the performance of these exercises.  The device is inserted into the vagina and sessions typically take 15 minutes a day.      Pelvic floor rehabilitation.  Pelvic Floor Rehabilitation (PFR) is specialized occupational or physical therapy that is targeted at improving bladder, bowel and pelvic floor function.  This type of therapy should only be done by a therapist who has been specifically trained.  The typical course of treatment is four or more hour long sessions over several months.  During these sessions, training is completed to improve pelvic floor muscle control and reduce symptoms.  Sometimes gentle hands-on (manual) therapy is added to improve muscle tightness.  Education and home exercises will also be discussed by the therapist.      Maintain a healthy weight.  If you're overweight, losing weight may ease your symptoms.    Fluid consumption.  Avoid excessive fluid intake and avoid drinking beverages that typically irritate the bladder, especially coffee, tea and  PHYSICAL THERAPY - DAILY TREATMENT NOTE Patient Name: Camille Stubbs        Date: 2019 : 1940   YES Patient  Verified Visit #:   1   of   1  Insurance: Payor: [place] MED[place] / Plan: [place] MEDICARE PART A & B / Product Type: Medicare / In time: 12:30P Out time: 1:30P Total Treatment Time: 60  
 
BCBS/Medicare Time Tracking (below) Total Timed Codes (min):  60 1:1 Treatment Time:  60 TREATMENT AREA =  Gait instability [R26.81] Lightheadedness [R42] SUBJECTIVE Pain Level (on 0 to 10 scale):  0  / 10 Medication Changes/New allergies or changes in medical history, any new surgeries or procedures? NO    If yes, update Summary List  
Subjective Functional Status/Changes:  []  No changes reported See POC Modalities Rationale:  NA  
 min [] Estim, type/location:   
                                 []  att     []  unatt     []  w/US     []  w/ice    []  w/heat 
 min []  Mechanical Traction: type/lbs   
               []  pro   []  sup   []  int   []  cont    []  before manual    []  after manual  
 min []  Ultrasound, settings/location:    
 min []  Iontophoresis w/ dexamethasone, location:   
                                           []  take home patch       []  in clinic  
 min []  Ice     []  Heat    location/position:   
 min []  Vasopneumatic Device, press/temp:   
 min []  Other:   
[] Skin assessment post-treatment (if applicable):   
[]  intact    []  redness- no adverse reaction    
[]redness  adverse reaction:     
20 min Self Care: Review of importance of getting balance, maintaining standing prior to walking to allow for blood pressure normalization, review of use of FWW vs. Rollator in the home when patient is feeling more off balance when performing ambulation within the home, Placing R foot further back than L when performing sit to stand, various changes throughout the home to prevent falls, activity pacing. Rationale:    increase ROM, increase strength, improve coordination, improve balance and increase proprioception to improve the patients ability to perform unlimited ADLs Billed With/As: 
 [] TE 
 [] TA 
 [] Neuro 
 [] Self Care Patient Education: [x] Review HEP [] Progressed/Changed HEP based on:  
[] positioning   [] body mechanics   [] transfers   [] heat/ice application   
[] other:   
Other Objective/Functional Measures: 
 
See POC Post Treatment Pain Level (on 0 to 10) scale:   0  / 10 ASSESSMENT Assessment/Changes in Function:  
 
See POC []  See Progress Note/Recertification Patient will continue to benefit from skilled PT services to modify and progress therapeutic interventions, address functional mobility deficits, address ROM deficits, address strength deficits, analyze and address soft tissue restrictions, analyze and cue movement patterns, analyze and modify body mechanics/ergonomics, assess and modify postural abnormalities, address imbalance/dizziness and instruct in home and community integration to attain remaining goals. Progress toward goals / Updated goals: 
See POC PLAN [x]  Upgrade activities as tolerated YES Continue plan of care  
[]  Discharge due to :   
[]  Other:   
 
Therapist: Soren Alexander Date: 5/16/2019 Time: 3:04 PM  
 
Future Appointments Date Time Provider Jadon Costa 9/10/2019 11:00 AM Jessica Beckwith MD [facility]  
 
 
 
 soda.  Other foods/fluids that may irritate the bladder are: citrus fruits and juices, tomato based products, alcoholic beverages, spicy foods, and chocolate.    Double voiding. People who have problems completely emptying their bladders may be helped by double voiding. After urinating, you wait a few minutes and then try again to empty your bladder completely.     Timed urination. Your provider may recommend a schedule for toileting so you urinate at the same times every day -- for example, every two to four hours -- rather than waiting until you feel the urge to urinate. This prevents the bladder from becoming overfilled leading to symptoms of overactive bladder.    Bladder retraining. Occasionally, your doctor may recommend a strategy to train yourself to delay voiding when you feel an urge to urinate. You'll begin with small delays, such as 5 minutes, and gradually work your way up to urinating every three to four hours.     Medications.  Most commonly a medication from the anticholinergic drug class is prescribed.  These medications are typically taken once or twice per day and often control the symptoms of mild to moderate overactive bladder.  The most common side effects of these medications are dry eyes, dry mouth, and constipation.  Please note that only SOME people actually experience these side effects and generally these medications are well tolerated.  There is also a newer medication available now which is similar to the original anticholinergic medications which may be an option for you if you cannot tolerate or cannot take anticholinergics.    Please note that not all insurances cover all of these medications.  In case the medication prescribed by your provider is not available at a cost you feel is affordable, please contact your insurance company and ask if one of these other medications would be more cost effective for you.  The drugs are listed generic (brand name).  -oxybutinin  (Ditropan)  -oxybutinin ER (Ditropan XL)  -(tolterodine) Detrol  -tolterodine LA (Detrol LA)  -trospium (Sanctura)  -trospium ER (Sanctura XR)  -darifenacen (Enablex)  -solifenacin succinate (VESIcare)  -fesoterodine fumarate  (Toviaz)  -oxybutinin gel (Gelnique)  -mirabegron (Myrbetriq)  -vibegron (Gemtesa)  -oxybutinin patches (oxytrol) are available over the counter    Please note that some of the older/generic medications are often cheaper, but also carry higher risk of side effects.    More advanced therapies.   -Percutaneous tibial nerve stimulation (PTNS) is mild bladder (tibial) nerve therapy.  The treatment protocol requires once-a-week treatments for 12 weeks.  Many patients begin to see improvement by the 6th treatment.   A fine needle electrode is inserted near the ankle which is kept in for about 30 minutes to stimulate the nerve.  PTNS is a low-risk procedure. The most common side-effects with PTNS treatment are temporary and minor, resulting from the placement of the needle electrode. They include minor bleeding, mild pain and skin inflammation.  -Botox is medication injected directly into the bladder.  You will be scheduled for an hour long appointment, however the actual procedure takes only about 15 minutes.  Botox lasts for 6-12 months and can be repeated as desired.  The biggest risk of botox is urinary retention, the inability to pass urine.  This is not a permanent side effect and resides as the botox wears off.  -InterStim or sacral nerve neuromodulation works with the sacral nerves, located near the tailbone. The sacral nerves control the bladder and muscles related to urinary function. If the brain and sacral nerves are miscommunicating, the nerves can't tell the bladder to function properly.  InterStim Therapy modulates the sacral nerves with mild electrical pulses. This helps the brain and the nerves to communicate so the bladder and related muscles can function properly.  InterStim  Therapy is a reversible treatment that can be discontinued at any time by turning off or removing the device    You can access patient information sponsored by the American U[employer]ic Association at http://www.urologyhealth.org/overactive-bladder     BOTOX (onabotulimtoxin A) is a prescription medicine that is injected into the bladder muscle during an in-office procedure.  It is used to treat overactive bladder symptoms such as a strong need to urinate with leaking or wetting accidents (urge urinary incontinence), a strong need to urinate right away (urgency), and urinating often (frequency) in adults 18 years and older when another type of medicine (anticholinergic) does not work well enough or cannot be taken.    If you decide to proceed with Botox;  You will be scheduled for an hour-long appointment, however the actual procedure takes only about 15 minutes.  You will lie flat on a table and the doctor will look inside your bladder with a special scope.  Using this scope, the doctor will be able to inject the Botox into different areas of your bladder.  Seven to ten days prior to the procedure we will need you to give a urine sample to your local ACL lab.  For further information on locations, please go to the ACL website. You may be prescribed antibiotics to take before and after the procedure depending on your urine results.    The results of Botox typically last 6 months and injections can be repeated as often as needed.      Due to the risk of urinary retention (not being able to empty the bladder), only patients who are willing and able to initiate catheterization post-treatment, if required, should be considered for treatment.  Catheterization can be either as a catheter you pass yourself several times a day or an indwelling catheter.    In clinical trials, 6.5% of patients (36/552) initiated clean intermittent catheterization for urinary retention following treatment.  This is NOT a permanent side effect.       Other possible side effects of BOTOX® include all of which are rare including:  Dry mouth, discomfort or pain at the injection site, tiredness, headache, neck pain, and eye problems:  Double vision, blurred vision, decreased eyesight, drooping eyelids, swelling of your eyelids, and dry eyes.       InterStim therapy has been FDA-approved since 1997 for urinary symptoms (including urinary urgency, urinary frequency, urinary urge incontinence and urinary retention). InterStim is also known as sacral neuromodulation as it works on the sacral nerves, located near the tailbone.  The sacral nerves control the bladder and muscles related to urinary function.  If the brain and sacral nerves are miscommunicating, the nerves can't tell the bladder to function properly and InterStim Therapy stimulates the sacral nerves with mild electrical pulses.  This helps the brain and the nerves to communicate so the bladder and related muscles can function properly.  InterStim Therapy is a reversible treatment that can be discontinued at any time by turning off or removing the device.    You and your provider may decide to try InterStim Therapy by going through an evaluation.  Before and during the evaluation, you'll be asked to track your symptoms to help determine how well InterStim Therapy works for you.  You may be requested to complete a bladder diary.  With InterStim Therapy's two-step process, you can test it out to see if it will work for you before making a long-term commitment.  The testing period is called a \"basic evaluation\" or a \"trial assessment.\"    The evaluation starts with a minimally invasive outpatient procedure usually done in our office.  For the basic evaluation, which uses a temporary lead, your doctor will numb a small area of your upper buttock and insert a thin wire near your sacral nerves, located near the tailbone. The lead is connected to a small, external neurostimulator that you'll wear on your  waistband like a pager.  The neurostimulator generates mild electrical pulses that are carried to the sacral nerve by the lead.  The evaluation period lasts from 3 to 7 days.  During the evaluation, you will need to use a symptom tracker or bladder diary to write down your urinary symptoms, such as how many times you go to the bathroom and whether you have leaks.  You should be able to work and continue your normal activities, as long as you avoid lifting, bending or twisting movements.  You and your doctor will decide together whether your evaluation was successful.  The evaluation is considered a success if you experience a significant reduction in your symptoms and then you can move forward with permanent implantation of the InterStim device in the operating room at a future date.